# Patient Record
Sex: FEMALE | Race: WHITE | NOT HISPANIC OR LATINO | Employment: FULL TIME | URBAN - METROPOLITAN AREA
[De-identification: names, ages, dates, MRNs, and addresses within clinical notes are randomized per-mention and may not be internally consistent; named-entity substitution may affect disease eponyms.]

---

## 2017-07-06 ENCOUNTER — ALLSCRIPTS OFFICE VISIT (OUTPATIENT)
Dept: OTHER | Facility: OTHER | Age: 37
End: 2017-07-06

## 2017-07-06 DIAGNOSIS — Z12.31 ENCOUNTER FOR SCREENING MAMMOGRAM FOR MALIGNANT NEOPLASM OF BREAST: ICD-10-CM

## 2018-01-11 NOTE — MISCELLANEOUS
Message   Recorded as Task   Date: 12/12/2016 01:14 PM, Created By: Cali Sánchez   Task Name: Med Renewal Request   Assigned To: Ciera Del Rosario   Regarding Patient: Luh Mills TALI, Status: Active   Comment:    Susanna Palomo - 12 Dec 2016 1:14 PM     TASK CREATED  Caller: Self; Renew Medication; [phone] (Home)  Pt would like refill of BC sent to [facility]  Pt is @ [address]  - 12 Dec 2016 1:19 PM     TASK EDITED  sent to pharm,        Active Problems    1  Allergic rhinitis (477 9) (J30 9)   2  Encounter for gynecological examination without abnormal finding (V72 31) (Z01 419)   3  Fibroids (218 9) (D25 9)   4  History of basal cell carcinoma (V10 83) (Z85 828)   5  History of bilateral breast reduction surgery (V45 89) (Z90 13)   6  History of migraine (V12 49) (Z86 69)   7  Need for prophylactic vaccination with combined diphtheria-tetanus-pertussis (DTaP)   vaccine (V06 1) (Z23)   8  Occasional asthma with acute exacerbation (493 92) (J45 901)   9  Reactive airway disease (493 90) (J45 909)    Current Meds   1  Flintstones Plus Iron Oral Tablet Chewable; 2 tabs daily w/ food; Therapy: [id number] to Recorded   2  Lo Loestrin Fe 1 MG-10 MCG / 10 MCG Oral Tablet; TAKE 1 TABLET DAILY; Therapy: 43TSU4930 to (Prieto Yip)  Requested for: 93Cut7824; Last   Rx:[id number] Ordered    Allergies    1  Amoxicillin CAPS   2  Penicillins   3  Sulfa Drugs    4  Other   5   Seasonal    Plan  Encounter for gynecological examination without abnormal finding    · Lo Loestrin Fe 1 MG-10 MCG / 10 MCG Oral Tablet; TAKE 1 TABLET DAILY    Signatures   Electronically signed by : Tod Amaya, ; Dec 12 2016  1:19PM EST                       (Author)

## 2018-01-12 NOTE — CONSULTS
I had the pleasure of evaluating your patient, Evelin Dickey  My full evaluation follows:      History of Present Illness  Elba Elena and is a 41-year-old female who presents today for evaluation of basal cell carcinoma of the left forehead  She was referred to us by Dr Ramiro Pearson' office  This has been present for about a year and a half and is slowly grown in size  It itches on occasion but does not bleed or cause her any pain  Discussion/Summary    Please see HPI  We discussed this with frozen section and talked about options for reconstruction which include complex closure vs local flap rearrangement  We discussed the procedure, how it would be performed as well as the potential risks, benefits, and complications  All questions were answered and consent was obtained  Photographs were taken as well, we will have our surgery scheduler contact her in the near future  The patient was counseled regarding diagnostic results, prognosis  total time of encounter was 30 minutes and 25 minutes was spent counseling  Thank you very much for allowing me to participate in the care of this patient  If you have any questions, please do not hesitate to contact me  Signatures   Electronically signed by : Haley Sam, [facility];  Aug 11 2016  4:09PM EST                       (Author)    Electronically signed by : RADHA Bellamy ; Aug 22 2016 11:01AM EST

## 2018-01-13 VITALS
DIASTOLIC BLOOD PRESSURE: 82 MMHG | HEIGHT: 63 IN | BODY MASS INDEX: 35.44 KG/M2 | SYSTOLIC BLOOD PRESSURE: 120 MMHG | WEIGHT: 200 LBS

## 2018-01-15 NOTE — CONSULTS
Assessment    1  Encounter for gynecological examination without abnormal finding (V72 31) (Q54 510)    Discussion/Summary  Discussion Summary:   Please see HPI  We discussed this with frozen section and talked about options for reconstruction which include complex closure vs local flap rearrangement  We discussed the procedure, how it would be performed as well as the potential risks, benefits, and complications  All questions were answered and consent was obtained  Photographs were taken as well, we will have our surgery scheduler contact her in the near future  Counseling Documentation With Imm: The patient was counseled regarding diagnostic results, prognosis  total time of encounter was 30 minutes and 25 minutes was spent counseling  History of Present Illness  HPI: José Luis Velez and is a 25-year-old female who presents today for evaluation of basal cell carcinoma of the left forehead  She was referred to us by Dr Lyndsey Caldwell' office  This has been present for about a year and a half and is slowly grown in size  It itches on occasion but does not bleed or cause her any pain  Review of Systems  Complete-Female:   Constitutional: no fever and no chills  Eyes: no eyesight problems  ENT: no earache and no hearing loss  Cardiovascular: no chest pain  Respiratory: no shortness of breath  Gastrointestinal: no abdominal pain, no nausea and no diarrhea  Musculoskeletal: no joint swelling and no joint stiffness  Integumentary: skin lesion, but no rashes, no itching and no skin wound  Neurological: no headache and no numbness  Psychiatric: no anxiety and no depression  Hematologic/Lymphatic: no tendency for easy bleeding and no tendency for easy bruising  ROS Reviewed:   ROS reviewed  Active Problems    1  Allergic rhinitis (477 9) (J30 9)   2  Encounter for gynecological examination without abnormal finding (V72 31) (Z71 691)   3  Fibroids (218 9) (D25 9)   4   History of bilateral breast reduction surgery (V45 89) (Z98 89)   5  History of migraine (V12 49) (Z86 69)   6  Need for prophylactic vaccination with combined diphtheria-tetanus-pertussis (DTaP)   vaccine (V06 1) (Z23)   7  Occasional asthma with acute exacerbation (493 92) (J45 901)   8  Reactive airway disease (493 90) (J45 909)    Past Medical History    1  History of Abnormal glucose in pregnancy, antepartum (648 83) (O99 810)   2  History of Elderly primigravida, antepartum (659 53) (O09 519)   3  History of Encounter for postpartum care of lactating mother (V24 1) (Z39 1)   4  History of Exposure to cat feces (V87 39) (T75 89XA)   5  History of pregnancy (V13 29)   6  History of Obesity complicating pregnancy, childbirth, or puerperium, antepartum   (649 13,278 00) (O99 210,E66 9)  Active Problems And Past Medical History Reviewed: The active problems and past medical history were reviewed and updated today  Surgical History    1  History of Arthroscopy Knee   2  History of Breast Surgery Reduction Procedure  Surgical History Reviewed: The surgical history was reviewed and updated today  Family History  Father    1  Family history of myocardial infarction (V17 3) (Z82 49)   2  Family history of Hypertension  Sister    3  Family history of celiac disease (V18 59) (Z83 79)   4  Family history of idiopathic thrombocytopenic purpura (V18 3) (Z83 2)   5  Family history of Lupus   6  Family history of Lupus Vulgaris  Maternal Grandmother    7  Family history of lung cancer (V16 1) (Z80 1)  Paternal Grandmother    6  Family history of Breast cancer  Maternal Grandfather    9  Family history of renal failure (V18 69) (Z84 1)  Family History Reviewed: The family history was reviewed and updated today  Social History    · Always uses seat belt   · Exercise Habits   ·    · Never A Smoker  Social History Reviewed: The social history was reviewed and updated today  The social history was reviewed and is unchanged  Current Meds   1  Flintstones Plus Iron Oral Tablet Chewable; 2 tabs daily w/ food; Therapy: 26LRB7924 to Recorded   2  Lo Loestrin Fe 1 MG-10 MCG / 10 MCG Oral Tablet; TAKE 1 TABLET DAILY; Therapy: 48EEN4962 to (Evaluate:43Sgi4404); Last Rx:98Wej5574 Ordered  Medication List Reviewed: The medication list was reviewed and updated today  Allergies    1  Amoxicillin CAPS   2  Penicillins   3  Sulfa Drugs    4  Other   5  Seasonal    Physical Exam  General Complete Exam (Brief) [facility]:   Constitutional   General appearance: No acute distress, well appearing and well nourished  Eyes   Conjunctiva and lids: No swelling, erythema or discharge  Ears, Nose, Mouth, and Throat   External inspection of ears and nose: Normal     Pulmonary   Respiratory effort: No increased work of breathing or signs of respiratory distress  Cardiovascular   Auscultation of heart: Normal rate and rhythm, normal S1 and S2, without murmurs  Future Appointments    Date/Time Provider Specialty Site   07/06/2017 11:00 AM Geovany Moore, [facility]     Signatures   Electronically signed by : Donna Walker, [facility];  Aug 11 2016  4:09PM EST                       (Author)    Electronically signed by : RADHA Lovelace ; Aug 22 2016 11:01AM EST

## 2018-01-16 NOTE — MISCELLANEOUS
Message   Recorded as Task   Date: 08/29/2016 09:25 AM, Created By: Marylu Melchor   Task Name: Med Renewal Request   Assigned To: Ciera Del Rosario   Regarding Patient: Sierra CESAR, Status: Active   CommentHellen Lank - 29 Aug 2016 9:25 AM     TASK CREATED  Caller: Self; [phone] (Home); [phone] (Work)  pt wanted refills sent to mail order of bc pills        Active Problems    1  Allergic rhinitis (477 9) (J30 9)   2  Encounter for gynecological examination without abnormal finding (V72 31) (Z01 419)   3  Fibroids (218 9) (D25 9)   4  History of bilateral breast reduction surgery (V45 89) (Z98 89)   5  History of migraine (V12 49) (Z86 69)   6  Need for prophylactic vaccination with combined diphtheria-tetanus-pertussis (DTaP)   vaccine (V06 1) (Z23)   7  Occasional asthma with acute exacerbation (493 92) (J45 901)   8  Reactive airway disease (493 90) (J45 909)    Current Meds   1  Flintstones Plus Iron Oral Tablet Chewable; 2 tabs daily w/ food; Therapy: 99RHB8675 to Recorded   2  Lo Loestrin Fe 1 MG-10 MCG / 10 MCG Oral Tablet; TAKE 1 TABLET DAILY; Therapy: 31XCY1384 to (Evaluate:12Btx9194); Last Rx:[id number] Ordered    Allergies    1  Amoxicillin CAPS   2  Penicillins   3  Sulfa Drugs    4  Other   5   Seasonal    Plan  Encounter for gynecological examination without abnormal finding    · Lo Loestrin Fe 1 MG-10 MCG / 10 MCG Oral Tablet; TAKE 1 TABLET DAILY    Signatures   Electronically signed by : Troy Molina, ; Aug 29 2016  9:25AM EST                       (Author)

## 2018-01-16 NOTE — MISCELLANEOUS
Message   Recorded as Task   Date: 02/17/2016 11:57 AM, Created By: Suzie Horner   Task Name: Follow Up   Assigned To: Sweta Varela   Regarding Patient: Gildardo CESAR, Status: Active   Comment:    Suzie Horner - 17 Feb 2016 11:57 AM     TASK CREATED  [facility] viorelle  I will refill it for 3 mos and lm for pt to make apt for mariana   Suzie Horner - 17 Feb 2016 12:00 PM     TASK EDITED        Active Problems    1  Allergic rhinitis (477 9) (J30 9)   2  Encounter for postpartum care of lactating mother (V24 1) (Z39 1)   3  Fibroids (218 9) (D25 9)   4  History of bilateral breast reduction surgery (V45 89) (Z98 89)   5  History of migraine (V12 49) (Z86 69)   6  Need for prophylactic vaccination with combined diphtheria-tetanus-pertussis (DTaP)   vaccine (V06 1) (Z23)   7  Occasional asthma with acute exacerbation (493 92) (J45 901)   8  Reactive airway disease (493 90) (J45 909)    Current Meds   1  Flintstones Plus Iron Oral Tablet Chewable; 2 tabs daily w/ food; Therapy: 63CUK7209 to Recorded   2  Viorele 0 15-0 02/0 01 MG (21/5) Oral Tablet; Take 1 tablet daily as directed; Therapy: 27LFZ1503 to (Amanda Arita)  Requested for: 05HET2300; Last   Rx:22Azx6497 Ordered    Allergies    1  Amoxicillin CAPS   2  Penicillins   3  Sulfa Drugs    4  Other   5  Seasonal    Plan  PMH: Contraceptive use    · From  Viorele 0 15-0 02/0 01 MG (21/5) Oral Tablet Take 1 tablet daily as  directed To Viorele 0 15-0 02/0 01 MG (21/5) Oral Tablet TAKE 1 TABLET EVERY DAY    Signatures   Electronically signed by :  Ann Frias, ; Feb 17 2016 12:01PM EST                       (Author)

## 2018-02-28 ENCOUNTER — OFFICE VISIT (OUTPATIENT)
Dept: FAMILY MEDICINE CLINIC | Facility: CLINIC | Age: 38
End: 2018-02-28
Payer: COMMERCIAL

## 2018-02-28 VITALS
BODY MASS INDEX: 37.21 KG/M2 | DIASTOLIC BLOOD PRESSURE: 92 MMHG | WEIGHT: 202.2 LBS | SYSTOLIC BLOOD PRESSURE: 132 MMHG | HEART RATE: 76 BPM | HEIGHT: 62 IN | RESPIRATION RATE: 16 BRPM

## 2018-02-28 DIAGNOSIS — R20.0 NUMBNESS AND TINGLING IN BOTH HANDS: ICD-10-CM

## 2018-02-28 DIAGNOSIS — R53.82 CHRONIC FATIGUE: Primary | ICD-10-CM

## 2018-02-28 DIAGNOSIS — G89.29 CHRONIC BILATERAL LOW BACK PAIN WITHOUT SCIATICA: ICD-10-CM

## 2018-02-28 DIAGNOSIS — R20.2 NUMBNESS AND TINGLING IN BOTH HANDS: ICD-10-CM

## 2018-02-28 DIAGNOSIS — Z13.220 LIPID SCREENING: ICD-10-CM

## 2018-02-28 DIAGNOSIS — M54.50 CHRONIC BILATERAL LOW BACK PAIN WITHOUT SCIATICA: ICD-10-CM

## 2018-02-28 PROCEDURE — 99204 OFFICE O/P NEW MOD 45 MIN: CPT | Performed by: FAMILY MEDICINE

## 2018-02-28 RX ORDER — CYCLOBENZAPRINE HCL 10 MG
10 TABLET ORAL 3 TIMES DAILY PRN
Qty: 30 TABLET | Refills: 0 | Status: SHIPPED | OUTPATIENT
Start: 2018-02-28 | End: 2018-10-10 | Stop reason: ALTCHOICE

## 2018-02-28 NOTE — PROGRESS NOTES
Quinton Catie was seen today for fatigue  Diagnoses and all orders for this visit:    Chronic fatigue  -     CBC and differential  -     Comprehensive metabolic panel  -     TSH+Free T4  -     Sedimentation rate, automated  -     RF Screen w/ Reflex to Titer  -     CBC and differential  -     Vitamin D 25 hydroxy    Chronic bilateral low back pain without sciatica  -     Urinalysis with microscopic    Numbness and tingling in both hands  -     CBC and differential  -     EMG 2 Limb Upper Extremity    Lipid screening  -     Lipid Panel with Direct LDL reflex          Subjective:      Patient ID: Rodriguez Aguirre is a 42 y o  female  C/o bilateral hand numbness for the last 2 years  The same in intensity  Nothing makes it better or worse  No injury that she could recall  Moving makes it better  Fatigue   This is a chronic problem  The current episode started more than 1 year ago  The problem occurs intermittently  Associated symptoms include abdominal pain, arthralgias, fatigue and myalgias  Pertinent negatives include no anorexia, change in bowel habit, chest pain, chills, congestion, coughing, diaphoresis, fever, headaches, joint swelling, nausea, neck pain, numbness, rash, sore throat, swollen glands, urinary symptoms, vertigo, visual change, vomiting or weakness  Back Pain   This is a new problem  The current episode started 1 to 4 weeks ago  The problem occurs intermittently  The problem has been waxing and waning since onset  Associated symptoms include abdominal pain  Pertinent negatives include no chest pain, fever, headaches, numbness or weakness  Risk factors include history of cancer and obesity  She has tried NSAIDs, ice, heat, home exercises and analgesics for the symptoms  The treatment provided no relief         The following portions of the patient's history were reviewed and updated as appropriate: allergies, current medications, past family history, past medical history, past social history, past surgical history and problem list     Review of Systems   Constitutional: Positive for fatigue  Negative for chills, diaphoresis and fever  HENT: Negative for congestion and sore throat  Respiratory: Negative for cough  Cardiovascular: Negative for chest pain  Gastrointestinal: Positive for abdominal pain  Negative for anorexia, change in bowel habit, nausea and vomiting  Musculoskeletal: Positive for arthralgias, back pain and myalgias  Negative for joint swelling and neck pain  Skin: Negative for rash  Neurological: Negative for vertigo, weakness, numbness and headaches  Past Medical History:   Diagnosis Date    Known health problems: none     Pregnancy     resolved: 11/5/2015     Past Surgical History:   Procedure Laterality Date    ARTHROSCOPY KNEE      BREAST SURGERY      reduction procedure     Social History     Social History    Marital status: Single     Spouse name: N/A    Number of children: N/A    Years of education: N/A     Occupational History    Not on file       Social History Main Topics    Smoking status: Never Smoker    Smokeless tobacco: Never Used    Alcohol use No    Drug use: No    Sexual activity: Yes     Partners: Male     Birth control/ protection: Other     Other Topics Concern    Not on file     Social History Narrative    Always uses seat belt    Exercise habits      Allergies   Allergen Reactions    Amoxicillin Hives    Other      Annotation - [id number]: paprika  blackberries    Penicillins Hives    Sulfa Antibiotics Hives         Family History   Problem Relation Age of Onset    Heart attack Father     Hypertension Father     Celiac disease Sister     Other Sister      idopathic thrombocytopenic purpura    Lupus Sister     Lung cancer Maternal Grandmother     Other Maternal Grandfather      renal failure    Breast cancer Paternal Grandmother          Current Outpatient Prescriptions:     Norethin-Eth Estrad-Fe Biphas (LO LOESTRIN FE) 1 MG-10 MCG / 10 MCG TABS, Take 1 tablet by mouth daily, Disp: , Rfl:       Objective:      /92   Pulse 76   Resp 16   Ht 5' 2" (1 575 m)   Wt 91 7 kg (202 lb 3 2 oz)   BMI 36 98 kg/m²        Physical Exam   Constitutional: She is oriented to person, place, and time  Vital signs are normal  She appears well-developed and well-nourished  HENT:   Head: Normocephalic and atraumatic  Nose: Nose normal    Mouth/Throat: Oropharynx is clear and moist    Eyes: Pupils are equal, round, and reactive to light  Neck: Normal range of motion  Neck supple  No thyromegaly present  Cardiovascular: Normal rate and regular rhythm  No murmur heard  Pulmonary/Chest: Effort normal and breath sounds normal    Abdominal: Soft  Bowel sounds are normal    Musculoskeletal: Normal range of motion  She exhibits tenderness  She exhibits no edema or deformity  Paraspinal lower lumbar spine    Neurological: She is alert and oriented to person, place, and time  She has normal reflexes  Skin: Skin is warm  No rash noted  No erythema  Psychiatric: She has a normal mood and affect   Her behavior is normal

## 2018-02-28 NOTE — PATIENT INSTRUCTIONS

## 2018-03-19 LAB
ALBUMIN SERPL-MCNC: 4.1 G/DL (ref 3.6–5.1)
ALBUMIN/GLOB SERPL: 1.6 (CALC) (ref 1–2.5)
ALP SERPL-CCNC: 53 U/L (ref 33–115)
ALT SERPL-CCNC: 17 U/L (ref 6–29)
AST SERPL-CCNC: 14 U/L (ref 10–30)
BASOPHILS # BLD AUTO: 30 CELLS/UL (ref 0–200)
BASOPHILS NFR BLD AUTO: 0.4 %
BILIRUB SERPL-MCNC: 0.6 MG/DL (ref 0.2–1.2)
BUN SERPL-MCNC: 16 MG/DL (ref 7–25)
BUN/CREAT SERPL: NORMAL (CALC) (ref 6–22)
CALCIUM SERPL-MCNC: 9.1 MG/DL (ref 8.6–10.2)
CHLORIDE SERPL-SCNC: 107 MMOL/L (ref 98–110)
CHOLEST SERPL-MCNC: 204 MG/DL
CHOLEST/HDLC SERPL: 4.4 (CALC)
CO2 SERPL-SCNC: 24 MMOL/L (ref 20–31)
CREAT SERPL-MCNC: 0.79 MG/DL (ref 0.5–1.1)
EOSINOPHIL # BLD AUTO: 81 CELLS/UL (ref 15–500)
EOSINOPHIL NFR BLD AUTO: 1.1 %
ERYTHROCYTE [DISTWIDTH] IN BLOOD BY AUTOMATED COUNT: 12.8 % (ref 11–15)
ERYTHROCYTE [SEDIMENTATION RATE] IN BLOOD BY WESTERGREN METHOD: 2 MM/H
GLOBULIN SER CALC-MCNC: 2.6 G/DL (CALC) (ref 1.9–3.7)
GLUCOSE SERPL-MCNC: 84 MG/DL (ref 65–99)
HCT VFR BLD AUTO: 39.3 % (ref 35–45)
HDLC SERPL-MCNC: 46 MG/DL
HGB BLD-MCNC: 13.3 G/DL (ref 11.7–15.5)
LDLC SERPL CALC-MCNC: 132 MG/DL (CALC)
LYMPHOCYTES # BLD AUTO: 3152 CELLS/UL (ref 850–3900)
LYMPHOCYTES NFR BLD AUTO: 42.6 %
MCH RBC QN AUTO: 29.3 PG (ref 27–33)
MCHC RBC AUTO-ENTMCNC: 33.8 G/DL (ref 32–36)
MCV RBC AUTO: 86.6 FL (ref 80–100)
MONOCYTES # BLD AUTO: 570 CELLS/UL (ref 200–950)
MONOCYTES NFR BLD AUTO: 7.7 %
NEUTROPHILS # BLD AUTO: 3567 CELLS/UL (ref 1500–7800)
NEUTROPHILS NFR BLD AUTO: 48.2 %
NONHDLC SERPL-MCNC: 158 MG/DL (CALC)
PLATELET # BLD AUTO: 368 THOUSAND/UL (ref 140–400)
PMV BLD REES-ECKER: 9.7 FL (ref 7.5–12.5)
POTASSIUM SERPL-SCNC: 4.3 MMOL/L (ref 3.5–5.3)
PROT SERPL-MCNC: 6.7 G/DL (ref 6.1–8.1)
RBC # BLD AUTO: 4.54 MILLION/UL (ref 3.8–5.1)
RHEUMATOID FACT SERPL-ACNC: <14 IU/ML
SL AMB EGFR AFRICAN AMERICAN: 109 ML/MIN/1.73M2
SL AMB EGFR NON AFRICAN AMERICAN: 94 ML/MIN/1.73M2
SODIUM SERPL-SCNC: 140 MMOL/L (ref 135–146)
T4 SERPL-MCNC: 8.3 MCG/DL (ref 4.5–12)
TRIGL SERPL-MCNC: 150 MG/DL
TSH SERPL-ACNC: 1.24 MIU/L
WBC # BLD AUTO: 7.4 THOUSAND/UL (ref 3.8–10.8)

## 2018-04-02 ENCOUNTER — OFFICE VISIT (OUTPATIENT)
Dept: FAMILY MEDICINE CLINIC | Facility: CLINIC | Age: 38
End: 2018-04-02
Payer: COMMERCIAL

## 2018-04-02 VITALS
DIASTOLIC BLOOD PRESSURE: 84 MMHG | BODY MASS INDEX: 34.97 KG/M2 | HEART RATE: 72 BPM | HEIGHT: 64 IN | SYSTOLIC BLOOD PRESSURE: 122 MMHG | WEIGHT: 204.8 LBS | RESPIRATION RATE: 16 BRPM

## 2018-04-02 DIAGNOSIS — Z00.00 WELL ADULT EXAM: ICD-10-CM

## 2018-04-02 DIAGNOSIS — Z12.31 VISIT FOR SCREENING MAMMOGRAM: ICD-10-CM

## 2018-04-02 DIAGNOSIS — J45.21: Primary | ICD-10-CM

## 2018-04-02 PROCEDURE — 99395 PREV VISIT EST AGE 18-39: CPT | Performed by: FAMILY MEDICINE

## 2018-04-02 RX ORDER — FLUTICASONE PROPIONATE 110 UG/1
1 AEROSOL, METERED RESPIRATORY (INHALATION) 2 TIMES DAILY
Qty: 1 INHALER | Refills: 0 | Status: SHIPPED | OUTPATIENT
Start: 2018-04-02 | End: 2018-04-29 | Stop reason: SDUPTHER

## 2018-04-02 RX ORDER — ALBUTEROL SULFATE 90 UG/1
2 AEROSOL, METERED RESPIRATORY (INHALATION) EVERY 6 HOURS PRN
Qty: 1 INHALER | Refills: 0 | Status: SHIPPED | OUTPATIENT
Start: 2018-04-02 | End: 2018-10-10 | Stop reason: ALTCHOICE

## 2018-04-02 NOTE — PROGRESS NOTES
Selene Hitchcock was seen today for physical exam     Diagnoses and all orders for this visit:    Mild intermittent occasional asthma with acute exacerbation  -     albuterol (VENTOLIN HFA) 90 mcg/act inhaler; Inhale 2 puffs every 6 (six) hours as needed for wheezing  -     fluticasone (FLOVENT HFA) 110 MCG/ACT inhaler; Inhale 1 puff 2 (two) times a day    Well adult exam    Visit for screening mammogram  -     Mammo screening bilateral w cad; Future      Patient Counseling:  --Nutrition: Stressed importance of moderation in sodium/caffeine intake, saturated fat and cholesterol, caloric balance, sufficient intake of fresh fruits, vegetables, fiber, calcium, iron, and 1 mg of folate supplement per day   --Discussed  calcium supplement, and the daily use of baby aspirin  --Exercise: Stressed the importance of regular exercise  --Substance Abuse: Discussed cessation/primary prevention of tobacco, alcohol, or other drug use; driving or other dangerous activities under the influence; availability of treatment for abuse  --Sexuality: Discussed sexually transmitted diseases, partner selection, use of condoms, avoidance of unintended pregnancy  and contraceptive alternatives  --Injury prevention: Discussed safety belts, safety helmets, smoke detector, smoking near bedding or upholstery  --Dental health: Discussed importance of regular tooth brushing, flossing, and dental visits  --Immunizations reviewed  --Discussed benefits of screening colonoscopy    Chief Complaint   Patient presents with    Physical Exam     Pt here for Physical       HPI    Patient here for a comprehensive physical exam The patient reports no problems    Do you take any herbs or supplements that were not prescribed by a doctor? no   Are you taking calcium supplements? no   Are you taking aspirin daily? no  How often do you exercise? Cross fit everyday  Diet? High protein   More vegetables and fruits  Dental visit: last year       Vision test: wears glasses and contact lenses  Colonoscopy:  Never had one yet      History:  LMP: 2 weeks ago  Regular  Heavy at times   Last pap date: 2 years ago   Abnormal pap? no  : 1  Para: 1      History   Sexual Activity    Sexual activity: Yes    Partners: Male    Birth control/ protection: Other             Review of Systems   Constitutional: Negative  HENT: Negative  Eyes: Negative  Respiratory: Negative  Cardiovascular: Negative  Gastrointestinal: Negative  Endocrine: Negative  Genitourinary: Negative  Musculoskeletal: Negative  Skin: Negative  Allergic/Immunologic: Negative  Neurological: Negative  Hematological: Negative  Psychiatric/Behavioral: Negative  Family History   Problem Relation Age of Onset    Heart attack Father     Hypertension Father     Celiac disease Sister     Other Sister      idopathic thrombocytopenic purpura    Lupus Sister     Lung cancer Maternal Grandmother     Other Maternal Grandfather      renal failure    Breast cancer Paternal Grandmother        Past Medical History:   Diagnosis Date    Known health problems: none     Pregnancy     resolved: 2015         Social History     Social History    Marital status: Single     Spouse name: N/A    Number of children: N/A    Years of education: N/A     Occupational History    Not on file       Social History Main Topics    Smoking status: Never Smoker    Smokeless tobacco: Never Used    Alcohol use Yes      Comment: social    Drug use: No    Sexual activity: Yes     Partners: Male     Birth control/ protection: Other     Other Topics Concern    Not on file     Social History Narrative    Always uses seat belt    Exercise habits        Current Outpatient Prescriptions on File Prior to Visit   Medication Sig Dispense Refill    cyclobenzaprine (FLEXERIL) 10 mg tablet Take 1 tablet (10 mg total) by mouth 3 (three) times a day as needed for muscle spasms 30 tablet 0  [DISCONTINUED] Norethin-Eth Estrad-Fe Biphas (LO LOESTRIN FE) 1 MG-10 MCG / 10 MCG TABS Take 1 tablet by mouth daily       No current facility-administered medications on file prior to visit  Allergies   Allergen Reactions    Amoxicillin Hives    Other      Annotation - [id number]: paprika  blackberries    Penicillins Hives    Sulfa Antibiotics Hives         Vitals:    04/02/18 1555   BP: 122/84   Pulse: 72   Resp: 16   Weight: 92 9 kg (204 lb 12 8 oz)   Height: 5' 3 7" (1 618 m)         Physical Exam   Constitutional: She is oriented to person, place, and time  Vital signs are normal  She appears well-developed and well-nourished  HENT:   Head: Normocephalic and atraumatic  Nose: Nose normal    Mouth/Throat: Oropharynx is clear and moist    Eyes: Pupils are equal, round, and reactive to light  Neck: Normal range of motion  Neck supple  No thyromegaly present  Cardiovascular: Normal rate and regular rhythm  No murmur heard  Pulmonary/Chest: Effort normal and breath sounds normal    Abdominal: Soft  Bowel sounds are normal    Musculoskeletal: Normal range of motion  She exhibits no edema or deformity  Neurological: She is alert and oriented to person, place, and time  She has normal reflexes  Skin: Skin is warm  No rash noted  No erythema  Psychiatric: She has a normal mood and affect   Her behavior is normal

## 2018-04-29 DIAGNOSIS — J45.21: ICD-10-CM

## 2018-04-30 RX ORDER — DEXAMETHASONE 4 MG/1
1 TABLET ORAL 2 TIMES DAILY
Qty: 12 INHALER | Refills: 0 | Status: SHIPPED | OUTPATIENT
Start: 2018-04-30 | End: 2018-10-10 | Stop reason: ALTCHOICE

## 2018-10-10 ENCOUNTER — OFFICE VISIT (OUTPATIENT)
Dept: OBGYN CLINIC | Facility: CLINIC | Age: 38
End: 2018-10-10
Payer: COMMERCIAL

## 2018-10-10 VITALS — DIASTOLIC BLOOD PRESSURE: 82 MMHG | SYSTOLIC BLOOD PRESSURE: 118 MMHG | BODY MASS INDEX: 32.44 KG/M2 | WEIGHT: 187.2 LBS

## 2018-10-10 DIAGNOSIS — Z01.419 ENCOUNTER FOR GYNECOLOGICAL EXAMINATION WITHOUT ABNORMAL FINDING: Primary | ICD-10-CM

## 2018-10-10 PROCEDURE — S0612 ANNUAL GYNECOLOGICAL EXAMINA: HCPCS | Performed by: PHYSICIAN ASSISTANT

## 2018-10-10 NOTE — PROGRESS NOTES
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 [employer]  She notes a vulvar bump for the past few days that is not painful  Last Pap 3/16/15 neg/neg    No current outpatient prescriptions on file  Social History     Social History    Marital status: Single     Spouse name: N/A    Number of children: N/A    Years of education: N/A     Occupational History    Not on file  Social History Main Topics    Smoking status: Never Smoker    Smokeless tobacco: Never Used    Alcohol use Yes      Comment: social    Drug use: No    Sexual activity: Yes     Partners: Male     Birth control/ protection: Other     Other Topics Concern    Not on file     Social History Narrative    Always uses seat belt    Exercise habits      Family History   Problem Relation Age of Onset    Heart attack Father     Hypertension Father     Celiac disease Sister     Other Sister         idopathic thrombocytopenic purpura    Lupus Sister     Lung cancer Maternal Grandmother     Other Maternal Grandfather         renal failure    Breast cancer Paternal Grandmother       Past Medical History:   Diagnosis Date    Known health problems: none     Pregnancy     resolved: 11/5/2015        O:  Blood pressure 118/82, weight 84 9 kg (187 lb 3 2 oz), last menstrual period 09/19/2018  Patient appears well and is not in distress  Neck is supple without masses  Breasts are symmetrical without mass, tenderness, nipple discharge, skin changes or adenopathy  Abdomen is soft and nontender without masses  External genitals are normal without lesions or rashes  No abnormalities are seen or palpated  Vagina is normal without discharge or bleeding  Cervix is normal without discharge or lesion  Uterus is 12 week sized (known fibroids), mobile, nontender without palpable mass  Adnexa are normal, nontender, without palpable mass  A:  Yearly exam      P:   Pap due 2020   Lo Loestrin sent to mail order   RTO one year for yearly exam or sooner as needed

## 2018-11-12 ENCOUNTER — OFFICE VISIT (OUTPATIENT)
Dept: FAMILY MEDICINE CLINIC | Facility: CLINIC | Age: 38
End: 2018-11-12
Payer: COMMERCIAL

## 2018-11-12 VITALS
SYSTOLIC BLOOD PRESSURE: 106 MMHG | BODY MASS INDEX: 31.76 KG/M2 | RESPIRATION RATE: 16 BRPM | WEIGHT: 186 LBS | HEART RATE: 82 BPM | HEIGHT: 64 IN | DIASTOLIC BLOOD PRESSURE: 74 MMHG | TEMPERATURE: 99.9 F

## 2018-11-12 DIAGNOSIS — J02.9 ACUTE PHARYNGITIS, UNSPECIFIED ETIOLOGY: Primary | ICD-10-CM

## 2018-11-12 PROCEDURE — 1036F TOBACCO NON-USER: CPT | Performed by: FAMILY MEDICINE

## 2018-11-12 PROCEDURE — 3008F BODY MASS INDEX DOCD: CPT | Performed by: FAMILY MEDICINE

## 2018-11-12 PROCEDURE — 99213 OFFICE O/P EST LOW 20 MIN: CPT | Performed by: FAMILY MEDICINE

## 2018-11-12 RX ORDER — PREDNISONE 10 MG/1
20 TABLET ORAL DAILY
Qty: 10 TABLET | Refills: 0 | Status: SHIPPED | OUTPATIENT
Start: 2018-11-12 | End: 2018-11-17

## 2018-11-12 RX ORDER — PROMETHAZINE HYDROCHLORIDE AND CODEINE PHOSPHATE 6.25; 1 MG/5ML; MG/5ML
5 SYRUP ORAL EVERY 4 HOURS PRN
Qty: 120 ML | Refills: 0 | Status: SHIPPED | OUTPATIENT
Start: 2018-11-12 | End: 2019-07-12 | Stop reason: ALTCHOICE

## 2018-11-12 RX ORDER — AZITHROMYCIN 250 MG/1
TABLET, FILM COATED ORAL
COMMUNITY
Start: 2018-11-11 | End: 2019-07-12 | Stop reason: ALTCHOICE

## 2018-11-12 NOTE — PROGRESS NOTES
Assessment/Plan:    No problem-specific Assessment & Plan notes found for this encounter  Diagnoses and all orders for this visit:    Acute pharyngitis, unspecified etiology  -     promethazine-codeine (PHENERGAN WITH CODEINE) 6 25-10 mg/5 mL syrup; Take 5 mL by mouth every 4 (four) hours as needed for cough  -     predniSONE 10 mg tablet; Take 2 tablets (20 mg total) by mouth daily for 5 days    Other orders  -     azithromycin (ZITHROMAX) 250 mg tablet;       continue zpack     Subjective:      Patient ID: Catalina Weston is a 42 y o  female  Sore Throat    This is a new problem  The current episode started yesterday  The problem has been waxing and waning  The pain is worse on the left side  There has been no fever  The fever has been present for less than 1 day  The pain is at a severity of 1/10  The pain is mild  Associated symptoms include coughing  Pertinent negatives include no abdominal pain, congestion, diarrhea, drooling, ear discharge, ear pain, headaches, hoarse voice, plugged ear sensation, neck pain, shortness of breath, stridor, swollen glands, trouble swallowing or vomiting  She has had no exposure to strep or mono  She has tried acetaminophen for the symptoms  The treatment provided mild relief  The following portions of the patient's history were reviewed and updated as appropriate: allergies, current medications, past family history, past medical history, past social history, past surgical history and problem list     Review of Systems   HENT: Positive for sore throat  Negative for congestion, drooling, ear discharge, ear pain, hoarse voice and trouble swallowing  Respiratory: Positive for cough  Negative for shortness of breath and stridor  Gastrointestinal: Negative for abdominal pain, diarrhea and vomiting  Musculoskeletal: Negative for neck pain  Neurological: Negative for headaches           Objective:      /74 (BP Location: Left arm, Patient Position: Sitting, Cuff Size: Standard)   Pulse 82   Temp 99 9 °F (37 7 °C)   Resp 16   Ht 5' 3 7" (1 618 m)   Wt 84 4 kg (186 lb)   BMI 32 23 kg/m²          Physical Exam   Constitutional: She appears well-developed and well-nourished  HENT:   Head: Normocephalic and atraumatic  Mouth/Throat: Posterior oropharyngeal erythema present  No oropharyngeal exudate or tonsillar abscesses  Eyes: Pupils are equal, round, and reactive to light   EOM are normal

## 2019-02-11 ENCOUNTER — AMB VIDEO VISIT (OUTPATIENT)
Dept: URGENT CARE | Facility: MEDICAL CENTER | Age: 39
End: 2019-02-11

## 2019-02-11 DIAGNOSIS — J06.9 ACUTE URI: Primary | ICD-10-CM

## 2019-02-11 PROCEDURE — EVISIT: Performed by: FAMILY MEDICINE

## 2019-02-11 RX ORDER — BENZONATATE 100 MG/1
100 CAPSULE ORAL 3 TIMES DAILY PRN
Qty: 20 CAPSULE | Refills: 0 | Status: SHIPPED | OUTPATIENT
Start: 2019-02-11 | End: 2019-07-12 | Stop reason: ALTCHOICE

## 2019-02-11 RX ORDER — FLUTICASONE PROPIONATE 50 MCG
2 SPRAY, SUSPENSION (ML) NASAL DAILY
Qty: 16 G | Refills: 0 | Status: SHIPPED | OUTPATIENT
Start: 2019-02-11 | End: 2019-07-16 | Stop reason: ALTCHOICE

## 2019-02-11 NOTE — PATIENT INSTRUCTIONS
I prescribed Tessalon Perles 100 mg q 8 hours, fluticasone nasal spray for nasal congestion  She may continue with Mucinex multi symptom medication  However I preferred that she take Tylenol or ibuprofen for fever  If her symptoms worsen, she is to consult with her primary care provider

## 2019-02-11 NOTE — PROGRESS NOTES
[facility]        NAME: Minerva Fam is a 34 y o  female  : 1980    MRN: [id number]  DATE: 2019  TIME: 9:00 AM    Assessment and Plan   Acute URI [J06 9]  1  Acute URI  fluticasone (FLONASE) 50 mcg/act nasal spray    benzonatate (TESSALON PERLES) 100 mg capsule         Patient Instructions       Follow up with PCP in 3-5 days  Proceed to  ER if symptoms worsen  Chief Complaint   No chief complaint on file  History of Present Illness       E visit  Patient complaining of cold symptoms which began today  Symptoms consist of fever 100  She took some Mucinex multi symptom medication with minimal improvement  She is also complaining of nasal congestion, postnasal drip  Cough at times productive  Some shortness of breath but denies wheezing  Known history of asthma and used her Ventolin inhaler this morning  Review of Systems   Review of Systems   Constitutional: Positive for fever  HENT: Positive for congestion and postnasal drip  Respiratory: Positive for cough and shortness of breath  Neurological: Negative            Current Medications       Current Outpatient Medications:     azithromycin (ZITHROMAX) 250 mg tablet, , Disp: , Rfl:     benzonatate (TESSALON PERLES) 100 mg capsule, Take 1 capsule (100 mg total) by mouth 3 (three) times a day as needed for cough, Disp: 20 capsule, Rfl: 0    fluticasone (FLONASE) 50 mcg/act nasal spray, 2 sprays into each nostril daily, Disp: 16 g, Rfl: 0    Norethin-Eth Estrad-Fe Biphas 1 MG-10 MCG / 10 MCG TABS, Take 1 tablet by mouth daily, Disp: 90 tablet, Rfl: 3    promethazine-codeine (PHENERGAN WITH CODEINE) 6 25-10 mg/5 mL syrup, Take 5 mL by mouth every 4 (four) hours as needed for cough, Disp: 120 mL, Rfl: 0    Current Allergies     Allergies as of 2019 - Reviewed 2019   Allergen Reaction Noted    Amoxicillin Hives 2012    Other  2015    Penicillins Hives 2015    Sulfa antibiotics Hives 06/29/2012            The following portions of the patient's history were reviewed and updated as appropriate: allergies, current medications, past family history, past medical history, past social history, past surgical history and problem list      Past Medical History:   Diagnosis Date    Known health problems: none     Pregnancy     resolved: 11/5/2015       Past Surgical History:   Procedure Laterality Date    ARTHROSCOPY KNEE      BREAST SURGERY      reduction procedure       Family History   Problem Relation Age of Onset    Heart attack Father     Hypertension Father     Celiac disease Sister     Other Sister         idopathic thrombocytopenic purpura    Lupus Sister     Lung cancer Maternal Grandmother     Other Maternal Grandfather         renal failure    Breast cancer Paternal Grandmother          Medications have been verified  Objective   There were no vitals taken for this visit         Physical Exam     Physical Exam

## 2019-02-11 NOTE — CARE ANYWHERE EVISITS
Visit Summary for DAXGEORGIE BENJAMIN - Gender: Female - Date of Birth: [id number]  Date: [id number] - Duration: 4 minutes  Patient: Charleen BENJAMIN  Provider: Parrish Ledbetter    Patient Contact Information  Address  [address]; [address] [place] [address]   [phone]    Visit Topics  Fever [Added By: Self - 2019-02-11]  Cold [Added By: Self - 2019-02-11]    Conversation Transcripts     [Notification] You are connected with Family Tristin Physician  [Notification] DAXGEORGIE Rabago is located in [place]  [Notification] DAX Rabago has shared health history         Diagnosis    Procedures  Value: [id number] Code: CPT-4 UNLISTED E&M SERVICE    Medications Prescribed    No prescriptions ordered    Electronically signed by: Angel Luis Partida(NPI [id number])

## 2019-02-15 ENCOUNTER — TELEPHONE (OUTPATIENT)
Dept: FAMILY MEDICINE CLINIC | Facility: CLINIC | Age: 39
End: 2019-02-15

## 2019-02-15 NOTE — TELEPHONE ENCOUNTER
Patient called and stated she has been sick since Monday and she thinks it might have been the flu  and her daughter was just dx with the flu today and she wanted to know if it was to late for to get something for her self sent to the pharmacy   Please advise

## 2019-02-17 NOTE — TELEPHONE ENCOUNTER
I called her and left her voicemail stating if she is feeling better she doesn't need a flu medicine   As long as she didn't have high fever its pretty certain she didn't have flu

## 2019-06-17 ENCOUNTER — OFFICE VISIT (OUTPATIENT)
Dept: OBGYN CLINIC | Facility: CLINIC | Age: 39
End: 2019-06-17
Payer: COMMERCIAL

## 2019-06-17 VITALS — WEIGHT: 191.2 LBS | DIASTOLIC BLOOD PRESSURE: 88 MMHG | BODY MASS INDEX: 33.13 KG/M2 | SYSTOLIC BLOOD PRESSURE: 142 MMHG

## 2019-06-17 DIAGNOSIS — Z30.011 ORAL CONTRACEPTION INITIAL PRESCRIPTION: Primary | ICD-10-CM

## 2019-06-17 DIAGNOSIS — N90.7 LABIAL CYST: ICD-10-CM

## 2019-06-17 DIAGNOSIS — Z01.419 ENCOUNTER FOR GYNECOLOGICAL EXAMINATION WITHOUT ABNORMAL FINDING: ICD-10-CM

## 2019-06-17 PROCEDURE — 99214 OFFICE O/P EST MOD 30 MIN: CPT | Performed by: NURSE PRACTITIONER

## 2019-07-11 ENCOUNTER — AMB VIDEO VISIT (OUTPATIENT)
Dept: OTHER | Facility: HOSPITAL | Age: 39
End: 2019-07-11

## 2019-07-11 DIAGNOSIS — J06.9 ACUTE URI: Primary | ICD-10-CM

## 2019-07-11 PROCEDURE — EVISIT: Performed by: FAMILY MEDICINE

## 2019-07-11 NOTE — PROGRESS NOTES
[facility]        NAME: Venkata Mcgovern is a 34 y o  female  : 1980    MRN: [id number]  DATE: 2019  TIME: 4:49 PM    Assessment and Plan   Acute URI [J06 9]  1  Acute URI       Patient Instructions   1  Acute viral URI (common cold)  - rest and drink plenty of fluids  - take Tylenol or Motrin as needed   - run a humidifier at home  - try warm salt water gargles and throat lozenges as needed   - may take Mucinex as needed for cough   - if symptoms persist despite treatment or worsen, follow up w/ pcp for re-check       Video Visit - Venkata Mcgovern 34 y o  female MRN: [id number]    REQUIRED DOCUMENTATION:       There were no vitals filed for this visit  1  [facility] via [facility]  2  Provider located at [address] [facility]  [phone]   3  [facility] provider: Armaan Mejia MD   4  Identify all parties in room with patient during [facility] visit: patient only  5  After connecting through [employer], patient was identified by name and date of birth  Patient was then informed that this was a Telemedicine visit and that the exam was being conducted confidentially over secure lines  My office door was closed  Other methods to assure confidentiality were taken  No one else was in the room  and The following individuals were in the room with me and the patient informed   Patient acknowledged consent and understanding of privacy and security of the Telemedicine visit  I informed the patient that I have reviewed their record in Epic and presented the opportunity for them to ask any questions regarding the visit today  The patient agreed to participate  Patient Instructions     Patient Instructions   1   Acute viral URI (common cold)  - rest and drink plenty of fluids  - take Tylenol or Motrin as needed   - run a humidifier at home  - try warm salt water gargles and throat lozenges as needed   - may take Mucinex as needed for cough   - if symptoms persist despite treatment or worsen, follow up w/ pcp for re-check     Follow up with PCP in 5-7 days  Proceed to  ER if symptoms worsen  Chief Complaint   No chief complaint on file  History of Present Illness       35 yo female, presents via virtual visit c/o nasal congestion, PND, sore throat, and productive cough x 2 days  No fever/chills  No headache or body aches  No chest pain, SOB, or wheezing  Non-smoker  No GI sx  No skin rashes  She has been taking DayQuil as needed  She denies any known sick contacts  PMHx: none   Rx: OCPs  Allergies: PCN, Sulfa  Pregnancy: no   BF: no   Smoker: no       Review of Systems   Review of Systems   Constitutional: Negative  HENT:        As noted in HPI   Eyes: Negative  Respiratory:        As noted in HPI   Cardiovascular: Negative  Gastrointestinal: Negative  Musculoskeletal: Negative  Skin: Negative            Current Medications       Current Outpatient Medications:     azithromycin (ZITHROMAX) 250 mg tablet, , Disp: , Rfl:     benzonatate (TESSALON PERLES) 100 mg capsule, Take 1 capsule (100 mg total) by mouth 3 (three) times a day as needed for cough (Patient not taking: Reported on 6/17/2019), Disp: 20 capsule, Rfl: 0    fluticasone (FLONASE) 50 mcg/act nasal spray, 2 sprays into each nostril daily (Patient not taking: Reported on 6/17/2019), Disp: 16 g, Rfl: 0    Norethin-Eth Estrad-Fe Biphas 1 MG-10 MCG / 10 MCG TABS, Take 1 tablet by mouth daily, Disp: 90 tablet, Rfl: 0    promethazine-codeine (PHENERGAN WITH CODEINE) 6 25-10 mg/5 mL syrup, Take 5 mL by mouth every 4 (four) hours as needed for cough (Patient not taking: Reported on 6/17/2019), Disp: 120 mL, Rfl: 0    Current Allergies     Allergies as of 07/11/2019 - Reviewed 06/18/2019   Allergen Reaction Noted    Amoxicillin Hives 06/29/2012    Other  03/13/2015    Penicillins Hives 03/13/2015    Sulfa antibiotics Hives 06/29/2012 The following portions of the patient's history were reviewed and updated as appropriate: allergies, current medications, past family history, past medical history, past social history, past surgical history and problem list      Past Medical History:   Diagnosis Date    Known health problems: none     Pregnancy     resolved: 11/5/2015       Past Surgical History:   Procedure Laterality Date    ARTHROSCOPY KNEE      BREAST SURGERY      reduction procedure       Family History   Problem Relation Age of Onset    Heart attack Father     Hypertension Father     Celiac disease Sister     Other Sister         idopathic thrombocytopenic purpura    Lupus Sister     Lung cancer Maternal Grandmother     Other Maternal Grandfather         renal failure    Breast cancer Paternal Grandmother          Medications have been verified  Objective   There were no vitals taken for this visit  Physical Exam     Physical Exam   Constitutional: She is oriented to person, place, and time  She appears well-developed and well-nourished  She is active and cooperative  Non-toxic appearance  She does not have a sickly appearance  She does not appear ill  No distress  HENT:   Exam is limited due to virtual visit  Throat does not appear to be erythematous or swollen  No exudates  Airway is patent  Pulmonary/Chest: Effort normal  No respiratory distress  Neurological: She is alert and oriented to person, place, and time  Skin: She is not diaphoretic  Psychiatric: She has a normal mood and affect   Her behavior is normal  Judgment and thought content normal

## 2019-07-11 NOTE — PATIENT INSTRUCTIONS
1  Acute viral URI (common cold)  - rest and drink plenty of fluids  - take Tylenol or Motrin as needed   - run a humidifier at home  - try warm salt water gargles and throat lozenges as needed   - may take Mucinex as needed for cough   - if symptoms persist despite treatment or worsen, follow up w/ pcp for re-check

## 2019-07-11 NOTE — CARE ANYWHERE EVISITS
Visit Summary for DAXGEORGIE BENJAMIN - Gender: Female - Date of Birth: [id number]  Date: [id number] - Duration: 6 minutes  Patient: Brandon BENJAMIN  Provider: Boaz Messing    Patient Contact Information  Address  [address]; [address] [facility]   [phone]    Visit Topics  Cold [Added By: Self - 2019-07-11]    Conversation Transcripts  [0A][0A] [Notification] You are connected with Boaz Messing, Urgent Care Specialist [0A][Notification] DAX Mascorro is located in [address] [place] [address]  [0A][Notification] DAX Mascorro has shared health history  Oleksandr Kidd  [0A]    Diagnosis    Procedures  Value: [id number] Code: CPT-4 UNLISTED E&M SERVICE    Medications Prescribed    No prescriptions ordered    Electronically signed by: Emiliano Tate(NPI [id number])

## 2019-07-12 ENCOUNTER — OFFICE VISIT (OUTPATIENT)
Dept: FAMILY MEDICINE CLINIC | Facility: CLINIC | Age: 39
End: 2019-07-12
Payer: COMMERCIAL

## 2019-07-12 VITALS
WEIGHT: 199 LBS | BODY MASS INDEX: 33.97 KG/M2 | HEART RATE: 83 BPM | RESPIRATION RATE: 14 BRPM | SYSTOLIC BLOOD PRESSURE: 158 MMHG | TEMPERATURE: 99.2 F | DIASTOLIC BLOOD PRESSURE: 100 MMHG | OXYGEN SATURATION: 98 % | HEIGHT: 64 IN

## 2019-07-12 DIAGNOSIS — Z12.31 VISIT FOR SCREENING MAMMOGRAM: ICD-10-CM

## 2019-07-12 DIAGNOSIS — J03.90 ACUTE TONSILLITIS, UNSPECIFIED ETIOLOGY: Primary | ICD-10-CM

## 2019-07-12 PROCEDURE — 99213 OFFICE O/P EST LOW 20 MIN: CPT | Performed by: FAMILY MEDICINE

## 2019-07-12 PROCEDURE — 3008F BODY MASS INDEX DOCD: CPT | Performed by: FAMILY MEDICINE

## 2019-07-12 RX ORDER — AZITHROMYCIN 250 MG/1
TABLET, FILM COATED ORAL
Qty: 6 TABLET | Refills: 0 | Status: SHIPPED | OUTPATIENT
Start: 2019-07-12 | End: 2019-07-17

## 2019-07-12 NOTE — PROGRESS NOTES
Assessment/Plan:    No problem-specific Assessment & Plan notes found for this encounter  Diagnoses and all orders for this visit:    Acute tonsillitis, unspecified etiology  -     azithromycin (ZITHROMAX) 250 mg tablet; 2 tabs x 1 day, 1 tab x 4 days    Visit for screening mammogram  -     Mammo screening bilateral w cad; Future      BMI Counseling: Body mass index is 33 64 kg/m²  Discussed the patient's BMI with her  The BMI is above average  BMI counseling and education was provided to the patient  Nutrition recommendations include 3-5 servings of fruits/vegetables daily, consuming healthier snacks and moderation in carbohydrate intake  Exercise recommendations include moderate aerobic physical activity for 150 minutes/week  Subjective:      Patient ID: Tiffany Hanson is a 34 y o  female  Sore Throat    This is a new problem  The current episode started in the past 7 days  The problem has been waxing and waning  There has been no fever  The pain is at a severity of 1/10  The pain is mild  Associated symptoms include coughing  Pertinent negatives include no abdominal pain, congestion, diarrhea, drooling, ear discharge, ear pain, headaches, hoarse voice, plugged ear sensation, neck pain, shortness of breath, stridor, swollen glands, trouble swallowing or vomiting  She has had no exposure to strep or mono  The treatment provided mild relief  The following portions of the patient's history were reviewed and updated as appropriate: allergies, current medications, past family history, past medical history, past social history, past surgical history and problem list     Review of Systems   HENT: Positive for sore throat  Negative for congestion, drooling, ear discharge, ear pain, hoarse voice and trouble swallowing  Respiratory: Positive for cough  Negative for shortness of breath and stridor  Gastrointestinal: Negative for abdominal pain, diarrhea and vomiting     Musculoskeletal: Negative for neck pain  Neurological: Negative for headaches  Objective:      /100   Pulse 83   Temp 99 2 °F (37 3 °C)   Resp 14   Ht 5' 4 49" (1 638 m)   Wt 90 3 kg (199 lb)   SpO2 98%   BMI 33 64 kg/m²          Physical Exam   Constitutional: She appears well-developed and well-nourished  HENT:   Mouth/Throat: Posterior oropharyngeal erythema present  No oropharyngeal exudate  Eyes: Pupils are equal, round, and reactive to light  EOM are normal    Cardiovascular: Normal rate and regular rhythm  Exam reveals no friction rub  No murmur heard    Pulmonary/Chest: Effort normal and breath sounds normal

## 2019-07-15 ENCOUNTER — TELEPHONE (OUTPATIENT)
Dept: FAMILY MEDICINE CLINIC | Facility: CLINIC | Age: 39
End: 2019-07-15

## 2019-07-15 DIAGNOSIS — J45.21: Primary | ICD-10-CM

## 2019-07-15 RX ORDER — DEXTROMETHORPHAN HYDROBROMIDE AND PROMETHAZINE HYDROCHLORIDE 15; 6.25 MG/5ML; MG/5ML
5 SOLUTION ORAL 4 TIMES DAILY PRN
Qty: 180 ML | Refills: 0 | Status: SHIPPED | OUTPATIENT
Start: 2019-07-15 | End: 2019-09-03 | Stop reason: ALTCHOICE

## 2019-07-15 NOTE — TELEPHONE ENCOUNTER
Patient called and stated she was in on Friday and stated she didn't want anything for the cough but now she wants to know if you can send something to the pharmacy as the cough hasn't gotten any better        Pharmacy is [facility] on [facility]

## 2019-07-16 ENCOUNTER — ANNUAL EXAM (OUTPATIENT)
Dept: OBGYN CLINIC | Facility: CLINIC | Age: 39
End: 2019-07-16
Payer: COMMERCIAL

## 2019-07-16 VITALS
SYSTOLIC BLOOD PRESSURE: 140 MMHG | HEIGHT: 63 IN | DIASTOLIC BLOOD PRESSURE: 90 MMHG | BODY MASS INDEX: 34.73 KG/M2 | WEIGHT: 196 LBS

## 2019-07-16 DIAGNOSIS — Z12.31 ENCOUNTER FOR SCREENING MAMMOGRAM FOR MALIGNANT NEOPLASM OF BREAST: ICD-10-CM

## 2019-07-16 DIAGNOSIS — N92.0 MENORRHAGIA WITH REGULAR CYCLE: Primary | ICD-10-CM

## 2019-07-16 DIAGNOSIS — Z01.419 ENCNTR FOR GYN EXAM (GENERAL) (ROUTINE) W/O ABN FINDINGS: ICD-10-CM

## 2019-07-16 PROBLEM — Z30.011 ORAL CONTRACEPTION INITIAL PRESCRIPTION: Status: RESOLVED | Noted: 2019-06-17 | Resolved: 2019-07-16

## 2019-07-16 PROCEDURE — 99213 OFFICE O/P EST LOW 20 MIN: CPT | Performed by: PHYSICIAN ASSISTANT

## 2019-07-16 RX ORDER — TRANEXAMIC ACID 650 1/1
2 TABLET ORAL 3 TIMES DAILY
Qty: 30 TABLET | Refills: 3 | Status: SHIPPED | OUTPATIENT
Start: 2019-07-16 | End: 2019-10-30 | Stop reason: ALTCHOICE

## 2019-07-16 NOTE — PROGRESS NOTES
Cookie Spine  1980    S:  34 y o  female here for a problem visit  She reports that she stopped her birth control pills (Lo Loestrin) for about 4 months because she was having headaches  Since stopping her pills, her headaches have resolved, but her periods are again heavy and crampy - which is why she went on it in the first place  She says that now that she is back on Lo Loestrin, she is having headaches again  We also reviewed her elevated blood pressure which she thinks is from the cough medicine she is taking - although her BP was elevated a month ago at her last visit, as well  We discussed my preference to avoid estrogen due to her headaches and blood pressure, and she is in agreement  She says her periods are regular, last 7 days with 3-4 heavy days where she will saturate a regular pad in an hour  We discussed rechecking her TSH and CBC, as well as a pelvic ultrasound (she has a hx of fibroids)  We discussed a trial of Lysteda and she is interested in this  If this fails, then she will consider a Mirena  She says at this point they would prefer to not prevent pregnancy but she also is not very interested in pursuing pregnancy, either, due to her age       Past Medical History:   Diagnosis Date    Known health problems: none     Pregnancy     resolved: 11/5/2015     Family History   Problem Relation Age of Onset    Heart attack Father     Hypertension Father     Celiac disease Sister     Other Sister         idopathic thrombocytopenic purpura    Lupus Sister     Lung cancer Maternal Grandmother     Other Maternal Grandfather         renal failure    Breast cancer Paternal Grandmother      Social History     Socioeconomic History    Marital status: Single     Spouse name: None    Number of children: None    Years of education: None    Highest education level: None   Occupational History    None   Social Needs    Financial resource strain: None    Food insecurity:     Worry: None     Inability: None    Transportation needs:     Medical: None     Non-medical: None   Tobacco Use    Smoking status: Never Smoker    Smokeless tobacco: Never Used   Substance and Sexual Activity    Alcohol use: Yes     Comment: social    Drug use: No    Sexual activity: Yes     Partners: Male     Birth control/protection: OCP   Lifestyle    Physical activity:     Days per week: None     Minutes per session: None    Stress: None   Relationships    Social connections:     Talks on phone: None     Gets together: None     Attends Ho[employer] service: None     Active member of club or organization: None     Attends meetings of clubs or organizations: None     Relationship status: None    Intimate partner violence:     Fear of current or ex partner: None     Emotionally abused: None     Physically abused: None     Forced sexual activity: None   Other Topics Concern    None   Social History Narrative    Always uses seat belt    Exercise habits        Review of Systems   Respiratory: Negative  Cardiovascular: Negative  Gastrointestinal: Negative for constipation and diarrhea  Genitourinary: Negative for difficulty urinating, pelvic pain, vaginal bleeding, vaginal discharge, itching or odor  O:  /90 (BP Location: Right arm, Patient Position: Sitting, Cuff Size: Standard)   Ht 5' 2 99" (1 6 m)   Wt 88 9 kg (196 lb)   BMI 34 73 kg/m²   She appears well and is in no distress  Abdomen is soft and nontender  External genitals are normal without lesions or rashes  Vagina is normal, no discharge or bleeding noted  Cervix is normal, no lesions or discharge  Uterus is 12 week sized, nontender  Adnexa are nontender, no pelvic masses appreciated    A/P: Menorrhagia, regular cycle  Discontinue Lo Loestrin  Check TSH, CBC, pelvic US  Trial Eloisa  Call if she desires Mirena

## 2019-07-17 DIAGNOSIS — J45.21: Primary | ICD-10-CM

## 2019-07-17 RX ORDER — PROMETHAZINE HYDROCHLORIDE AND CODEINE PHOSPHATE 6.25; 1 MG/5ML; MG/5ML
5 SYRUP ORAL EVERY 4 HOURS PRN
Qty: 120 ML | Refills: 0 | Status: SHIPPED | OUTPATIENT
Start: 2019-07-17 | End: 2019-09-03 | Stop reason: ALTCHOICE

## 2019-07-17 NOTE — TELEPHONE ENCOUNTER
Patient called, she is still coughing and not getting better on the Promethazine-DM (PHENERGAN-DM) 6 25-15 mg/5 mL oral syrup     Take 5 mL by mouth 4 (four) times a day as needed for cough    She has not been able to sleep well since starting the medication  Can you prescribe another medication?

## 2019-07-18 ENCOUNTER — HOSPITAL ENCOUNTER (OUTPATIENT)
Dept: MAMMOGRAPHY | Facility: HOSPITAL | Age: 39
Discharge: HOME | End: 2019-07-18
Payer: COMMERCIAL

## 2019-07-18 VITALS — WEIGHT: 196 LBS | BODY MASS INDEX: 34.73 KG/M2 | HEIGHT: 63 IN

## 2019-07-18 DIAGNOSIS — Z12.31 VISIT FOR SCREENING MAMMOGRAM: ICD-10-CM

## 2019-07-18 PROCEDURE — 77067 SCR MAMMO BI INCL CAD: CPT

## 2019-08-15 ENCOUNTER — TELEPHONE (OUTPATIENT)
Dept: OBGYN CLINIC | Facility: CLINIC | Age: 39
End: 2019-08-15

## 2019-08-20 NOTE — TELEPHONE ENCOUNTER
Buy and bill Mirena labeled and placed in [facility] room  Pt can be scheduled for insertion during menses or after abstinence period

## 2019-08-28 NOTE — TELEPHONE ENCOUNTER
DAX CALLED AND HAS AN APPOINTMENT FOR 9/13/19 WITH Nikki Patches  SHE STATES SHE SHOULD HAVE HER MENSES THEN  TOLD HER TO TAKE IBUPROFEN, LIGHT SNACK, AND HYDRATE

## 2019-09-03 ENCOUNTER — OFFICE VISIT (OUTPATIENT)
Dept: OBGYN CLINIC | Facility: CLINIC | Age: 39
End: 2019-09-03
Payer: COMMERCIAL

## 2019-09-03 VITALS — DIASTOLIC BLOOD PRESSURE: 82 MMHG | WEIGHT: 195 LBS | SYSTOLIC BLOOD PRESSURE: 120 MMHG | BODY MASS INDEX: 34.54 KG/M2

## 2019-09-03 DIAGNOSIS — B37.3 YEAST VAGINITIS: Primary | ICD-10-CM

## 2019-09-03 PROCEDURE — 99213 OFFICE O/P EST LOW 20 MIN: CPT | Performed by: PHYSICIAN ASSISTANT

## 2019-09-03 RX ORDER — FLUCONAZOLE 150 MG/1
TABLET ORAL
Qty: 2 TABLET | Refills: 0 | Status: SHIPPED | OUTPATIENT
Start: 2019-09-03 | End: 2019-09-06

## 2019-09-03 NOTE — PROGRESS NOTES
Rekha Shaffer  1980    S:  34 y o  female here for a problem visit  Last week she used a new wet wipe after intercourse, then went to the gym, and the next day had significant vulvar itching and burning  She used Monistat-3 with the last dose last night and is somewhat better       Past Medical History:   Diagnosis Date    Known health problems: none     Pregnancy     resolved: 11/5/2015     Family History   Problem Relation Age of Onset    Heart attack Father     Hypertension Father     Pancreatic cancer Father 61    Celiac disease Sister     Other Sister         idopathic thrombocytopenic purpura    Lupus Sister     Lung cancer Maternal Grandmother     Other Maternal Grandfather         renal failure    Breast cancer Paternal Grandmother 54     Social History     Socioeconomic History    Marital status: /Civil Union     Spouse name: None    Number of children: None    Years of education: None    Highest education level: None   Occupational History    None   Social Needs    Financial resource strain: None    Food insecurity:     Worry: None     Inability: None    Transportation needs:     Medical: None     Non-medical: None   Tobacco Use    Smoking status: Never Smoker    Smokeless tobacco: Never Used   Substance and Sexual Activity    Alcohol use: Yes     Comment: social    Drug use: No    Sexual activity: Yes     Partners: Male     Birth control/protection: OCP   Lifestyle    Physical activity:     Days per week: None     Minutes per session: None    Stress: None   Relationships    Social connections:     Talks on phone: None     Gets together: None     Attends Yazidi service: None     Active member of club or organization: None     Attends meetings of clubs or organizations: None     Relationship status: None    Intimate partner violence:     Fear of current or ex partner: None     Emotionally abused: None     Physically abused: None     Forced sexual activity: None   Other Topics Concern    None   Social History Narrative    Always uses seat belt    Exercise habits        Review of Systems   Respiratory: Negative  Cardiovascular: Negative  Gastrointestinal: Negative for constipation and diarrhea  Genitourinary: Negative for difficulty urinating, pelvic pain, vaginal bleeding, vaginal discharge, itching or odor  O:  /82 (BP Location: Right arm, Patient Position: Sitting, Cuff Size: Standard)   Wt 88 5 kg (195 lb)   LMP 08/14/2019   BMI 34 54 kg/m²   She appears well and is in no distress  Abdomen is soft and nontender  External genitals are normal without lesions or rashes  Vagina shows scant white discharge  Cervix is normal, no lesions or discharge    Wet mount reveals few yeast, no clue cells, no trich, pH 3 5, neg whiff     A/P: Yeast vaginitis  Since she has completed a 3 day course of Monistat and seems to be improving, will wait 1-2 days and if not all better will then use Diflucan x 2 doses  Call in 2 weeks if not all better

## 2019-09-05 ENCOUNTER — TELEPHONE (OUTPATIENT)
Dept: OBGYN CLINIC | Facility: CLINIC | Age: 39
End: 2019-09-05

## 2019-09-05 DIAGNOSIS — N39.0 URINARY TRACT INFECTION WITHOUT HEMATURIA, SITE UNSPECIFIED: Primary | ICD-10-CM

## 2019-09-05 NOTE — TELEPHONE ENCOUNTER
Pt seen on 9/3 with rosalva for [place],  Pt now having strong urine smell & urinating a lot , pls call pt to advise, thanks

## 2019-09-06 ENCOUNTER — APPOINTMENT (OUTPATIENT)
Dept: LAB | Facility: CLINIC | Age: 39
End: 2019-09-06
Payer: COMMERCIAL

## 2019-09-06 DIAGNOSIS — N92.0 MENORRHAGIA WITH REGULAR CYCLE: ICD-10-CM

## 2019-09-06 LAB
BASOPHILS # BLD AUTO: 0.03 THOUSANDS/ΜL (ref 0–0.1)
BASOPHILS NFR BLD AUTO: 0 % (ref 0–1)
EOSINOPHIL # BLD AUTO: 0.07 THOUSAND/ΜL (ref 0–0.61)
EOSINOPHIL NFR BLD AUTO: 1 % (ref 0–6)
ERYTHROCYTE [DISTWIDTH] IN BLOOD BY AUTOMATED COUNT: 12.2 % (ref 11.6–15.1)
HCT VFR BLD AUTO: 41.3 % (ref 34.8–46.1)
HGB BLD-MCNC: 13.8 G/DL (ref 11.5–15.4)
IMM GRANULOCYTES # BLD AUTO: 0.02 THOUSAND/UL (ref 0–0.2)
IMM GRANULOCYTES NFR BLD AUTO: 0 % (ref 0–2)
LYMPHOCYTES # BLD AUTO: 3.74 THOUSANDS/ΜL (ref 0.6–4.47)
LYMPHOCYTES NFR BLD AUTO: 45 % (ref 14–44)
MCH RBC QN AUTO: 29.3 PG (ref 26.8–34.3)
MCHC RBC AUTO-ENTMCNC: 33.4 G/DL (ref 31.4–37.4)
MCV RBC AUTO: 88 FL (ref 82–98)
MONOCYTES # BLD AUTO: 0.6 THOUSAND/ΜL (ref 0.17–1.22)
MONOCYTES NFR BLD AUTO: 7 % (ref 4–12)
NEUTROPHILS # BLD AUTO: 3.9 THOUSANDS/ΜL (ref 1.85–7.62)
NEUTS SEG NFR BLD AUTO: 47 % (ref 43–75)
NRBC BLD AUTO-RTO: 0 /100 WBCS
PLATELET # BLD AUTO: 370 THOUSANDS/UL (ref 149–390)
PMV BLD AUTO: 9.2 FL (ref 8.9–12.7)
RBC # BLD AUTO: 4.71 MILLION/UL (ref 3.81–5.12)
TSH SERPL DL<=0.05 MIU/L-ACNC: 1.29 UIU/ML (ref 0.36–3.74)
WBC # BLD AUTO: 8.36 THOUSAND/UL (ref 4.31–10.16)

## 2019-09-06 PROCEDURE — 36415 COLL VENOUS BLD VENIPUNCTURE: CPT

## 2019-09-06 PROCEDURE — 85025 COMPLETE CBC W/AUTO DIFF WBC: CPT

## 2019-09-06 PROCEDURE — 84443 ASSAY THYROID STIM HORMONE: CPT

## 2019-09-06 RX ORDER — NITROFURANTOIN 25; 75 MG/1; MG/1
100 CAPSULE ORAL 2 TIMES DAILY
Qty: 10 CAPSULE | Refills: 0 | Status: SHIPPED | OUTPATIENT
Start: 2019-09-06 | End: 2019-10-30 | Stop reason: ALTCHOICE

## 2019-09-06 NOTE — TELEPHONE ENCOUNTER
Pt c/o discomfort  At end of urine stream with foul odor to urine   she has been  Forcing fluids   Will go for u/a with c&s today  req rx  Allergy to pen and sulfa    Order placed in epic for macrobid

## 2019-09-13 ENCOUNTER — PROCEDURE VISIT (OUTPATIENT)
Dept: OBGYN CLINIC | Facility: CLINIC | Age: 39
End: 2019-09-13
Payer: COMMERCIAL

## 2019-09-13 VITALS — BODY MASS INDEX: 34.54 KG/M2 | WEIGHT: 195 LBS | SYSTOLIC BLOOD PRESSURE: 108 MMHG | DIASTOLIC BLOOD PRESSURE: 72 MMHG

## 2019-09-13 DIAGNOSIS — Z30.430 ENCOUNTER FOR INSERTION OF MIRENA IUD: Primary | ICD-10-CM

## 2019-09-13 PROCEDURE — 58300 INSERT INTRAUTERINE DEVICE: CPT | Performed by: PHYSICIAN ASSISTANT

## 2019-09-13 NOTE — PROGRESS NOTES
Marge Mendoza Elham  1980      CC:  IUD insertion    S:  34 y o  female here for IUD insertion  She requests Mirena IUD for heavy bleeding  We reviewed the risks of IUD insertion including pain, perforation, migration, irregular bleeding, failure, and infection  She is aware that with Mirena she can expect up to 6 months of irregular bleeding and at that point her periods should become lighter, shorter, and less crampy, and that many women stop having periods with their Mirena  She did premedicate with ibuprofen and a snack prior to insertion  Package insert consent form was signed by the patient and myself prior to the procedure       Past Medical History:   Diagnosis Date    Known health problems: none     Pregnancy     resolved: 11/5/2015     Family History   Problem Relation Age of Onset    Heart attack Father     Hypertension Father     Pancreatic cancer Father 61    Celiac disease Sister     Other Sister         idopathic thrombocytopenic purpura    Lupus Sister     Lung cancer Maternal Grandmother     Other Maternal Grandfather         renal failure    Breast cancer Paternal Grandmother 54     Social History     Socioeconomic History    Marital status: /Civil Union     Spouse name: None    Number of children: None    Years of education: None    Highest education level: None   Occupational History    None   Social Needs    Financial resource strain: None    Food insecurity:     Worry: None     Inability: None    Transportation needs:     Medical: None     Non-medical: None   Tobacco Use    Smoking status: Never Smoker    Smokeless tobacco: Never Used   Substance and Sexual Activity    Alcohol use: Yes     Comment: social    Drug use: No    Sexual activity: Yes     Partners: Male     Birth control/protection: OCP   Lifestyle    Physical activity:     Days per week: None     Minutes per session: None    Stress: None   Relationships    Social connections: Talks on phone: None     Gets together: None     Attends Druze service: None     Active member of club or organization: None     Attends meetings of clubs or organizations: None     Relationship status: None    Intimate partner violence:     Fear of current or ex partner: None     Emotionally abused: None     Physically abused: None     Forced sexual activity: None   Other Topics Concern    None   Social History Narrative    Always uses seat belt    Exercise habits        O:   Blood pressure 108/72, weight 88 5 kg (195 lb), last menstrual period 09/12/2019  Patient appears well and is not in distress  Abdomen is soft and nontender  External genitals are normal without lesion or rash  Vagina is normal    Cervix is normal without lesion  PROCEDURE:   The cervix was cleansed with Betadine  The uterus was sounded to 9 cm  IUD was inserted to the fundus without dilation and with tenaculum  Strings were trimmed to 3cm    The patient tolerated the procedure well without complication  A:  IUD insertion  P: The patient will return in one month for IUD check but knows to call sooner should she have problems prior to that visit

## 2019-10-23 ENCOUNTER — TELEPHONE (OUTPATIENT)
Dept: OTHER | Facility: OTHER | Age: 39
End: 2019-10-23

## 2019-10-24 NOTE — TELEPHONE ENCOUNTER
Patient called to cancel appointment for tomorrow  She is aware she needs to call when the office is open to reschedule

## 2019-10-29 NOTE — PROGRESS NOTES
Alem Howe Elham  1980      CC: IUD check    S: 34 y o  female here for IUD check  She is status post placement of a Mirena IUD on 9/13/19  She has had mild irregular bleeding since placement but nothing bothersome to her  She has had no pain or other side effects  No LMP recorded (lmp unknown)  Patient has had an implant      Current Outpatient Medications:     levonorgestrel (MIRENA) 20 MCG/24HR IUD, 1 each by Intrauterine route once, Disp: , Rfl:   Social History     Socioeconomic History    Marital status: /Civil Union     Spouse name: Not on file    Number of children: Not on file    Years of education: Not on file    Highest education level: Not on file   Occupational History    Not on file   Social Needs    Financial resource strain: Not on file    Food insecurity:     Worry: Not on file     Inability: Not on file    Transportation needs:     Medical: Not on file     Non-medical: Not on file   Tobacco Use    Smoking status: Never Smoker    Smokeless tobacco: Never Used   Substance and Sexual Activity    Alcohol use: Yes     Comment: social    Drug use: No    Sexual activity: Yes     Partners: Male     Birth control/protection: OCP   Lifestyle    Physical activity:     Days per week: Not on file     Minutes per session: Not on file    Stress: Not on file   Relationships    Social connections:     Talks on phone: Not on file     Gets together: Not on file     Attends Episcopalian service: Not on file     Active member of club or organization: Not on file     Attends meetings of clubs or organizations: Not on file     Relationship status: Not on file    Intimate partner violence:     Fear of current or ex partner: Not on file     Emotionally abused: Not on file     Physically abused: Not on file     Forced sexual activity: Not on file   Other Topics Concern    Not on file   Social History Narrative    Always uses seat belt    Exercise habits      Family History   Problem Relation Age of Onset    Heart attack Father     Hypertension Father     Pancreatic cancer Father 61    Celiac disease Sister     Other Sister         idopathic thrombocytopenic purpura    Lupus Sister     Lung cancer Maternal Grandmother     Other Maternal Grandfather         renal failure    Breast cancer Paternal Grandmother 54     Past Medical History:   Diagnosis Date    Known health problems: none     Pregnancy     resolved: 11/5/2015       Review of Systems   Respiratory: Negative  Cardiovascular: Negative  Gastrointestinal: Negative for constipation and diarrhea  Genitourinary: Negative for difficulty urinating, pelvic pain, vaginal bleeding, vaginal discharge, itching or odor  O:  Blood pressure 140/82, weight 90 3 kg (199 lb)  Abdomen is soft and nontender  External genitals are normal without rashes or lesions  Vagina is normal without discharge or bleeding  Cervix is normal without discharge or lesion  IUD strings are normal      A:  IUD in place    P:  Patient was reassured that irregular bleeding is common for the first six months of IUD use and that this should slowly resolve over time  She will call with any persistently abnormal bleeding or other problems  She will return for her yearly exam as scheduled

## 2019-10-30 ENCOUNTER — OFFICE VISIT (OUTPATIENT)
Dept: OBGYN CLINIC | Facility: CLINIC | Age: 39
End: 2019-10-30
Payer: COMMERCIAL

## 2019-10-30 VITALS — DIASTOLIC BLOOD PRESSURE: 82 MMHG | SYSTOLIC BLOOD PRESSURE: 140 MMHG | BODY MASS INDEX: 35.25 KG/M2 | WEIGHT: 199 LBS

## 2019-10-30 DIAGNOSIS — N92.6 IRREGULAR BLEEDING: Primary | ICD-10-CM

## 2019-10-30 PROCEDURE — 99213 OFFICE O/P EST LOW 20 MIN: CPT | Performed by: PHYSICIAN ASSISTANT

## 2019-11-20 ENCOUNTER — AMB VIDEO VISIT (OUTPATIENT)
Dept: URGENT CARE | Facility: CLINIC | Age: 39
End: 2019-11-20

## 2019-11-20 DIAGNOSIS — J06.9 UPPER RESPIRATORY INFECTION, VIRAL: ICD-10-CM

## 2019-11-20 DIAGNOSIS — J45.41 MODERATE PERSISTENT ASTHMA WITH ACUTE EXACERBATION: Primary | ICD-10-CM

## 2019-11-20 PROCEDURE — EVISIT: Performed by: PHYSICIAN ASSISTANT

## 2019-11-20 RX ORDER — PREDNISONE 50 MG/1
50 TABLET ORAL DAILY
Qty: 5 TABLET | Refills: 0 | Status: SHIPPED | OUTPATIENT
Start: 2019-11-20 | End: 2019-11-25

## 2019-11-20 NOTE — CARE ANYWHERE EVISITS
Visit Summary for DAX BENJAMIN - Gender: Female - Date of Birth: [id number]  Date: [id number] - Duration: 4 minutes  Patient: Cathy BENJAMIN  Provider: Omar Lawson PA-C    Patient Contact Information  Address  [address] [facility]; [address] [place] [address]   [phone]    Visit Topics  Cold [Added By: Self - 2019-11-20]    Conversation Transcripts  [0A][0A] [Notification] You are connected with Omar Lawson PA-C, Urgent Care Specialist [0A][Notification] DAX Cristina is located in [place]  [0A][Notification] DAX Cristina has shared health history  Dayashivani Chelita  [0A]    Diagnosis    Procedures  Value: [id number] Code: CPT-4 UNLISTED E&M SERVICE    Medications Prescribed    No prescriptions ordered    Electronically signed by: Zita Trejo(NPI [id number])

## 2019-11-20 NOTE — PROGRESS NOTES
Assessment/Plan   Diagnoses and all orders for this visit:    Moderate persistent asthma with acute exacerbation  -     predniSONE 50 mg tablet; Take 1 tablet (50 mg total) by mouth daily for 5 days    Upper respiratory infection, viral     Prescription sent to the pharmacy for prednisone-use as directed  Recommend using your previously prescribed inhaler as needed for shortness of breath, wheezing or chest tightness  Saline nasal spray several times daily, Flonase a m , cool mist humidifier, Tylenol/ibuprofen as needed for fever or pain  Recommend taking a decongestant such as pseudoephedrine or phenylephrine  Subjective   Patient ID: Crow Aleman is a 34 y o  female  There were no vitals filed for this visit  HPI  The patient is a 78-year-old female who presents via video visit with the complaints of cold symptoms for approximately 3 days  She does have a history of asthma and uses an inhaler as needed  She states that her asthma is generally well controlled  Over the past few days she has developed nasal and sinus congestion  Negative facial pain or pressure  Negative headache  Negative jaw or dental pain  Negative ear pain, drainage or hearing changes  Positive sore throat with postnasal drip  Nonproductive cough with occasional shortness of breath, wheezing and chest tightness  She has not used her inhaler for her symptoms  Negative fever or shaking chills  Negative myalgias  Review of Systems   Constitutional: Positive for fatigue  Negative for activity change, chills and fever  HENT: Positive for congestion, postnasal drip, rhinorrhea, sinus pressure and sore throat  Negative for ear discharge, ear pain, facial swelling, mouth sores, sinus pain, sneezing and trouble swallowing  Eyes: Negative for pain, discharge, redness and itching  Respiratory: Positive for cough, chest tightness, shortness of breath and wheezing  Negative for apnea and stridor      Cardiovascular: Negative for chest pain  Gastrointestinal: Negative for abdominal distention, abdominal pain, diarrhea, nausea and vomiting  Genitourinary: Negative for difficulty urinating  Musculoskeletal: Negative for arthralgias and myalgias  Skin: Negative for pallor and rash  Allergic/Immunologic: Negative  Neurological: Negative for dizziness, light-headedness and headaches  Hematological: Negative  Psychiatric/Behavioral: Negative  All other systems reviewed and are negative  Objective   Physical Exam  The patient is ill appearing and in no acute distress  Voice is hoarse with nasal congestion  The patient denies facial pain with palpation  No audible wheezing

## 2019-11-20 NOTE — PATIENT INSTRUCTIONS
Prescription sent to the pharmacy for prednisone-use as directed  Recommend using your previously prescribed inhaler as needed for shortness of breath, wheezing or chest tightness  Saline nasal spray several times daily, Flonase a m , cool mist humidifier, Tylenol/ibuprofen as needed for fever or pain  Recommend taking a decongestant such as pseudoephedrine or phenylephrine

## 2019-11-20 NOTE — LETTER
November 20, 2019     Patient: Hilary Roberto   YOB: 1980   Date of Visit: 11/20/2019       To Whom it May Concern:    Robert Rocha is under my professional care  She was seen in my office on 11/20/2019  She may return to work 11/21/2019  If you have any questions or concerns, please don't hesitate to call           Sincerely,          Satnam Guevara PA-C        CC: No Recipients

## 2019-11-25 ENCOUNTER — OFFICE VISIT (OUTPATIENT)
Dept: FAMILY MEDICINE CLINIC | Facility: CLINIC | Age: 39
End: 2019-11-25
Payer: COMMERCIAL

## 2019-11-25 VITALS
HEART RATE: 83 BPM | WEIGHT: 205 LBS | BODY MASS INDEX: 37.73 KG/M2 | OXYGEN SATURATION: 98 % | HEIGHT: 62 IN | SYSTOLIC BLOOD PRESSURE: 132 MMHG | RESPIRATION RATE: 18 BRPM | DIASTOLIC BLOOD PRESSURE: 80 MMHG | TEMPERATURE: 98.6 F

## 2019-11-25 DIAGNOSIS — J20.9 ACUTE BRONCHITIS, UNSPECIFIED ORGANISM: Primary | ICD-10-CM

## 2019-11-25 PROCEDURE — 99213 OFFICE O/P EST LOW 20 MIN: CPT | Performed by: FAMILY MEDICINE

## 2019-11-25 PROCEDURE — 1036F TOBACCO NON-USER: CPT | Performed by: FAMILY MEDICINE

## 2019-11-25 RX ORDER — AZITHROMYCIN 250 MG/1
TABLET, FILM COATED ORAL
Qty: 6 TABLET | Refills: 0 | Status: SHIPPED | OUTPATIENT
Start: 2019-11-25 | End: 2019-11-30

## 2019-11-25 RX ORDER — FLUTICASONE FUROATE AND VILANTEROL 100; 25 UG/1; UG/1
1 POWDER RESPIRATORY (INHALATION) DAILY
Qty: 60 EACH | Refills: 0 | Status: SHIPPED | OUTPATIENT
Start: 2019-11-25 | End: 2020-04-03 | Stop reason: ALTCHOICE

## 2019-11-25 NOTE — PROGRESS NOTES
Assessment/Plan:    No problem-specific Assessment & Plan notes found for this encounter  Diagnoses and all orders for this visit:    Acute bronchitis, unspecified organism  -     fluticasone-vilanterol (BREO ELLIPTA) 100-25 mcg/inh inhaler; Inhale 1 puff daily Rinse mouth after use  -     azithromycin (ZITHROMAX) 250 mg tablet; 2 tabs x 1 day, 1 tab x 4 days      humidifier and saline spray     Subjective:      Patient ID: Peace Vail is a 34 y o  female  Has been coughing for 1 week   Took steroids 5 days       Cough   This is a new problem  The current episode started 1 to 4 weeks ago  The problem has been waxing and waning  The problem occurs constantly  The cough is non-productive  Associated symptoms include nasal congestion, postnasal drip, rhinorrhea, shortness of breath and wheezing  Pertinent negatives include no chest pain, chills, ear congestion, ear pain, fever, headaches, heartburn, hemoptysis, myalgias, rash, sore throat, sweats or weight loss  The treatment provided mild relief  Her past medical history is significant for asthma  There is no history of bronchiectasis, bronchitis, COPD, emphysema, environmental allergies or pneumonia  The following portions of the patient's history were reviewed and updated as appropriate: allergies, current medications, past family history, past medical history, past social history, past surgical history and problem list     Review of Systems   Constitutional: Negative for chills, fever and weight loss  HENT: Positive for postnasal drip and rhinorrhea  Negative for ear pain and sore throat  Respiratory: Positive for cough, shortness of breath and wheezing  Negative for hemoptysis  Cardiovascular: Negative for chest pain  Gastrointestinal: Negative for heartburn  Musculoskeletal: Negative for myalgias  Skin: Negative for rash  Allergic/Immunologic: Negative for environmental allergies  Neurological: Negative for headaches  Objective:      /80 (BP Location: Left arm, Patient Position: Sitting, Cuff Size: Standard)   Pulse 83   Temp 98 6 °F (37 °C) (Tympanic)   Resp 18   Ht 5' 2" (1 575 m)   Wt 93 kg (205 lb)   LMP  (LMP Unknown)   SpO2 98%   BMI 37 49 kg/m²          Physical Exam   Constitutional: She is oriented to person, place, and time  She appears well-developed and well-nourished  HENT:   Head: Normocephalic  Mouth/Throat: No oropharyngeal exudate  Eyes: Pupils are equal, round, and reactive to light  EOM are normal    Cardiovascular: Normal rate and regular rhythm  Exam reveals no friction rub  No murmur heard  Pulmonary/Chest: Effort normal and breath sounds normal    Neurological: She is alert and oriented to person, place, and time

## 2019-12-13 ENCOUNTER — APPOINTMENT (EMERGENCY)
Dept: RADIOLOGY | Facility: HOSPITAL | Age: 39
End: 2019-12-13
Payer: COMMERCIAL

## 2019-12-13 ENCOUNTER — HOSPITAL ENCOUNTER (EMERGENCY)
Facility: HOSPITAL | Age: 39
Discharge: HOME | End: 2019-12-13
Attending: EMERGENCY MEDICINE | Admitting: EMERGENCY MEDICINE
Payer: COMMERCIAL

## 2019-12-13 VITALS
TEMPERATURE: 98 F | HEART RATE: 89 BPM | OXYGEN SATURATION: 99 % | BODY MASS INDEX: 37.1 KG/M2 | SYSTOLIC BLOOD PRESSURE: 172 MMHG | RESPIRATION RATE: 18 BRPM | WEIGHT: 202.82 LBS | DIASTOLIC BLOOD PRESSURE: 94 MMHG

## 2019-12-13 DIAGNOSIS — R07.9 CHEST PAIN: Primary | ICD-10-CM

## 2019-12-13 LAB
ALBUMIN SERPL BCP-MCNC: 3.9 G/DL (ref 3.5–5)
ALP SERPL-CCNC: 70 U/L (ref 46–116)
ALT SERPL W P-5'-P-CCNC: 35 U/L (ref 12–78)
ANION GAP SERPL CALCULATED.3IONS-SCNC: 11 MMOL/L (ref 4–13)
AST SERPL W P-5'-P-CCNC: 14 U/L (ref 5–45)
BASOPHILS # BLD AUTO: 0.03 THOUSANDS/ΜL (ref 0–0.1)
BASOPHILS NFR BLD AUTO: 0 % (ref 0–1)
BILIRUB SERPL-MCNC: 0.31 MG/DL (ref 0.2–1)
BUN SERPL-MCNC: 13 MG/DL (ref 5–25)
CALCIUM SERPL-MCNC: 9.1 MG/DL (ref 8.3–10.1)
CHLORIDE SERPL-SCNC: 103 MMOL/L (ref 100–108)
CO2 SERPL-SCNC: 26 MMOL/L (ref 21–32)
CREAT SERPL-MCNC: 0.88 MG/DL (ref 0.6–1.3)
EOSINOPHIL # BLD AUTO: 0.05 THOUSAND/ΜL (ref 0–0.61)
EOSINOPHIL NFR BLD AUTO: 1 % (ref 0–6)
ERYTHROCYTE [DISTWIDTH] IN BLOOD BY AUTOMATED COUNT: 12.5 % (ref 11.6–15.1)
GFR SERPL CREATININE-BSD FRML MDRD: 82 ML/MIN/1.73SQ M
GLUCOSE SERPL-MCNC: 179 MG/DL (ref 65–140)
HCT VFR BLD AUTO: 39.8 % (ref 34.8–46.1)
HGB BLD-MCNC: 13.8 G/DL (ref 11.5–15.4)
IMM GRANULOCYTES # BLD AUTO: 0.01 THOUSAND/UL (ref 0–0.2)
IMM GRANULOCYTES NFR BLD AUTO: 0 % (ref 0–2)
LYMPHOCYTES # BLD AUTO: 3.31 THOUSANDS/ΜL (ref 0.6–4.47)
LYMPHOCYTES NFR BLD AUTO: 45 % (ref 14–44)
MCH RBC QN AUTO: 29.9 PG (ref 26.8–34.3)
MCHC RBC AUTO-ENTMCNC: 34.7 G/DL (ref 31.4–37.4)
MCV RBC AUTO: 86 FL (ref 82–98)
MONOCYTES # BLD AUTO: 0.47 THOUSAND/ΜL (ref 0.17–1.22)
MONOCYTES NFR BLD AUTO: 6 % (ref 4–12)
NEUTROPHILS # BLD AUTO: 3.43 THOUSANDS/ΜL (ref 1.85–7.62)
NEUTS SEG NFR BLD AUTO: 48 % (ref 43–75)
NRBC BLD AUTO-RTO: 0 /100 WBCS
PLATELET # BLD AUTO: 406 THOUSANDS/UL (ref 149–390)
PMV BLD AUTO: 8.7 FL (ref 8.9–12.7)
POTASSIUM SERPL-SCNC: 3.7 MMOL/L (ref 3.5–5.3)
PROT SERPL-MCNC: 7.3 G/DL (ref 6.4–8.2)
RBC # BLD AUTO: 4.62 MILLION/UL (ref 3.81–5.12)
SODIUM SERPL-SCNC: 140 MMOL/L (ref 136–145)
TROPONIN I SERPL-MCNC: <0.02 NG/ML
WBC # BLD AUTO: 7.3 THOUSAND/UL (ref 4.31–10.16)

## 2019-12-13 PROCEDURE — 80053 COMPREHEN METABOLIC PANEL: CPT | Performed by: EMERGENCY MEDICINE

## 2019-12-13 PROCEDURE — 36415 COLL VENOUS BLD VENIPUNCTURE: CPT | Performed by: EMERGENCY MEDICINE

## 2019-12-13 PROCEDURE — 84484 ASSAY OF TROPONIN QUANT: CPT | Performed by: EMERGENCY MEDICINE

## 2019-12-13 PROCEDURE — 93005 ELECTROCARDIOGRAM TRACING: CPT

## 2019-12-13 PROCEDURE — 71046 X-RAY EXAM CHEST 2 VIEWS: CPT

## 2019-12-13 PROCEDURE — 99285 EMERGENCY DEPT VISIT HI MDM: CPT | Performed by: EMERGENCY MEDICINE

## 2019-12-13 PROCEDURE — 99285 EMERGENCY DEPT VISIT HI MDM: CPT

## 2019-12-13 PROCEDURE — 85025 COMPLETE CBC W/AUTO DIFF WBC: CPT | Performed by: EMERGENCY MEDICINE

## 2019-12-13 NOTE — ED PROVIDER NOTES
History  Chief Complaint   Patient presents with    Chest Pain     Pt  reports left sided chest pain that is ongoing x 4 days, pt  mild dizziness     49-year-old female presents with chief complaint of chest pain which she has had for the past 4 days  Pain is left-sided  Patient reports occasional lightheadedness  No shortness of breath, nausea, vomiting or diaphoresis  Patient has no known medical history and takes no medications daily  Patient does not smoke and has no family history of cardiac disease  History provided by:  Patient   used: No    Chest Pain   Pain location:  L chest  Pain quality: aching and sharp    Pain radiates to the back: no    Pain severity:  Mild  Onset quality:  Gradual  Duration:  4 days  Timing:  Intermittent  Progression:  Waxing and waning  Chronicity:  New  Relieved by:  Nothing  Worsened by:  Nothing tried  Ineffective treatments:  None tried  Associated symptoms: dizziness    Associated symptoms: no anxiety, no diaphoresis, no fever, no nausea, no palpitations, no shortness of breath, not vomiting and no weakness    Risk factors: hypertension    Risk factors: no coronary artery disease, no diabetes mellitus, no immobilization, not male, not obese, no smoking and no surgery        Prior to Admission Medications   Prescriptions Last Dose Informant Patient Reported? Taking?   fluticasone-vilanterol (BREO ELLIPTA) 100-25 mcg/inh inhaler 2019 at Unknown time  No Yes   Sig: Inhale 1 puff daily Rinse mouth after use     levonorgestrel (MIRENA) 20 MCG/24HR IUD   Yes No   Si each by Intrauterine route once      Facility-Administered Medications: None       Past Medical History:   Diagnosis Date    Known health problems: none     Pregnancy     resolved: 2015       Past Surgical History:   Procedure Laterality Date    ARTHROSCOPY KNEE      BREAST SURGERY      reduction procedure    REDUCTION MAMMAPLASTY Bilateral        Family History Problem Relation Age of Onset    Heart attack Father     Hypertension Father     Pancreatic cancer Father 61    Celiac disease Sister     Other Sister         idopathic thrombocytopenic purpura    Lupus Sister     Lung cancer Maternal Grandmother     Other Maternal Grandfather         renal failure    Breast cancer Paternal Grandmother 54     I have reviewed and agree with the history as documented  Social History     Tobacco Use    Smoking status: Never Smoker    Smokeless tobacco: Never Used   Substance Use Topics    Alcohol use: Yes     Comment: social    Drug use: No        Review of Systems   Constitutional: Negative for chills, diaphoresis and fever  Respiratory: Negative for shortness of breath  Cardiovascular: Positive for chest pain  Negative for palpitations  Gastrointestinal: Negative for diarrhea, nausea and vomiting  Genitourinary: Negative for dysuria and frequency  Skin: Negative for rash  Neurological: Positive for dizziness and light-headedness  Negative for weakness  All other systems reviewed and are negative  Physical Exam  Physical Exam   Constitutional: She is oriented to person, place, and time  She appears well-developed and well-nourished  No distress  HENT:   Head: Normocephalic and atraumatic  Eyes: Pupils are equal, round, and reactive to light  EOM are normal    Neck: Normal range of motion  No JVD present  Cardiovascular: Normal rate, regular rhythm, normal heart sounds and intact distal pulses  Exam reveals no gallop and no friction rub  No murmur heard  Pulmonary/Chest: Effort normal and breath sounds normal  No respiratory distress  She has no wheezes  She has no rales  She exhibits no tenderness  Musculoskeletal: Normal range of motion  She exhibits no tenderness  Neurological: She is alert and oriented to person, place, and time  Skin: Skin is warm and dry  Psychiatric: She has a normal mood and affect   Her behavior is normal  Judgment and thought content normal    Nursing note and vitals reviewed        Vital Signs  ED Triage Vitals [12/13/19 1447]   Temperature Pulse Respirations Blood Pressure SpO2   98 °F (36 7 °C) 89 18 (!) 172/94 99 %      Temp Source Heart Rate Source Patient Position - Orthostatic VS BP Location FiO2 (%)   Oral Monitor Lying Right arm --      Pain Score       4           Vitals:    12/13/19 1447   BP: (!) 172/94   Pulse: 89   Patient Position - Orthostatic VS: Lying         Visual Acuity      ED Medications  Medications - No data to display    Diagnostic Studies  Results Reviewed     Procedure Component Value Units Date/Time    Comprehensive metabolic panel [[id number]]  (Abnormal) Collected:  12/13/19 1459    Lab Status:  Final result Specimen:  Blood from Arm, Right Updated:  12/13/19 1532     Sodium 140 mmol/L      Potassium 3 7 mmol/L      Chloride 103 mmol/L      CO2 26 mmol/L      ANION GAP 11 mmol/L      BUN 13 mg/dL      Creatinine 0 88 mg/dL      Glucose 179 mg/dL      Calcium 9 1 mg/dL      AST 14 U/L      ALT 35 U/L      Alkaline Phosphatase 70 U/L      Total Protein 7 3 g/dL      Albumin 3 9 g/dL      Total Bilirubin 0 31 mg/dL      eGFR 82 ml/min/1 73sq m     Narrative:       [place] guidelines for Chronic Kidney Disease (CKD):     Stage 1 with normal or high GFR (GFR > 90 mL/min/1 73 square meters)    Stage 2 Mild CKD (GFR = 60-89 mL/min/1 73 square meters)    Stage 3A Moderate CKD (GFR = 45-59 mL/min/1 73 square meters)    Stage 3B Moderate CKD (GFR = 30-44 mL/min/1 73 square meters)    Stage 4 Severe CKD (GFR = 15-29 mL/min/1 73 square meters)    Stage 5 End Stage CKD (GFR <15 mL/min/1 73 square meters)  Note: GFR calculation is accurate only with a steady state creatinine    Troponin I [[id number]]  (Normal) Collected:  12/13/19 1459    Lab Status:  Final result Specimen:  Blood from Arm, Right Updated:  12/13/19 1524     Troponin I <0 02 ng/mL     CBC and differential [[id number]]  (Abnormal) Collected:  12/13/19 1459    Lab Status:  Final result Specimen:  Blood from Arm, Right Updated:  12/13/19 1505     WBC 7 30 Thousand/uL      RBC 4 62 Million/uL      Hemoglobin 13 8 g/dL      Hematocrit 39 8 %      MCV 86 fL      MCH 29 9 pg      MCHC 34 7 g/dL      RDW 12 5 %      MPV 8 7 fL      Platelets 758 Thousands/uL      nRBC 0 /100 WBCs      Neutrophils Relative 48 %      Immat GRANS % 0 %      Lymphocytes Relative 45 %      Monocytes Relative 6 %      Eosinophils Relative 1 %      Basophils Relative 0 %      Neutrophils Absolute 3 43 Thousands/µL      Immature Grans Absolute 0 01 Thousand/uL      Lymphocytes Absolute 3 31 Thousands/µL      Monocytes Absolute 0 47 Thousand/µL      Eosinophils Absolute 0 05 Thousand/µL      Basophils Absolute 0 03 Thousands/µL                  XR chest 2 views   ED Interpretation by Tyron Oglesby MD (12/13 1532)   This film was interpreted independently by me  No infiltrate, cardiac silhouette normal, no pleural effusions or pulmonary edema                    Procedures  ECG 12 Lead Documentation Only  Date/Time: 12/13/2019 3:07 PM  Performed by: Tyron Oglesby MD  Authorized by: Tyron Oglesby MD     Indications / Diagnosis:  Chest pain  Patient location:  ED  Previous ECG:     Previous ECG:  Compared to current    Comparison ECG info:  82    Similarity:  No change  Interpretation:     Interpretation: abnormal    Rate:     ECG rate:  82    ECG rate assessment: normal    Ectopy:     Ectopy: none    QRS:     QRS axis:  Normal  Conduction:     Conduction: abnormal      Abnormal conduction: incomplete RBBB    ST segments:     ST segments:  Normal  T waves:     T waves: normal               ED Course         HEART Risk Score      Most Recent Value   History  0 Filed at: 12/13/2019 1532   ECG  0 Filed at: 12/13/2019 1532   Age  0 Filed at: 12/13/2019 1532   Risk Factors  0 Filed at: 12/13/2019 1532   Troponin  0 Filed at: 12/13/2019 1532 Heart Score Risk Calculator   History  0 Filed at: 12/13/2019 1532   ECG  0 Filed at: 12/13/2019 1532   Age  0 Filed at: 12/13/2019 1532   Risk Factors  0 Filed at: 12/13/2019 1532   Troponin  0 Filed at: 12/13/2019 1532   HEART Score  0 Filed at: 12/13/2019 1532   HEART Score  0 Filed at: 12/13/2019 1532            PERC Rule for PE      Most Recent Value   PERC Rule for PE   Age >=50  0 Filed at: 12/13/2019 1532   HR >=100  0 Filed at: 12/13/2019 1532   O2 Sat on room air < 95%  0 Filed at: 12/13/2019 1532   History of PE or DVT  0 Filed at: 12/13/2019 1532   Recent trauma or surgery  0 Filed at: 12/13/2019 1532   Hemoptysis  0 Filed at: 12/13/2019 1532   Exogenous estrogen  0 Filed at: 12/13/2019 1532   Unilateral leg swelling  0 Filed at: 12/13/2019 1532   PERC Rule for PE Results  0 Filed at: 12/13/2019 1532                Wells' Criteria for PE      Most Recent Value   Wells' Criteria for PE   Clinical signs and symptoms of DVT  0 Filed at: 12/13/2019 1532   PE is primary diagnosis or equally likely  0 Filed at: 12/13/2019 1532   HR >100  0 Filed at: 12/13/2019 1532   Immobilization at least 3 days or Surgery in the previous 4 weeks  0 Filed at: 12/13/2019 1532   Previous, objectively diagnosed PE or DVT  0 Filed at: 12/13/2019 1532   Hemoptysis  0 Filed at: 12/13/2019 1532   Malignancy with treatment within 6 months or palliative  0 Filed at: 12/13/2019 1532   Wells' Criteria Total  0 Filed at: 12/13/2019 1532            MDM  Number of Diagnoses or Management Options  Chest pain: new and requires workup  Diagnosis management comments: Background: 37 y o  female with chest pain    Differential DX includes but is not limited to: acs/mi, pe, pleurisy, dissection, pneumonia, musculoskeletal chest pain    Plan: cardiac workup         Amount and/or Complexity of Data Reviewed  Clinical lab tests: ordered and reviewed  Tests in the radiology section of CPT®: ordered and reviewed    Risk of Complications, Morbidity, and/or Mortality  Presenting problems: high  Diagnostic procedures: high  Management options: high    Patient Progress  Patient progress: stable        Disposition  Final diagnoses:   Chest pain     Time reflects when diagnosis was documented in both MDM as applicable and the Disposition within this note     Time User Action Codes Description Comment    12/13/2019  3:33 PM eCcilia Ha Add [R07 9] Chest pain       ED Disposition     ED Disposition Condition Date/Time Comment    Discharge Stable Fri Dec 13, 2019  3:18 PM [address] discharge to home/self care  Follow-up Information     Follow up With Specialties Details Why Contact Info    Mesha Byrd MD Family Medicine Schedule an appointment as soon as possible for a visit in 1 week  [address]  [address]  [phone]            Patient's Medications   Discharge Prescriptions    No medications on file     No discharge procedures on file      ED Provider  Electronically Signed by           Amber Avila MD  12/13/19 Marga Cloud MD  12/13/19 1231

## 2019-12-15 LAB
ATRIAL RATE: 82 BPM
P AXIS: 47 DEGREES
PR INTERVAL: 146 MS
QRS AXIS: 53 DEGREES
QRSD INTERVAL: 96 MS
QT INTERVAL: 364 MS
QTC INTERVAL: 425 MS
T WAVE AXIS: 10 DEGREES
VENTRICULAR RATE: 82 BPM

## 2019-12-15 PROCEDURE — 93010 ELECTROCARDIOGRAM REPORT: CPT | Performed by: INTERNAL MEDICINE

## 2019-12-16 ENCOUNTER — TELEPHONE (OUTPATIENT)
Dept: FAMILY MEDICINE CLINIC | Facility: CLINIC | Age: 39
End: 2019-12-16

## 2019-12-16 NOTE — TELEPHONE ENCOUNTER
Patient called to schedule ED Follow Up  Because your schedule is so booked, I put her in for 1/30 for 15 minutes and used a same day slot  She is aware that it may need to be changed if you're not okay with this      Please advise

## 2019-12-16 NOTE — TELEPHONE ENCOUNTER
PT SENT A REQUEST THROUGH [facility] FOR AN APPT FOR CHEST PAIN AND BLOOD PRESSURE   I TALKED TO SUMAYA AND SAID TO SEND HER TO ER AND I CALLED PT AND LEFT HER A MESSAGE TO GO TO ER

## 2019-12-18 ENCOUNTER — OFFICE VISIT (OUTPATIENT)
Dept: FAMILY MEDICINE CLINIC | Facility: CLINIC | Age: 39
End: 2019-12-18
Payer: COMMERCIAL

## 2019-12-18 VITALS
BODY MASS INDEX: 37.25 KG/M2 | TEMPERATURE: 99.2 F | DIASTOLIC BLOOD PRESSURE: 90 MMHG | HEART RATE: 78 BPM | SYSTOLIC BLOOD PRESSURE: 120 MMHG | RESPIRATION RATE: 18 BRPM | HEIGHT: 62 IN | WEIGHT: 202.4 LBS | OXYGEN SATURATION: 97 %

## 2019-12-18 DIAGNOSIS — R07.89 MID STERNAL CHEST PAIN: Primary | ICD-10-CM

## 2019-12-18 DIAGNOSIS — R73.03 PRE-DIABETES: ICD-10-CM

## 2019-12-18 DIAGNOSIS — K21.9 GASTROESOPHAGEAL REFLUX DISEASE WITHOUT ESOPHAGITIS: ICD-10-CM

## 2019-12-18 PROCEDURE — 3008F BODY MASS INDEX DOCD: CPT | Performed by: FAMILY MEDICINE

## 2019-12-18 PROCEDURE — 1036F TOBACCO NON-USER: CPT | Performed by: FAMILY MEDICINE

## 2019-12-18 PROCEDURE — 99213 OFFICE O/P EST LOW 20 MIN: CPT | Performed by: FAMILY MEDICINE

## 2019-12-18 RX ORDER — PANTOPRAZOLE SODIUM 40 MG/1
40 TABLET, DELAYED RELEASE ORAL DAILY
Qty: 90 TABLET | Refills: 0 | Status: SHIPPED | OUTPATIENT
Start: 2019-12-18 | End: 2020-04-03 | Stop reason: ALTCHOICE

## 2019-12-18 NOTE — PROGRESS NOTES
Assessment/Plan:    No problem-specific Assessment & Plan notes found for this encounter  Diagnoses and all orders for this visit:    Mid sternal chest pain  -     H  pylori antigen, stool; Future    Gastroesophageal reflux disease without esophagitis  -     Comprehensive metabolic panel  -     Celiac Disease Antibody Profile  -     pantoprazole (PROTONIX) 40 mg tablet; Take 1 tablet (40 mg total) by mouth daily    Pre-diabetes  -     Hemoglobin A1C      to avoid triggers    Subjective:      Patient ID: Chelsi Stapleton is a 37 y o  female  HPI  Chest pain on and off for the last few days   Went to ED 12/13/19 for this   EKG was normal , Troponin was negative  + burning sensation in her chest   + family h/o celiac disease   No nausea or vomiting     The following portions of the patient's history were reviewed and updated as appropriate: allergies, current medications, past family history, past medical history, past social history, past surgical history and problem list     Review of Systems   Constitutional: Negative  HENT: Negative  Respiratory: Negative  Cardiovascular: Negative  Gastrointestinal: Positive for abdominal pain  Endocrine: Negative for cold intolerance and heat intolerance  Genitourinary: Negative for difficulty urinating and dyspareunia  Musculoskeletal: Negative for arthralgias and back pain  Allergic/Immunologic: Negative for environmental allergies and food allergies  Neurological: Negative for dizziness and facial asymmetry  Hematological: Negative for adenopathy  Does not bruise/bleed easily  Psychiatric/Behavioral: Negative for agitation and behavioral problems           Objective:      /90 (BP Location: Right arm, Patient Position: Sitting, Cuff Size: Large)   Pulse 78   Temp 99 2 °F (37 3 °C) (Tympanic)   Resp 18   Ht 5' 2" (1 575 m)   Wt 91 8 kg (202 lb 6 4 oz)   LMP 12/13/2019   SpO2 97%   BMI 37 02 kg/m²          Physical Exam Constitutional: She appears well-developed and well-nourished  Eyes: Pupils are equal, round, and reactive to light  EOM are normal    Cardiovascular: Normal rate and regular rhythm  Pulmonary/Chest: Effort normal and breath sounds normal    Abdominal: There is tenderness  epigastric   Neurological: No cranial nerve deficit  Coordination normal    Skin: No erythema  No pallor

## 2019-12-19 ENCOUNTER — TELEPHONE (OUTPATIENT)
Dept: OBGYN CLINIC | Facility: CLINIC | Age: 39
End: 2019-12-19

## 2019-12-19 NOTE — TELEPHONE ENCOUNTER
Pt thinks she has a yeast infection, her only symptom is "pressure, it just feels different " No burning, discharge, odor

## 2019-12-19 NOTE — TELEPHONE ENCOUNTER
pt was offered an appt for 12/20 she couldn't make that, I advised she goes to urgent care to have her sx assessed, she couldn't give me any real sx other then feeling "off", pt agreed to go

## 2019-12-21 ENCOUNTER — APPOINTMENT (OUTPATIENT)
Dept: LAB | Facility: CLINIC | Age: 39
End: 2019-12-21
Payer: COMMERCIAL

## 2019-12-21 LAB
ALBUMIN SERPL BCP-MCNC: 3.7 G/DL (ref 3.5–5)
ALP SERPL-CCNC: 55 U/L (ref 46–116)
ALT SERPL W P-5'-P-CCNC: 53 U/L (ref 12–78)
ANION GAP SERPL CALCULATED.3IONS-SCNC: 9 MMOL/L (ref 4–13)
AST SERPL W P-5'-P-CCNC: 23 U/L (ref 5–45)
BILIRUB SERPL-MCNC: 0.52 MG/DL (ref 0.2–1)
BUN SERPL-MCNC: 13 MG/DL (ref 5–25)
CALCIUM SERPL-MCNC: 8.7 MG/DL (ref 8.3–10.1)
CHLORIDE SERPL-SCNC: 106 MMOL/L (ref 100–108)
CO2 SERPL-SCNC: 25 MMOL/L (ref 21–32)
CREAT SERPL-MCNC: 0.71 MG/DL (ref 0.6–1.3)
EST. AVERAGE GLUCOSE BLD GHB EST-MCNC: 108 MG/DL
GFR SERPL CREATININE-BSD FRML MDRD: 106 ML/MIN/1.73SQ M
GLUCOSE P FAST SERPL-MCNC: 94 MG/DL (ref 65–99)
HBA1C MFR BLD: 5.4 % (ref 4.2–6.3)
POTASSIUM SERPL-SCNC: 4.1 MMOL/L (ref 3.5–5.3)
PROT SERPL-MCNC: 6.9 G/DL (ref 6.4–8.2)
SODIUM SERPL-SCNC: 140 MMOL/L (ref 136–145)

## 2019-12-21 PROCEDURE — 83516 IMMUNOASSAY NONANTIBODY: CPT | Performed by: FAMILY MEDICINE

## 2019-12-21 PROCEDURE — 3044F HG A1C LEVEL LT 7.0%: CPT | Performed by: FAMILY MEDICINE

## 2019-12-21 PROCEDURE — 80053 COMPREHEN METABOLIC PANEL: CPT | Performed by: FAMILY MEDICINE

## 2019-12-21 PROCEDURE — 82784 ASSAY IGA/IGD/IGG/IGM EACH: CPT | Performed by: FAMILY MEDICINE

## 2019-12-21 PROCEDURE — 86255 FLUORESCENT ANTIBODY SCREEN: CPT | Performed by: FAMILY MEDICINE

## 2019-12-21 PROCEDURE — 36415 COLL VENOUS BLD VENIPUNCTURE: CPT | Performed by: FAMILY MEDICINE

## 2019-12-21 PROCEDURE — 83036 HEMOGLOBIN GLYCOSYLATED A1C: CPT | Performed by: FAMILY MEDICINE

## 2019-12-23 LAB
ENDOMYSIUM IGA SER QL: NEGATIVE
GLIADIN PEPTIDE IGA SER-ACNC: 9 UNITS (ref 0–19)
GLIADIN PEPTIDE IGG SER-ACNC: 2 UNITS (ref 0–19)
IGA SERPL-MCNC: 142 MG/DL (ref 87–352)
TTG IGA SER-ACNC: <2 U/ML (ref 0–3)
TTG IGG SER-ACNC: <2 U/ML (ref 0–5)

## 2019-12-27 ENCOUNTER — TRANSCRIBE ORDERS (OUTPATIENT)
Dept: LAB | Facility: CLINIC | Age: 39
End: 2019-12-27

## 2019-12-27 ENCOUNTER — APPOINTMENT (OUTPATIENT)
Dept: LAB | Facility: CLINIC | Age: 39
End: 2019-12-27
Payer: COMMERCIAL

## 2019-12-27 DIAGNOSIS — R07.89 MID STERNAL CHEST PAIN: ICD-10-CM

## 2019-12-27 PROCEDURE — 87338 HPYLORI STOOL AG IA: CPT

## 2019-12-28 LAB — H PYLORI AG STL QL IA: NEGATIVE

## 2019-12-31 DIAGNOSIS — R10.84 GENERALIZED ABDOMINAL PAIN: Primary | ICD-10-CM

## 2020-01-04 ENCOUNTER — HOSPITAL ENCOUNTER (OUTPATIENT)
Dept: ULTRASOUND IMAGING | Facility: HOSPITAL | Age: 40
Discharge: HOME | End: 2020-01-04
Payer: COMMERCIAL

## 2020-01-04 DIAGNOSIS — R10.84 GENERALIZED ABDOMINAL PAIN: ICD-10-CM

## 2020-01-04 PROCEDURE — 76700 US EXAM ABDOM COMPLETE: CPT

## 2020-03-12 ENCOUNTER — TELEPHONE (OUTPATIENT)
Dept: FAMILY MEDICINE CLINIC | Facility: CLINIC | Age: 40
End: 2020-03-12

## 2020-03-12 DIAGNOSIS — J34.89 INFECTED LESION IN NOSE: Primary | ICD-10-CM

## 2020-03-12 RX ORDER — CLINDAMYCIN PHOSPHATE 10 MG/G
GEL TOPICAL 2 TIMES DAILY
Qty: 30 G | Refills: 0 | Status: SHIPPED | OUTPATIENT
Start: 2020-03-12 | End: 2020-04-03 | Stop reason: ALTCHOICE

## 2020-03-12 NOTE — TELEPHONE ENCOUNTER
Patient called and wanted to know if Dr Marques can squeeze her in today or tomorrow, symptoms feels like a zit in nose and now it hurts and feels swollen   Please advise

## 2020-03-20 ENCOUNTER — TELEPHONE (OUTPATIENT)
Dept: OBGYN CLINIC | Facility: CLINIC | Age: 40
End: 2020-03-20

## 2020-03-20 DIAGNOSIS — R30.0 DYSURIA: Primary | ICD-10-CM

## 2020-03-20 RX ORDER — NITROFURANTOIN 25; 75 MG/1; MG/1
100 CAPSULE ORAL 2 TIMES DAILY
Qty: 14 CAPSULE | Refills: 0 | Status: SHIPPED | OUTPATIENT
Start: 2020-03-20 | End: 2020-03-27

## 2020-03-20 NOTE — TELEPHONE ENCOUNTER
----- [facility] sent at 3/20/2020  8:30 AM EDT -----  Regarding: Non-Urgent Medical Question  Contact: [phone]  I  have a mild UTI  Should I make an appointment or is there something I can do from home?

## 2020-03-20 NOTE — TELEPHONE ENCOUNTER
sheila garcia  [birth year]  [phone]    Pt seen for yrly 10/2019 by rosalva  C/o u/a burning and frequency  req rx  Allergy to sulfa  Order placed in epic for macrobid  Pharm, [facility]  Needs to be signed or changed if not agreeable  Thanks  tt to am

## 2020-04-02 ENCOUNTER — TELEPHONE (OUTPATIENT)
Dept: OBGYN CLINIC | Facility: CLINIC | Age: 40
End: 2020-04-02

## 2020-04-03 ENCOUNTER — TELEPHONE (OUTPATIENT)
Dept: OBGYN CLINIC | Facility: CLINIC | Age: 40
End: 2020-04-03

## 2020-04-03 ENCOUNTER — TELEMEDICINE (OUTPATIENT)
Dept: OBGYN CLINIC | Facility: CLINIC | Age: 40
End: 2020-04-03
Payer: COMMERCIAL

## 2020-04-03 VITALS — HEART RATE: 98 BPM

## 2020-04-03 DIAGNOSIS — N92.6 IRREGULAR BLEEDING: Primary | ICD-10-CM

## 2020-04-03 PROCEDURE — 99214 OFFICE O/P EST MOD 30 MIN: CPT | Performed by: PHYSICIAN ASSISTANT

## 2020-04-23 ENCOUNTER — TELEPHONE (OUTPATIENT)
Dept: GASTROENTEROLOGY | Facility: AMBULARY SURGERY CENTER | Age: 40
End: 2020-04-23

## 2020-04-27 ENCOUNTER — TELEMEDICINE (OUTPATIENT)
Dept: GASTROENTEROLOGY | Facility: AMBULARY SURGERY CENTER | Age: 40
End: 2020-04-27
Payer: COMMERCIAL

## 2020-04-27 DIAGNOSIS — K21.9 GASTROESOPHAGEAL REFLUX DISEASE, ESOPHAGITIS PRESENCE NOT SPECIFIED: Primary | ICD-10-CM

## 2020-04-27 DIAGNOSIS — K76.0 HEPATIC STEATOSIS: ICD-10-CM

## 2020-04-27 DIAGNOSIS — Z80.0 FAMILY HISTORY OF COLON CANCER: ICD-10-CM

## 2020-04-27 PROCEDURE — 99243 OFF/OP CNSLTJ NEW/EST LOW 30: CPT | Performed by: INTERNAL MEDICINE

## 2020-04-28 ENCOUNTER — TELEPHONE (OUTPATIENT)
Dept: GASTROENTEROLOGY | Facility: AMBULARY SURGERY CENTER | Age: 40
End: 2020-04-28

## 2020-07-01 ENCOUNTER — OFFICE VISIT (OUTPATIENT)
Dept: FAMILY MEDICINE CLINIC | Facility: CLINIC | Age: 40
End: 2020-07-01
Payer: COMMERCIAL

## 2020-07-01 VITALS
RESPIRATION RATE: 18 BRPM | WEIGHT: 188.2 LBS | DIASTOLIC BLOOD PRESSURE: 88 MMHG | HEART RATE: 98 BPM | SYSTOLIC BLOOD PRESSURE: 138 MMHG | TEMPERATURE: 97.6 F | HEIGHT: 62 IN | OXYGEN SATURATION: 98 % | BODY MASS INDEX: 34.63 KG/M2

## 2020-07-01 DIAGNOSIS — H81.11 BENIGN PAROXYSMAL POSITIONAL VERTIGO OF RIGHT EAR: Primary | ICD-10-CM

## 2020-07-01 DIAGNOSIS — Z12.31 VISIT FOR SCREENING MAMMOGRAM: ICD-10-CM

## 2020-07-01 PROCEDURE — 1036F TOBACCO NON-USER: CPT | Performed by: FAMILY MEDICINE

## 2020-07-01 PROCEDURE — 99213 OFFICE O/P EST LOW 20 MIN: CPT | Performed by: FAMILY MEDICINE

## 2020-07-01 PROCEDURE — 3008F BODY MASS INDEX DOCD: CPT | Performed by: FAMILY MEDICINE

## 2020-07-01 RX ORDER — MECLIZINE HYDROCHLORIDE 25 MG/1
25 TABLET ORAL EVERY 8 HOURS SCHEDULED
Qty: 30 TABLET | Refills: 0 | Status: SHIPPED | OUTPATIENT
Start: 2020-07-01 | End: 2020-08-18 | Stop reason: ALTCHOICE

## 2020-07-01 NOTE — PROGRESS NOTES
Assessment/Plan:    No problem-specific Assessment & Plan notes found for this encounter  Diagnoses and all orders for this visit:    Benign paroxysmal positional vertigo of right ear  -     meclizine (ANTIVERT) 25 mg tablet; Take 1 tablet (25 mg total) by mouth every 8 (eight) hours    Visit for screening mammogram  -     Mammo screening bilateral w cad; Future          Subjective:      Patient ID: Radha Galan is a 37 y o  female  Started trying coffee with green tea powder for 1 week and started to feel dizzy on and off 5 days Into the regimen   Total of 3 episodes lasting 1 hour each for the last 5 days   Last episode was today and lasted 20 minutes   Feel off balance at times  Some buzzing in her right ear on and off for 1 month     Dizziness   This is a new problem  The current episode started in the past 7 days  The problem has been waxing and waning  Associated symptoms include nausea and vertigo  Pertinent negatives include no abdominal pain, anorexia, arthralgias, change in bowel habit, chest pain, chills, congestion, coughing, diaphoresis, fatigue, fever, headaches, joint swelling, myalgias, neck pain, numbness, rash, sore throat, swollen glands, urinary symptoms, visual change, vomiting or weakness  Exacerbated by: laying down, getting up  She has tried rest for the symptoms  The treatment provided no relief  The following portions of the patient's history were reviewed and updated as appropriate: allergies, current medications, past family history, past medical history, past social history, past surgical history and problem list     Review of Systems   Constitutional: Negative for chills, diaphoresis, fatigue and fever  HENT: Negative for congestion and sore throat  Respiratory: Negative for cough  Cardiovascular: Negative for chest pain  Gastrointestinal: Positive for nausea  Negative for abdominal pain, anorexia, change in bowel habit and vomiting     Musculoskeletal: Negative for arthralgias, joint swelling, myalgias and neck pain  Skin: Negative for rash  Neurological: Positive for dizziness and vertigo  Negative for weakness, numbness and headaches  Objective:      /88 (BP Location: Right arm, Patient Position: Sitting, Cuff Size: Large)   Pulse 98   Temp 97 6 °F (36 4 °C) (Temporal)   Resp 18   Ht 5' 2" (1 575 m)   Wt 85 4 kg (188 lb 3 2 oz)   SpO2 98%   BMI 34 42 kg/m²          Physical Exam   Constitutional: She is oriented to person, place, and time  She appears well-developed and well-nourished  HENT:   Head: Normocephalic and atraumatic  Neck: No tracheal deviation present  No thyromegaly present  Cardiovascular: Normal rate and regular rhythm  No murmur heard  Pulmonary/Chest: Effort normal and breath sounds normal    Musculoskeletal: She exhibits no edema or deformity  Neurological: She is alert and oriented to person, place, and time  No cranial nerve deficit  Coordination normal    Skin: No erythema  No pallor  Psychiatric: She has a normal mood and affect   Her behavior is normal

## 2020-07-10 ENCOUNTER — ANNUAL EXAM (OUTPATIENT)
Dept: OBGYN CLINIC | Facility: CLINIC | Age: 40
End: 2020-07-10
Payer: COMMERCIAL

## 2020-07-10 VITALS
WEIGHT: 188 LBS | BODY MASS INDEX: 33.31 KG/M2 | SYSTOLIC BLOOD PRESSURE: 122 MMHG | HEIGHT: 63 IN | DIASTOLIC BLOOD PRESSURE: 78 MMHG

## 2020-07-10 DIAGNOSIS — Z01.419 ENCNTR FOR GYN EXAM (GENERAL) (ROUTINE) W/O ABN FINDINGS: Primary | ICD-10-CM

## 2020-07-10 DIAGNOSIS — Z12.31 ENCOUNTER FOR SCREENING MAMMOGRAM FOR MALIGNANT NEOPLASM OF BREAST: ICD-10-CM

## 2020-07-10 PROCEDURE — 87624 HPV HI-RISK TYP POOLED RSLT: CPT | Performed by: PHYSICIAN ASSISTANT

## 2020-07-10 PROCEDURE — S0612 ANNUAL GYNECOLOGICAL EXAMINA: HCPCS | Performed by: PHYSICIAN ASSISTANT

## 2020-07-10 PROCEDURE — G0145 SCR C/V CYTO,THINLAYER,RESCR: HCPCS | Performed by: PHYSICIAN ASSISTANT

## 2020-07-10 PROCEDURE — 3008F BODY MASS INDEX DOCD: CPT | Performed by: PHYSICIAN ASSISTANT

## 2020-07-10 PROCEDURE — 58301 REMOVE INTRAUTERINE DEVICE: CPT | Performed by: PHYSICIAN ASSISTANT

## 2020-07-10 NOTE — PROGRESS NOTES
Carmen Roque [facility]  1980      CC:  IUD removal    S:  37 y o  female here for IUD removal   The reason for her IUD removal is irregular bleeding  We reviewed the risks of IUD removal including pain and irregular bleeding  O:   Blood pressure 122/78, height 5' 2 99" (1 6 m), weight 85 3 kg (188 lb), last menstrual period 06/19/2020  Patient appears well and is in no distress  Abdomen is soft and nontender  External genitals are normal without rash or lesion  Vagina is normal without discharge  Cervix is normal without discharge or lesion  IUD strings are normal      PROCEDURE:   IUD strings were grasped with a ring forceps and the IUD was removed by gently pulling on the strings  The IUD was removed in its entirety and was discarded  There were no complications and the patient tolerated the procedure well  A:   IUD removal      P:  Return for yearly exam or sooner as needed

## 2020-07-10 NOTE — PROGRESS NOTES
Mahnaz English [place]  1980      CC:  Yearly exam    S:  37 y o  female here for yearly exam She has a Mirena since 9/2019  She has two week long bleeds of spotting every month  She gets a migraine on day 1 of her menses  She feels these are due to her Mirena  However, in the past, she says she has had two week long bleeds on any birth control she has been on in the past    She has had headaches on Lo Loestrin in the past  She also had elevated blood pressures on Lo Loestrin  She tried Lysteda in the past which did help  She will return to using this PRN  Off of birth control, she has very heavy periods with a 7 day bleed where she has 3-4 days where she will saturate a regular pad in an hour  She has not gone for her ultrasound that I ordered back in April to follow up on her fibroids  Sexual activity: She is sexually active without pain, bleeding or dryness  Contraception: She uses Mirena for contraception  Her  is scheduled for a vasectomy  She is aware to use condoms until he is cleared by urology  Last Pap 3/16/15 neg/neg  Last Mammo 7/19/19 neg    We reviewed ASCCP guidelines for Pap testing today       Family hx of breast cancer: PGM  Family hx of ovarian cancer: no  Family hx of colon cancer: no    Current Outpatient Medications:     levonorgestrel (MIRENA) 20 MCG/24HR IUD, 1 each by Intrauterine route once, Disp: , Rfl:     meclizine (ANTIVERT) 25 mg tablet, Take 1 tablet (25 mg total) by mouth every 8 (eight) hours, Disp: 30 tablet, Rfl: 0  Social History     Socioeconomic History    Marital status: /Civil Union     Spouse name: Not on file    Number of children: Not on file    Years of education: Not on file    Highest education level: Not on file   Occupational History    Not on file   Social Needs    Financial resource strain: Not on file    Food insecurity:     Worry: Not on file     Inability: Not on file    Transportation needs:     Medical: Not on file Non-medical: Not on file   Tobacco Use    Smoking status: Never Smoker    Smokeless tobacco: Never Used   Substance and Sexual Activity    Alcohol use: Yes     Frequency: Never     Drinks per session: 1 or 2     Binge frequency: Never     Comment: social    Drug use: No    Sexual activity: Yes     Partners: Male     Birth control/protection: OCP   Lifestyle    Physical activity:     Days per week: Not on file     Minutes per session: Not on file    Stress: Not on file   Relationships    Social connections:     Talks on phone: Not on file     Gets together: Not on file     Attends Gnosticist service: Not on file     Active member of club or organization: Not on file     Attends meetings of clubs or organizations: Not on file     Relationship status: Not on file    Intimate partner violence:     Fear of current or ex partner: Not on file     Emotionally abused: Not on file     Physically abused: Not on file     Forced sexual activity: Not on file   Other Topics Concern    Not on file   Social History Narrative    Always uses seat belt    Exercise habits      Family History   Problem Relation Age of Onset    Heart attack Father     Hypertension Father     Pancreatic cancer Father 61    Celiac disease Sister     Other Sister         idopathic thrombocytopenic purpura    Lupus Sister     Lung cancer Maternal Grandmother     Other Maternal Grandfather         renal failure    Breast cancer Paternal Grandmother 54      Past Medical History:   Diagnosis Date    Known health problems: none     Pregnancy     resolved: 11/5/2015        Review of Systems   Respiratory: Negative  Cardiovascular: Negative  Gastrointestinal: Negative for constipation and diarrhea  Genitourinary: Negative for difficulty urinating, pelvic pain, vaginal bleeding, vaginal discharge, itching or odor      O:  Blood pressure 122/78, height 5' 2 99" (1 6 m), weight 85 3 kg (188 lb), last menstrual period 06/19/2020  Patient appears well and is not in distress  Neck is supple without masses  Breasts are symmetrical without mass, tenderness, nipple discharge, skin changes or adenopathy  Abdomen is soft and nontender without masses  External genitals are normal without lesions or rashes  Urethral meatus and urethra are normal  Bladder is normal to palpation  Vagina is normal without discharge or bleeding  Cervix is normal without discharge or lesion  Uterus is normal, mobile, nontender without palpable mass  Adnexa are normal, nontender, without palpable mass  A:   Yearly exam     Irregular menses   Fibroids    P:   Pap and HPV today     She has a mammo slip from her PCP   Check pelvic US   Lysteda PRN for heavy bleeding, call if ineffective    RTO one year for yearly exam or sooner as needed

## 2020-07-13 ENCOUNTER — TELEPHONE (OUTPATIENT)
Dept: OBGYN CLINIC | Facility: CLINIC | Age: 40
End: 2020-07-13

## 2020-07-13 NOTE — TELEPHONE ENCOUNTER
Pt had IUD removed 07/10/2020 by provider [facility] She has a Mirena since 9/2019  She has two week long bleeds of spotting every month  She gets a migraine on day 1 of her menses  She feels these are due to her Mirena  However, in the past, she says she has had two week long bleeds on any birth control she has been on in the past     She has had headaches on Lo Loestrin in the past  She also had elevated blood pressures on Lo Loestrin       She tried Lysteda in the past which did help  She will return to using this PRN     Off of birth control, she has very heavy periods with a 7 day bleed where she has 3-4 days where she will saturate a regular pad in an hour       She has not gone for her ultrasound that I ordered back in April to follow up on her fibroids  I returned pt call she stated IUD removed 07/10/2020 saturating a regular tampon every two hours with quarter sized clots  Pt asked if she has been taking lysteda PRN as advised last visit  Pt stated she has not but will start taking PRN  I advised pt on ultrasound ordered back in April  Pt stated she was going to call today and schedule due to she has to schedule a mammogram as well  I expressed importance of having it done as it will give a better insight as to what is going on with her  Pt agreed to plan of action and to call bk should she have any other questions or concerns  I advised pt I will forward information to the provider as an FYI update and if she has any other recommendations we will give her a call bk when provider returns  Pt agreed

## 2020-07-13 NOTE — TELEPHONE ENCOUNTER
----- Message from [facility] sent at 7/13/2020 10:29 AM EDT -----  Regarding: Visit Follow-Up Question  Contact: [phone]  Marj Han,    Is it normal bleed after you get the iud removed?      Socorro Ortega

## 2020-07-15 LAB
HPV HR 12 DNA CVX QL NAA+PROBE: NEGATIVE
HPV16 DNA CVX QL NAA+PROBE: NEGATIVE
HPV18 DNA CVX QL NAA+PROBE: NEGATIVE

## 2020-07-17 LAB
LAB AP GYN PRIMARY INTERPRETATION: NORMAL
Lab: NORMAL

## 2020-07-21 ENCOUNTER — HOSPITAL ENCOUNTER (OUTPATIENT)
Dept: ULTRASOUND IMAGING | Facility: HOSPITAL | Age: 40
Discharge: HOME | End: 2020-07-21
Payer: COMMERCIAL

## 2020-07-21 DIAGNOSIS — N92.0 MENORRHAGIA WITH REGULAR CYCLE: ICD-10-CM

## 2020-07-21 PROCEDURE — 76856 US EXAM PELVIC COMPLETE: CPT

## 2020-07-21 PROCEDURE — 76830 TRANSVAGINAL US NON-OB: CPT

## 2020-07-23 ENCOUNTER — TELEPHONE (OUTPATIENT)
Dept: OBGYN CLINIC | Facility: CLINIC | Age: 40
End: 2020-07-23

## 2020-07-23 NOTE — TELEPHONE ENCOUNTER
----- Message from [facility]  [place] sent at 7/22/2020  6:00 PM EDT -----  Regarding: Non-Urgent Medical Question  Contact: [phone]  I all of a sudden have a white spot on my nipple which looks like a bleb? Is this something I should be concerned about? I am not sure if it is because of the hormone shift or not  It does not hurt at all it's just there

## 2020-08-18 ENCOUNTER — OFFICE VISIT (OUTPATIENT)
Dept: FAMILY MEDICINE CLINIC | Facility: CLINIC | Age: 40
End: 2020-08-18
Payer: COMMERCIAL

## 2020-08-18 VITALS
DIASTOLIC BLOOD PRESSURE: 80 MMHG | SYSTOLIC BLOOD PRESSURE: 122 MMHG | HEIGHT: 64 IN | RESPIRATION RATE: 16 BRPM | WEIGHT: 187.4 LBS | HEART RATE: 76 BPM | OXYGEN SATURATION: 98 % | TEMPERATURE: 97.7 F | BODY MASS INDEX: 31.99 KG/M2

## 2020-08-18 DIAGNOSIS — Z00.00 ANNUAL PHYSICAL EXAM: Primary | ICD-10-CM

## 2020-08-18 PROCEDURE — 99396 PREV VISIT EST AGE 40-64: CPT | Performed by: FAMILY MEDICINE

## 2020-08-18 PROCEDURE — 90715 TDAP VACCINE 7 YRS/> IM: CPT | Performed by: FAMILY MEDICINE

## 2020-08-18 PROCEDURE — 3008F BODY MASS INDEX DOCD: CPT | Performed by: FAMILY MEDICINE

## 2020-08-18 PROCEDURE — 3725F SCREEN DEPRESSION PERFORMED: CPT | Performed by: FAMILY MEDICINE

## 2020-08-18 PROCEDURE — 90471 IMMUNIZATION ADMIN: CPT | Performed by: FAMILY MEDICINE

## 2020-08-18 NOTE — PATIENT INSTRUCTIONS

## 2020-08-18 NOTE — PROGRESS NOTES
[address] [facility]    NAME: Kathia Etienne  AGE: 37 y o  SEX: female  : 1980     DATE: 2020     Assessment and Plan:     Problem List Items Addressed This Visit     None      Visit Diagnoses     Annual physical exam    -  Primary          Immunizations and preventive care screenings were discussed with patient today  Appropriate education was printed on patient's after visit summary  Counseling:  Alcohol/drug use: discussed moderation in alcohol intake, the recommendations for healthy alcohol use, and avoidance of illicit drug use  Dental Health: discussed importance of regular tooth brushing, flossing, and dental visits  Injury prevention: discussed safety/seat belts, safety helmets, smoke detectors, carbon dioxide detectors, and smoking near bedding or upholstery  Sexual health: discussed sexually transmitted diseases, partner selection, use of condoms, avoidance of unintended pregnancy, and contraceptive alternatives  · Exercise: the importance of regular exercise/physical activity was discussed  Recommend exercise 3-5 times per week for at least 30 minutes  BMI Counseling: Body mass index is 32 47 kg/m²  The BMI is above normal  Nutrition recommendations include decreasing portion sizes and encouraging healthy choices of fruits and vegetables  Exercise recommendations include moderate physical activity 150 minutes/week  No pharmacotherapy was ordered  No follow-ups on file  Chief Complaint:     Chief Complaint   Patient presents with    Annual Exam      History of Present Illness:     Adult Annual Physical   Patient here for a comprehensive physical exam  The patient reports no problems  Diet and Physical Activity  · Diet/Nutrition: well balanced diet and consuming 3-5 servings of fruits/vegetables daily  · Exercise: walking        Depression Screening  PHQ-9 Depression Screening    PHQ-9: Frequency of the following problems over the past two weeks:       Little interest or pleasure in doing things:  0 - not at all  Feeling down, depressed, or hopeless:  0 - not at all  PHQ-2 Score:  0       General Health  · Sleep: sleeps well and gets 7-8 hours of sleep on average  · Hearing: normal - bilateral   · Vision: most recent eye exam >1 year ago and wears contacts  · Dental: regular dental visits and brushes teeth twice daily  /GYN Health  · Patient is: premenopausal  · Last menstrual period: august 10th , 2020   · Contraceptive method: none      Review of Systems:     Review of Systems   Constitutional: Negative  HENT: Negative  Eyes: Negative  Respiratory: Negative  Cardiovascular: Negative  Gastrointestinal: Negative  Endocrine: Negative  Genitourinary: Negative  Musculoskeletal: Negative  Skin: Negative  Allergic/Immunologic: Negative  Neurological: Negative  Hematological: Negative  Psychiatric/Behavioral: Negative         Past Medical History:     Past Medical History:   Diagnosis Date    Known health problems: none     Pregnancy     resolved: 11/5/2015      Past Surgical History:     Past Surgical History:   Procedure Laterality Date    ARTHROSCOPY KNEE      BREAST SURGERY      reduction procedure    REDUCTION MAMMAPLASTY Bilateral 1999      Social History:        Social History     Socioeconomic History    Marital status: /Civil Union     Spouse name: Not on file    Number of children: Not on file    Years of education: Not on file    Highest education level: Not on file   Occupational History    Not on file   Social Needs    Financial resource strain: Not on file    Food insecurity     Worry: Not on file     Inability: Not on file   [employer] needs     Medical: Not on file     Non-medical: Not on file   Tobacco Use    Smoking status: Never Smoker    Smokeless tobacco: Never Used   Substance and Sexual Activity    Alcohol use: Yes     Frequency: Never     Drinks per session: 1 or 2     Binge frequency: Never     Comment: social    Drug use: No    Sexual activity: Yes     Partners: Male     Birth control/protection: OCP   Lifestyle    Physical activity     Days per week: Not on file     Minutes per session: Not on file    Stress: Not on file   Relationships    Social connections     Talks on phone: Not on file     Gets together: Not on file     Attends [employer] service: Not on file     Active member of club or organization: Not on file     Attends meetings of clubs or organizations: Not on file     Relationship status: Not on file    Intimate partner violence     Fear of current or ex partner: Not on file     Emotionally abused: Not on file     Physically abused: Not on file     Forced sexual activity: Not on file   Other Topics Concern    Not on file   Social History Narrative    Always uses seat belt    Exercise habits       Family History:     Family History   Problem Relation Age of Onset    Heart attack Father     Hypertension Father     Pancreatic cancer Father 61    Celiac disease Sister     Other Sister         idopathic thrombocytopenic purpura    Lupus Sister     Lung cancer Maternal Grandmother     Other Maternal Grandfather         renal failure    Breast cancer Paternal Grandmother 54      Current Medications:     No current outpatient medications on file  No current facility-administered medications for this visit  Allergies: Allergies   Allergen Reactions    Amoxicillin Hives    Other      Annotation - [id number]: paprika  blackberries    Penicillins Hives    Sulfa Antibiotics Hives      Physical Exam:     /80   Pulse 76   Temp 97 7 °F (36 5 °C)   Resp 16   Ht 5' 3 7" (1 618 m)   Wt 85 kg (187 lb 6 4 oz)   SpO2 98%   BMI 32 47 kg/m²     Physical Exam  Constitutional:       Appearance: She is well-developed  HENT:      Head: Normocephalic and atraumatic     Eyes: Pupils: Pupils are equal, round, and reactive to light  Neck:      Musculoskeletal: Normal range of motion and neck supple  Cardiovascular:      Rate and Rhythm: Normal rate and regular rhythm  Pulmonary:      Effort: Pulmonary effort is normal  No respiratory distress  Breath sounds: Normal breath sounds  No wheezing  Abdominal:      General: Bowel sounds are normal       Palpations: Abdomen is soft  Musculoskeletal: Normal range of motion  General: No swelling or deformity  Skin:     General: Skin is warm  Capillary Refill: Capillary refill takes less than 2 seconds  Neurological:      General: No focal deficit present  Mental Status: She is alert and oriented to person, place, and time     Psychiatric:         Behavior: Behavior normal           Rosalio Mahajan MD  [address]

## 2020-09-24 ENCOUNTER — TELEPHONE (OUTPATIENT)
Dept: OBGYN CLINIC | Facility: CLINIC | Age: 40
End: 2020-09-24

## 2020-09-24 ENCOUNTER — APPOINTMENT (OUTPATIENT)
Dept: LAB | Facility: CLINIC | Age: 40
End: 2020-09-24
Payer: COMMERCIAL

## 2020-09-24 DIAGNOSIS — O20.9 VAGINAL BLEEDING IN PREGNANCY, FIRST TRIMESTER: ICD-10-CM

## 2020-09-24 DIAGNOSIS — O20.9 VAGINAL BLEEDING IN PREGNANCY, FIRST TRIMESTER: Primary | ICD-10-CM

## 2020-09-24 LAB
ABO GROUP BLD: NORMAL
B-HCG SERPL-ACNC: 332 MIU/ML
BLD GP AB SCN SERPL QL: NEGATIVE
RH BLD: POSITIVE
SPECIMEN EXPIRATION DATE: NORMAL

## 2020-09-24 PROCEDURE — 86901 BLOOD TYPING SEROLOGIC RH(D): CPT

## 2020-09-24 PROCEDURE — 86850 RBC ANTIBODY SCREEN: CPT

## 2020-09-24 PROCEDURE — 36415 COLL VENOUS BLD VENIPUNCTURE: CPT

## 2020-09-24 PROCEDURE — 86900 BLOOD TYPING SEROLOGIC ABO: CPT

## 2020-09-24 PROCEDURE — 84702 CHORIONIC GONADOTROPIN TEST: CPT

## 2020-09-24 NOTE — TELEPHONE ENCOUNTER
Spoke with patient  Now passing clots  Still denies pain  Spoke with Dr Veronica Francisco  Advised bhcg today and repeat in 48 hrs  appt tomorrow for US  Advised patient if soaking a pad q 2 hrs, feeling dizzy or sob to go to ER  Patient verbalizes understanding  Order placed for bhcg and T&S

## 2020-09-24 NOTE — TELEPHONE ENCOUNTER
Can try and move US up - may not see much at this gestational age, but if opening sooner okay to move

## 2020-09-24 NOTE — TELEPHONE ENCOUNTER
Patient called back reporting that bleeding increased when wiping, now red  Denies pain  Bleeding does not require her to use a pad  T&S on file on 8/15 B positive  Patient is tearful  Dating US scheduled for 10/6  Routing to on call provider to advise

## 2020-09-24 NOTE — TELEPHONE ENCOUNTER
Pt is bleeding lightly, only when wiping, she said this happened at her last pregnancy when she was about 6 weeks, had intercourse 2 days ago, her next apt is in 2 weeks  Per tt on call dr SPAULDING, monitor for now if bleeding gets heavier or pain call back

## 2020-09-24 NOTE — TELEPHONE ENCOUNTER
Pt is bleeding lightly, only when wiping, she said this happened at her last pregnancy when she was about 6 weeks, had intercourse 2 days ago, her next apt is in 2 weeks

## 2020-09-24 NOTE — ADDENDUM NOTE
Addended by: Ashtyn Knott on: 9/24/2020 02:26 PM     Modules accepted: Orders Bedside and Verbal shift change report given to Mary Diaz RN (oncoming nurse) by Prabhakar Tucker RN (offgoing nurse). Report included the following information SBAR, Kardex, ED Summary, Intake/Output, MAR and Recent Results.

## 2020-09-25 ENCOUNTER — OFFICE VISIT (OUTPATIENT)
Dept: OBGYN CLINIC | Facility: CLINIC | Age: 40
End: 2020-09-25
Payer: COMMERCIAL

## 2020-09-25 VITALS
DIASTOLIC BLOOD PRESSURE: 94 MMHG | BODY MASS INDEX: 32.61 KG/M2 | SYSTOLIC BLOOD PRESSURE: 144 MMHG | TEMPERATURE: 98.5 F | WEIGHT: 188.2 LBS

## 2020-09-25 DIAGNOSIS — O36.80X0 PREGNANCY, LOCATION UNKNOWN: Primary | ICD-10-CM

## 2020-09-25 PROCEDURE — 99214 OFFICE O/P EST MOD 30 MIN: CPT | Performed by: STUDENT IN AN ORGANIZED HEALTH CARE EDUCATION/TRAINING PROGRAM

## 2020-09-25 PROCEDURE — 1036F TOBACCO NON-USER: CPT | Performed by: STUDENT IN AN ORGANIZED HEALTH CARE EDUCATION/TRAINING PROGRAM

## 2020-09-25 NOTE — PROGRESS NOTES
Assessment/Plan:     36 yo  with likely SAB  No pregnancy noted in the uterus on US  hcg 323  Discussed that her story is very consistent with miscarriage, however, cannot rule out ectopic without a second hcg  She will complete this tomorrow  We will f/u by phone on Monday but aware she can call triage over the weekend with any concerns  Subjective:     Patient ID: Piter Goldberg is a 37 y o  female  36 yo  at 6 w 4 d by LMP presents due to concern for SAB  She started bleeding yesterday and has passed several clots  She continues bleeding today  She was sent for hcg yesterday which was 323 and she is Rh positive  She is not having pain  She is tearful  Reports she and her partner tried several years to get pregnant and then had given up so this was a surprise but very welcome pregnancy  Review of Systems   All other systems reviewed and are negative  Objective:     Physical Exam  Exam conducted with a chaperone present  Constitutional:       Appearance: Normal appearance  Pulmonary:      Effort: Pulmonary effort is normal    Genitourinary:     Labia:         Right: No rash  Left: No rash  Neurological:      Mental Status: She is oriented to person, place, and time  Psychiatric:      Comments: tearful         Transvaginal US shows thickened endometrium of 1 06 cm  No gestational sac visualized

## 2020-09-26 ENCOUNTER — APPOINTMENT (OUTPATIENT)
Dept: LAB | Facility: CLINIC | Age: 40
End: 2020-09-26
Payer: COMMERCIAL

## 2020-09-26 DIAGNOSIS — O36.80X0 PREGNANCY, LOCATION UNKNOWN: ICD-10-CM

## 2020-09-26 LAB — B-HCG SERPL-ACNC: 53 MIU/ML

## 2020-09-26 PROCEDURE — 36415 COLL VENOUS BLD VENIPUNCTURE: CPT

## 2020-09-26 PROCEDURE — 84702 CHORIONIC GONADOTROPIN TEST: CPT

## 2020-09-28 ENCOUNTER — TELEPHONE (OUTPATIENT)
Dept: GASTROENTEROLOGY | Facility: AMBULARY SURGERY CENTER | Age: 40
End: 2020-09-28

## 2020-09-28 ENCOUNTER — TELEPHONE (OUTPATIENT)
Dept: OBGYN CLINIC | Facility: CLINIC | Age: 40
End: 2020-09-28

## 2020-09-28 NOTE — TELEPHONE ENCOUNTER
----- Message from Katlin Cohn MD sent at 9/28/2020  9:05 AM EDT -----  Please let Susy Hurley know that her hcg trended down as we would expect with a miscarriage  She should take a pregnancy test at home in a week and we would expect it to be negative  If positive, please have her call us  Please offer her a f/u appt if she wants to discuss conceiving in the future  Thanks!

## 2020-09-28 NOTE — TELEPHONE ENCOUNTER
Pt contacted and advised as directed  Pt agreed to plan of action but declined appt at this point in time due to wanted to see only CS but no appts available in [facility] [place] and [place] office is too far  Pt declined another provider and stated she will discussion with partner and call back if decided she would still like to schedule a discussion for the future

## 2020-11-06 ENCOUNTER — IMMUNIZATIONS (OUTPATIENT)
Dept: FAMILY MEDICINE CLINIC | Facility: CLINIC | Age: 40
End: 2020-11-06
Payer: COMMERCIAL

## 2020-11-06 DIAGNOSIS — Z23 ENCOUNTER FOR IMMUNIZATION: ICD-10-CM

## 2020-11-06 PROCEDURE — 90686 IIV4 VACC NO PRSV 0.5 ML IM: CPT

## 2020-11-06 PROCEDURE — 90471 IMMUNIZATION ADMIN: CPT

## 2020-12-04 DIAGNOSIS — F41.9 ANXIETY: Primary | ICD-10-CM

## 2020-12-04 RX ORDER — ALPRAZOLAM 0.25 MG/1
0.25 TABLET ORAL
Qty: 10 TABLET | Refills: 0 | Status: SHIPPED | OUTPATIENT
Start: 2020-12-04 | End: 2021-02-11 | Stop reason: SDUPTHER

## 2020-12-31 ENCOUNTER — HOSPITAL ENCOUNTER (OUTPATIENT)
Dept: MAMMOGRAPHY | Facility: HOSPITAL | Age: 40
Discharge: HOME | End: 2020-12-31
Payer: COMMERCIAL

## 2020-12-31 VITALS — WEIGHT: 188 LBS | BODY MASS INDEX: 32.1 KG/M2 | HEIGHT: 64 IN

## 2020-12-31 DIAGNOSIS — Z12.31 VISIT FOR SCREENING MAMMOGRAM: ICD-10-CM

## 2020-12-31 PROCEDURE — 77063 BREAST TOMOSYNTHESIS BI: CPT

## 2020-12-31 PROCEDURE — 77067 SCR MAMMO BI INCL CAD: CPT

## 2021-01-14 ENCOUNTER — OFFICE VISIT (OUTPATIENT)
Dept: FAMILY MEDICINE CLINIC | Facility: CLINIC | Age: 41
End: 2021-01-14
Payer: COMMERCIAL

## 2021-01-14 VITALS
TEMPERATURE: 98.8 F | DIASTOLIC BLOOD PRESSURE: 84 MMHG | BODY MASS INDEX: 35.12 KG/M2 | SYSTOLIC BLOOD PRESSURE: 138 MMHG | OXYGEN SATURATION: 98 % | HEART RATE: 82 BPM | HEIGHT: 63 IN | WEIGHT: 198.2 LBS

## 2021-01-14 DIAGNOSIS — K21.9 GASTROESOPHAGEAL REFLUX DISEASE, UNSPECIFIED WHETHER ESOPHAGITIS PRESENT: ICD-10-CM

## 2021-01-14 DIAGNOSIS — R42 DIZZINESS: Primary | ICD-10-CM

## 2021-01-14 DIAGNOSIS — F41.9 ANXIETY: ICD-10-CM

## 2021-01-14 DIAGNOSIS — J45.21: ICD-10-CM

## 2021-01-14 PROCEDURE — 93000 ELECTROCARDIOGRAM COMPLETE: CPT | Performed by: FAMILY MEDICINE

## 2021-01-14 PROCEDURE — 99214 OFFICE O/P EST MOD 30 MIN: CPT | Performed by: FAMILY MEDICINE

## 2021-01-14 PROCEDURE — 1036F TOBACCO NON-USER: CPT | Performed by: FAMILY MEDICINE

## 2021-01-14 PROCEDURE — 3008F BODY MASS INDEX DOCD: CPT | Performed by: FAMILY MEDICINE

## 2021-01-14 RX ORDER — MECLIZINE HYDROCHLORIDE 25 MG/1
25 TABLET ORAL EVERY 8 HOURS PRN
Qty: 30 TABLET | Refills: 0 | Status: SHIPPED | OUTPATIENT
Start: 2021-01-14 | End: 2021-02-11 | Stop reason: ALTCHOICE

## 2021-01-14 NOTE — PROGRESS NOTES
Assessment/Plan:    No problem-specific Assessment & Plan notes found for this encounter  Diagnoses and all orders for this visit:    Dizziness  Comments:  normal EKG done today in the office   BPPV vs  anxiety related  trial of meclizine   Orders:  -     POCT ECG  -     Comprehensive metabolic panel  -     CBC and differential  -     TSH, 3rd generation with Free T4 reflex; Future  -     Iron; Future  -     meclizine (ANTIVERT) 25 mg tablet; Take 1 tablet (25 mg total) by mouth every 8 (eight) hours as needed for dizziness    Anxiety  Comments:  counseling done today   trial of zoloft   to recheck in 6 weeks   Orders:  -     sertraline (ZOLOFT) 50 mg tablet; Take 1 tablet (50 mg total) by mouth daily    Mild intermittent occasional asthma with acute exacerbation  Comments:  no recent issues   continue to monitor     Gastroesophageal reflux disease, unspecified whether esophagitis present  Comments:  stable  avoid triggers          Subjective:      Patient ID: Griselda Park is a 41 y o  female  Feels dizzy almost everyday for the last 6 months   Feels like room is spinning , nausea with chest feels heavy with it  Overwhelmed , taking on so much with her house, 11years old daughter, she is in charge at her work  + heavy periods     Dizziness  This is a new problem  The current episode started more than 1 year ago  The problem occurs intermittently  The problem has been waxing and waning  Associated symptoms include a change in bowel habit, nausea, numbness, vertigo, a visual change and weakness  Pertinent negatives include no abdominal pain, anorexia, arthralgias, chest pain, chills, congestion, coughing, diaphoresis, fatigue, fever, headaches, joint swelling, myalgias, neck pain, rash, sore throat, swollen glands, urinary symptoms or vomiting  Exacerbated by: stress  She has tried rest for the symptoms       The following portions of the patient's history were reviewed and updated as appropriate: allergies, current medications, past family history, past medical history, past social history, past surgical history and problem list     Review of Systems   Constitutional: Negative for chills, diaphoresis, fatigue and fever  HENT: Negative for congestion and sore throat  Respiratory: Negative for cough  Cardiovascular: Negative for chest pain  Gastrointestinal: Positive for change in bowel habit and nausea  Negative for abdominal pain, anorexia and vomiting  Musculoskeletal: Negative for arthralgias, joint swelling, myalgias and neck pain  Skin: Negative for rash  Neurological: Positive for dizziness, vertigo, weakness and numbness  Negative for headaches  Objective:      /84 (BP Location: Left arm, Patient Position: Sitting, Cuff Size: Adult)   Pulse 82   Temp 98 8 °F (37 1 °C) (Tympanic)   Ht 5' 3 47" (1 612 m)   Wt 89 9 kg (198 lb 3 2 oz)   LMP 12/20/2020 (Approximate)   SpO2 98%   BMI 34 60 kg/m²          Physical Exam  Constitutional:       Appearance: Normal appearance  Eyes:      Extraocular Movements: Extraocular movements intact  Pupils: Pupils are equal, round, and reactive to light  Cardiovascular:      Pulses: Normal pulses  Heart sounds: Normal heart sounds  Pulmonary:      Effort: Pulmonary effort is normal       Breath sounds: Normal breath sounds  Neurological:      General: No focal deficit present  Mental Status: She is alert and oriented to person, place, and time  Psychiatric:         Mood and Affect: Mood is anxious and depressed  Affect is tearful

## 2021-01-19 DIAGNOSIS — F41.9 ANXIETY: Primary | ICD-10-CM

## 2021-01-19 RX ORDER — FLUOXETINE 10 MG/1
10 CAPSULE ORAL DAILY
Qty: 30 CAPSULE | Refills: 0 | Status: SHIPPED | OUTPATIENT
Start: 2021-01-19 | End: 2021-02-08

## 2021-01-22 ENCOUNTER — APPOINTMENT (OUTPATIENT)
Dept: LAB | Facility: CLINIC | Age: 41
End: 2021-01-22
Payer: COMMERCIAL

## 2021-01-22 DIAGNOSIS — R42 DIZZINESS: ICD-10-CM

## 2021-01-22 LAB
ALBUMIN SERPL BCP-MCNC: 3.9 G/DL (ref 3.5–5)
ALP SERPL-CCNC: 65 U/L (ref 46–116)
ALT SERPL W P-5'-P-CCNC: 33 U/L (ref 12–78)
ANION GAP SERPL CALCULATED.3IONS-SCNC: 8 MMOL/L (ref 4–13)
AST SERPL W P-5'-P-CCNC: 17 U/L (ref 5–45)
BASOPHILS # BLD AUTO: 0.02 THOUSANDS/ΜL (ref 0–0.1)
BASOPHILS NFR BLD AUTO: 0 % (ref 0–1)
BILIRUB SERPL-MCNC: 0.23 MG/DL (ref 0.2–1)
BUN SERPL-MCNC: 18 MG/DL (ref 5–25)
CALCIUM SERPL-MCNC: 8.5 MG/DL (ref 8.3–10.1)
CHLORIDE SERPL-SCNC: 104 MMOL/L (ref 100–108)
CO2 SERPL-SCNC: 26 MMOL/L (ref 21–32)
CREAT SERPL-MCNC: 0.84 MG/DL (ref 0.6–1.3)
EOSINOPHIL # BLD AUTO: 0.07 THOUSAND/ΜL (ref 0–0.61)
EOSINOPHIL NFR BLD AUTO: 1 % (ref 0–6)
ERYTHROCYTE [DISTWIDTH] IN BLOOD BY AUTOMATED COUNT: 12.4 % (ref 11.6–15.1)
GFR SERPL CREATININE-BSD FRML MDRD: 86 ML/MIN/1.73SQ M
GLUCOSE SERPL-MCNC: 101 MG/DL (ref 65–140)
HCT VFR BLD AUTO: 39.8 % (ref 34.8–46.1)
HGB BLD-MCNC: 13.1 G/DL (ref 11.5–15.4)
IMM GRANULOCYTES # BLD AUTO: 0.01 THOUSAND/UL (ref 0–0.2)
IMM GRANULOCYTES NFR BLD AUTO: 0 % (ref 0–2)
IRON SERPL-MCNC: 56 UG/DL (ref 50–170)
LYMPHOCYTES # BLD AUTO: 3.57 THOUSANDS/ΜL (ref 0.6–4.47)
LYMPHOCYTES NFR BLD AUTO: 50 % (ref 14–44)
MCH RBC QN AUTO: 29.1 PG (ref 26.8–34.3)
MCHC RBC AUTO-ENTMCNC: 32.9 G/DL (ref 31.4–37.4)
MCV RBC AUTO: 88 FL (ref 82–98)
MONOCYTES # BLD AUTO: 0.44 THOUSAND/ΜL (ref 0.17–1.22)
MONOCYTES NFR BLD AUTO: 6 % (ref 4–12)
NEUTROPHILS # BLD AUTO: 3.12 THOUSANDS/ΜL (ref 1.85–7.62)
NEUTS SEG NFR BLD AUTO: 43 % (ref 43–75)
NRBC BLD AUTO-RTO: 0 /100 WBCS
PLATELET # BLD AUTO: 386 THOUSANDS/UL (ref 149–390)
PMV BLD AUTO: 9.2 FL (ref 8.9–12.7)
POTASSIUM SERPL-SCNC: 3.6 MMOL/L (ref 3.5–5.3)
PROT SERPL-MCNC: 7.2 G/DL (ref 6.4–8.2)
RBC # BLD AUTO: 4.5 MILLION/UL (ref 3.81–5.12)
SODIUM SERPL-SCNC: 138 MMOL/L (ref 136–145)
TSH SERPL DL<=0.05 MIU/L-ACNC: 0.71 UIU/ML (ref 0.36–3.74)
WBC # BLD AUTO: 7.23 THOUSAND/UL (ref 4.31–10.16)

## 2021-01-22 PROCEDURE — 80053 COMPREHEN METABOLIC PANEL: CPT | Performed by: FAMILY MEDICINE

## 2021-01-22 PROCEDURE — 85025 COMPLETE CBC W/AUTO DIFF WBC: CPT | Performed by: FAMILY MEDICINE

## 2021-01-22 PROCEDURE — 83540 ASSAY OF IRON: CPT

## 2021-01-22 PROCEDURE — 84443 ASSAY THYROID STIM HORMONE: CPT

## 2021-01-22 PROCEDURE — 36415 COLL VENOUS BLD VENIPUNCTURE: CPT | Performed by: FAMILY MEDICINE

## 2021-02-05 DIAGNOSIS — F41.9 ANXIETY: ICD-10-CM

## 2021-02-08 RX ORDER — FLUOXETINE 10 MG/1
CAPSULE ORAL
Qty: 90 CAPSULE | Refills: 1 | Status: SHIPPED | OUTPATIENT
Start: 2021-02-08 | End: 2021-08-05

## 2021-02-11 ENCOUNTER — TELEPHONE (OUTPATIENT)
Dept: OBGYN CLINIC | Facility: CLINIC | Age: 41
End: 2021-02-11

## 2021-02-11 ENCOUNTER — OFFICE VISIT (OUTPATIENT)
Dept: FAMILY MEDICINE CLINIC | Facility: CLINIC | Age: 41
End: 2021-02-11
Payer: COMMERCIAL

## 2021-02-11 ENCOUNTER — TELEPHONE (OUTPATIENT)
Dept: FAMILY MEDICINE CLINIC | Facility: CLINIC | Age: 41
End: 2021-02-11

## 2021-02-11 VITALS
HEART RATE: 85 BPM | TEMPERATURE: 99 F | BODY MASS INDEX: 33.95 KG/M2 | SYSTOLIC BLOOD PRESSURE: 138 MMHG | DIASTOLIC BLOOD PRESSURE: 78 MMHG | WEIGHT: 191.6 LBS | OXYGEN SATURATION: 98 % | HEIGHT: 63 IN

## 2021-02-11 DIAGNOSIS — F41.9 ANXIETY: ICD-10-CM

## 2021-02-11 DIAGNOSIS — R10.9 LEFT SIDED ABDOMINAL PAIN: Primary | ICD-10-CM

## 2021-02-11 LAB
SL AMB  POCT GLUCOSE, UA: ABNORMAL
SL AMB LEUKOCYTE ESTERASE,UA: ABNORMAL
SL AMB POCT BILIRUBIN,UA: ABNORMAL
SL AMB POCT BLOOD,UA: ABNORMAL
SL AMB POCT CLARITY,UA: CLEAR
SL AMB POCT COLOR,UA: YELLOW
SL AMB POCT KETONES,UA: ABNORMAL
SL AMB POCT NITRITE,UA: ABNORMAL
SL AMB POCT PH,UA: 6
SL AMB POCT SPECIFIC GRAVITY,UA: 1.02
SL AMB POCT URINE PROTEIN: ABNORMAL
SL AMB POCT UROBILINOGEN: ABNORMAL

## 2021-02-11 PROCEDURE — 81002 URINALYSIS NONAUTO W/O SCOPE: CPT | Performed by: FAMILY MEDICINE

## 2021-02-11 PROCEDURE — 99214 OFFICE O/P EST MOD 30 MIN: CPT | Performed by: FAMILY MEDICINE

## 2021-02-11 RX ORDER — OMEPRAZOLE 20 MG/1
20 CAPSULE, DELAYED RELEASE ORAL DAILY
Qty: 30 CAPSULE | Refills: 0 | Status: SHIPPED | OUTPATIENT
Start: 2021-02-11 | End: 2021-02-26 | Stop reason: ALTCHOICE

## 2021-02-11 RX ORDER — ALPRAZOLAM 0.25 MG/1
0.25 TABLET ORAL
Qty: 10 TABLET | Refills: 0 | Status: SHIPPED | OUTPATIENT
Start: 2021-02-11 | End: 2021-07-15 | Stop reason: ALTCHOICE

## 2021-02-11 RX ORDER — SUCRALFATE ORAL 1 G/10ML
1 SUSPENSION ORAL
Qty: 420 ML | Refills: 0 | Status: SHIPPED | OUTPATIENT
Start: 2021-02-11 | End: 2021-02-26 | Stop reason: ALTCHOICE

## 2021-02-11 NOTE — TELEPHONE ENCOUNTER
Pt has a dull pain in the upper left quadrant of the abdomen that started on 2/9/2021  It initially was sharp pain but has gotten dull over time  Patient asking how to treat  Pt scheduled for an appt on 2/26  Advised pt to go to ER if pain continues or worsens

## 2021-02-11 NOTE — TELEPHONE ENCOUNTER
Pt advised to go to ER and have the pain checked out ASAP, as per Dr Sheeba Lennon  Pt confirmed and will be going today

## 2021-02-11 NOTE — TELEPHONE ENCOUNTER
----- Message from Cris Hemphill sent at 2/10/2021  8:45 PM EST -----  Regarding: Non-Urgent Medical Question  Contact: [phone]  Recently I have noticed an increase in anxiety around my period  It has gotten worse each month  Is there something I can do to alleviate this

## 2021-02-19 DIAGNOSIS — Z23 ENCOUNTER FOR IMMUNIZATION: ICD-10-CM

## 2021-02-26 ENCOUNTER — OFFICE VISIT (OUTPATIENT)
Dept: FAMILY MEDICINE CLINIC | Facility: CLINIC | Age: 41
End: 2021-02-26
Payer: COMMERCIAL

## 2021-02-26 VITALS
WEIGHT: 193.2 LBS | DIASTOLIC BLOOD PRESSURE: 74 MMHG | HEART RATE: 92 BPM | OXYGEN SATURATION: 97 % | SYSTOLIC BLOOD PRESSURE: 130 MMHG | HEIGHT: 64 IN | BODY MASS INDEX: 32.98 KG/M2 | TEMPERATURE: 98.6 F

## 2021-02-26 DIAGNOSIS — F41.9 ANXIETY: Primary | ICD-10-CM

## 2021-02-26 DIAGNOSIS — K21.9 GASTROESOPHAGEAL REFLUX DISEASE, UNSPECIFIED WHETHER ESOPHAGITIS PRESENT: ICD-10-CM

## 2021-02-26 DIAGNOSIS — K76.0 HEPATIC STEATOSIS: ICD-10-CM

## 2021-02-26 PROCEDURE — 3725F SCREEN DEPRESSION PERFORMED: CPT | Performed by: FAMILY MEDICINE

## 2021-02-26 PROCEDURE — 1036F TOBACCO NON-USER: CPT | Performed by: FAMILY MEDICINE

## 2021-02-26 PROCEDURE — 99214 OFFICE O/P EST MOD 30 MIN: CPT | Performed by: FAMILY MEDICINE

## 2021-02-26 PROCEDURE — 3008F BODY MASS INDEX DOCD: CPT | Performed by: FAMILY MEDICINE

## 2021-02-26 NOTE — PROGRESS NOTES
Assessment/Plan:    No problem-specific Assessment & Plan notes found for this encounter  Diagnoses and all orders for this visit:    Anxiety  Comments:  she doesnt want meds for now   she will think about prozac     Gastroesophageal reflux disease, unspecified whether esophagitis present  Comments:  stable   continue to monitor  avoid triggers    Hepatic steatosis  Comments:  weight loss discussed today   to send me a food diary to go over      BMI Counseling: Body mass index is 33 56 kg/m²  The BMI is above normal  Nutrition recommendations include decreasing portion sizes and encouraging healthy choices of fruits and vegetables  Exercise recommendations include moderate physical activity 150 minutes/week  No pharmacotherapy was ordered  Subjective:      Patient ID: Carlitos Mercer is a 41 y o  female  Here for follow up  + c/o dry skin for the last few months   Didn't do her H pylori infection panel   Having hard time losing weight despite of she is eating healthy  Stomach pain has been better but still having bloating   Didn't try her prozac yet since she is afraid of side effects  Still feeling anxious at times but not as bad    Anxiety  Presents for follow-up visit  Symptoms include excessive worry  Patient reports no chest pain, compulsions, confusion, decreased concentration, depressed mood, dizziness, dry mouth, feeling of choking, hyperventilation, impotence, insomnia, irritability, malaise, muscle tension, nausea, nervous/anxious behavior, obsessions, palpitations, panic, restlessness, shortness of breath or suicidal ideas  Symptoms occur occasionally  The quality of sleep is good  Compliance with medications is %         The following portions of the patient's history were reviewed and updated as appropriate: allergies, current medications, past family history, past medical history, past social history, past surgical history and problem list     Review of Systems Constitutional: Negative for irritability  Respiratory: Negative for shortness of breath  Cardiovascular: Negative for chest pain and palpitations  Gastrointestinal: Negative for nausea  Genitourinary: Negative for impotence  Neurological: Negative for dizziness  Psychiatric/Behavioral: Negative for confusion, decreased concentration and suicidal ideas  The patient is not nervous/anxious and does not have insomnia  Objective:      /74 (BP Location: Left arm, Patient Position: Sitting, Cuff Size: Adult)   Pulse 92   Temp 98 6 °F (37 °C) (Tympanic)   Ht 5' 3 62" (1 616 m)   Wt 87 6 kg (193 lb 3 2 oz)   SpO2 97%   BMI 33 56 kg/m²          Physical Exam  Constitutional:       Appearance: Normal appearance  HENT:      Mouth/Throat:      Mouth: Mucous membranes are moist    Cardiovascular:      Pulses: Normal pulses  Heart sounds: Normal heart sounds  Pulmonary:      Effort: Pulmonary effort is normal       Breath sounds: Normal breath sounds  Abdominal:      General: Abdomen is flat  Neurological:      General: No focal deficit present  Mental Status: She is alert and oriented to person, place, and time     Psychiatric:         Mood and Affect: Mood normal          Behavior: Behavior normal

## 2021-04-01 PROCEDURE — 88305 TISSUE EXAM BY PATHOLOGIST: CPT | Performed by: STUDENT IN AN ORGANIZED HEALTH CARE EDUCATION/TRAINING PROGRAM

## 2021-04-02 ENCOUNTER — LAB REQUISITION (OUTPATIENT)
Dept: LAB | Facility: HOSPITAL | Age: 41
End: 2021-04-02
Payer: COMMERCIAL

## 2021-04-02 DIAGNOSIS — C44.41 BASAL CELL CARCINOMA OF SKIN OF SCALP AND NECK: ICD-10-CM

## 2021-04-30 ENCOUNTER — HOSPITAL ENCOUNTER (EMERGENCY)
Facility: HOSPITAL | Age: 41
Discharge: HOME | End: 2021-04-30
Attending: EMERGENCY MEDICINE
Payer: COMMERCIAL

## 2021-04-30 VITALS
DIASTOLIC BLOOD PRESSURE: 93 MMHG | WEIGHT: 190.04 LBS | HEART RATE: 82 BPM | SYSTOLIC BLOOD PRESSURE: 163 MMHG | OXYGEN SATURATION: 98 % | TEMPERATURE: 98.3 F | RESPIRATION RATE: 18 BRPM | BODY MASS INDEX: 33.01 KG/M2

## 2021-04-30 DIAGNOSIS — K21.9 GERD (GASTROESOPHAGEAL REFLUX DISEASE): ICD-10-CM

## 2021-04-30 DIAGNOSIS — R10.13 EPIGASTRIC ABDOMINAL PAIN: Primary | ICD-10-CM

## 2021-04-30 LAB
ALBUMIN SERPL BCP-MCNC: 4 G/DL (ref 3.5–5)
ALP SERPL-CCNC: 62 U/L (ref 46–116)
ALT SERPL W P-5'-P-CCNC: 37 U/L (ref 12–78)
ANION GAP SERPL CALCULATED.3IONS-SCNC: 11 MMOL/L (ref 4–13)
AST SERPL W P-5'-P-CCNC: 19 U/L (ref 5–45)
BACTERIA UR QL AUTO: NORMAL /HPF
BASOPHILS # BLD AUTO: 0.03 THOUSANDS/ΜL (ref 0–0.1)
BASOPHILS NFR BLD AUTO: 0 % (ref 0–1)
BILIRUB SERPL-MCNC: 0.31 MG/DL (ref 0.2–1)
BILIRUB UR QL STRIP: NEGATIVE
BUN SERPL-MCNC: 16 MG/DL (ref 5–25)
CALCIUM SERPL-MCNC: 9.2 MG/DL (ref 8.3–10.1)
CHLORIDE SERPL-SCNC: 103 MMOL/L (ref 100–108)
CLARITY UR: CLEAR
CO2 SERPL-SCNC: 27 MMOL/L (ref 21–32)
COLOR UR: YELLOW
CREAT SERPL-MCNC: 0.92 MG/DL (ref 0.6–1.3)
EOSINOPHIL # BLD AUTO: 0.05 THOUSAND/ΜL (ref 0–0.61)
EOSINOPHIL NFR BLD AUTO: 1 % (ref 0–6)
ERYTHROCYTE [DISTWIDTH] IN BLOOD BY AUTOMATED COUNT: 12.2 % (ref 11.6–15.1)
EXT PREG TEST URINE: NEGATIVE
EXT. CONTROL ED NAV: NORMAL
GFR SERPL CREATININE-BSD FRML MDRD: 77 ML/MIN/1.73SQ M
GLUCOSE SERPL-MCNC: 100 MG/DL (ref 65–140)
GLUCOSE UR STRIP-MCNC: NEGATIVE MG/DL
HCT VFR BLD AUTO: 41.7 % (ref 34.8–46.1)
HGB BLD-MCNC: 14 G/DL (ref 11.5–15.4)
HGB UR QL STRIP.AUTO: ABNORMAL
IMM GRANULOCYTES # BLD AUTO: 0.02 THOUSAND/UL (ref 0–0.2)
IMM GRANULOCYTES NFR BLD AUTO: 0 % (ref 0–2)
KETONES UR STRIP-MCNC: NEGATIVE MG/DL
LEUKOCYTE ESTERASE UR QL STRIP: NEGATIVE
LIPASE SERPL-CCNC: 73 U/L (ref 73–393)
LYMPHOCYTES # BLD AUTO: 3.28 THOUSANDS/ΜL (ref 0.6–4.47)
LYMPHOCYTES NFR BLD AUTO: 37 % (ref 14–44)
MCH RBC QN AUTO: 29.4 PG (ref 26.8–34.3)
MCHC RBC AUTO-ENTMCNC: 33.6 G/DL (ref 31.4–37.4)
MCV RBC AUTO: 87 FL (ref 82–98)
MONOCYTES # BLD AUTO: 0.64 THOUSAND/ΜL (ref 0.17–1.22)
MONOCYTES NFR BLD AUTO: 7 % (ref 4–12)
NEUTROPHILS # BLD AUTO: 4.83 THOUSANDS/ΜL (ref 1.85–7.62)
NEUTS SEG NFR BLD AUTO: 55 % (ref 43–75)
NITRITE UR QL STRIP: NEGATIVE
NON-SQ EPI CELLS URNS QL MICRO: NORMAL /HPF
NRBC BLD AUTO-RTO: 0 /100 WBCS
PH UR STRIP.AUTO: 7 [PH] (ref 4.5–8)
PLATELET # BLD AUTO: 347 THOUSANDS/UL (ref 149–390)
PMV BLD AUTO: 8.8 FL (ref 8.9–12.7)
POTASSIUM SERPL-SCNC: 3.7 MMOL/L (ref 3.5–5.3)
PROT SERPL-MCNC: 7.6 G/DL (ref 6.4–8.2)
PROT UR STRIP-MCNC: NEGATIVE MG/DL
RBC # BLD AUTO: 4.77 MILLION/UL (ref 3.81–5.12)
RBC #/AREA URNS AUTO: NORMAL /HPF
SODIUM SERPL-SCNC: 141 MMOL/L (ref 136–145)
SP GR UR STRIP.AUTO: >=1.03 (ref 1–1.03)
TROPONIN I SERPL-MCNC: <0.02 NG/ML
UROBILINOGEN UR QL STRIP.AUTO: 0.2 E.U./DL
WBC # BLD AUTO: 8.85 THOUSAND/UL (ref 4.31–10.16)
WBC #/AREA URNS AUTO: NORMAL /HPF

## 2021-04-30 PROCEDURE — 85025 COMPLETE CBC W/AUTO DIFF WBC: CPT | Performed by: EMERGENCY MEDICINE

## 2021-04-30 PROCEDURE — 99284 EMERGENCY DEPT VISIT MOD MDM: CPT

## 2021-04-30 PROCEDURE — 36415 COLL VENOUS BLD VENIPUNCTURE: CPT

## 2021-04-30 PROCEDURE — 81001 URINALYSIS AUTO W/SCOPE: CPT

## 2021-04-30 PROCEDURE — 99284 EMERGENCY DEPT VISIT MOD MDM: CPT | Performed by: PHYSICIAN ASSISTANT

## 2021-04-30 PROCEDURE — 81025 URINE PREGNANCY TEST: CPT | Performed by: PHYSICIAN ASSISTANT

## 2021-04-30 PROCEDURE — 93005 ELECTROCARDIOGRAM TRACING: CPT

## 2021-04-30 PROCEDURE — 83690 ASSAY OF LIPASE: CPT | Performed by: EMERGENCY MEDICINE

## 2021-04-30 PROCEDURE — 80053 COMPREHEN METABOLIC PANEL: CPT | Performed by: EMERGENCY MEDICINE

## 2021-04-30 PROCEDURE — 84484 ASSAY OF TROPONIN QUANT: CPT | Performed by: PHYSICIAN ASSISTANT

## 2021-04-30 RX ORDER — MAGNESIUM HYDROXIDE/ALUMINUM HYDROXICE/SIMETHICONE 120; 1200; 1200 MG/30ML; MG/30ML; MG/30ML
30 SUSPENSION ORAL ONCE
Status: COMPLETED | OUTPATIENT
Start: 2021-04-30 | End: 2021-04-30

## 2021-04-30 RX ORDER — LIDOCAINE HYDROCHLORIDE 20 MG/ML
15 SOLUTION OROPHARYNGEAL ONCE
Status: COMPLETED | OUTPATIENT
Start: 2021-04-30 | End: 2021-04-30

## 2021-04-30 RX ORDER — PANTOPRAZOLE SODIUM 40 MG/1
40 TABLET, DELAYED RELEASE ORAL ONCE
Status: COMPLETED | OUTPATIENT
Start: 2021-04-30 | End: 2021-04-30

## 2021-04-30 RX ORDER — OMEPRAZOLE 20 MG/1
20 CAPSULE, DELAYED RELEASE ORAL 2 TIMES DAILY
Qty: 28 CAPSULE | Refills: 0 | Status: SHIPPED | OUTPATIENT
Start: 2021-04-30 | End: 2021-05-06 | Stop reason: SDUPTHER

## 2021-04-30 RX ADMIN — LIDOCAINE HYDROCHLORIDE 15 ML: 20 SOLUTION ORAL; TOPICAL at 13:48

## 2021-04-30 RX ADMIN — ALUMINA, MAGNESIA, AND SIMETHICONE ORAL SUSPENSION REGULAR STRENGTH 30 ML: 1200; 1200; 120 SUSPENSION ORAL at 13:48

## 2021-04-30 RX ADMIN — PANTOPRAZOLE SODIUM 40 MG: 40 TABLET, DELAYED RELEASE ORAL at 13:48

## 2021-04-30 NOTE — ED PROVIDER NOTES
History  Chief Complaint   Patient presents with    Abdominal Pain     central upper abdominal pain starting last night after eating     Patient presents emergency department for epigastric abdominal pain since last night  Patient states she has a little bit nauseous last night but she has been able to eat and drink normally today and that did not change the belly pain  No cough congestion or fevers  Patient states she had couple episodes of loose stool several days ago but since Mom with has not had any diarrhea  When she does not feel constipated  No history of diabetes or hypertension  Does have remote history of hypercholesterol will but hold off on testing done in several years  Patient states that she was to have an endoscopy and colonoscopy and then COVID happened and so she did not have the testing done  She states she has had ongoing GI issues and is actually scheduled to see GI next week  However this pain starting last night and was really bothering her today and she states she had a hard time sleeping last night so she came in for evaluation  Prior to Admission Medications   Prescriptions Last Dose Informant Patient Reported? Taking?    ALPRAZolam (XANAX) 0 25 mg tablet   No No   Sig: Take 1 tablet (0 25 mg total) by mouth daily at bedtime as needed for anxiety   Patient not taking: Reported on 3/29/2021   Cetirizine HCl (ZYRTEC ALLERGY PO)   Yes No   Sig: Take by mouth   FLUoxetine (PROzac) 10 mg capsule   No No   Sig: TAKE 1 CAPSULE BY MOUTH EVERY DAY   diazepam (VALIUM) 2 mg tablet   No No   Sig: Take 1 tablet (2 mg total) by mouth every 6 (six) hours as needed (vertigo)      Facility-Administered Medications: None       Past Medical History:   Diagnosis Date    Asthma     Known health problems: none     Pregnancy     resolved: 11/5/2015       Past Surgical History:   Procedure Laterality Date    ARTHROSCOPY KNEE      BREAST SURGERY      reduction procedure    REDUCTION MAMMAPLASTY Bilateral 1999       Family History   Problem Relation Age of Onset    Heart attack Father     Hypertension Father     Pancreatic cancer Father 61    Celiac disease Sister     Other Sister         idopathic thrombocytopenic purpura    Lupus Sister     Lung cancer Maternal Grandmother     Other Maternal Grandfather         renal failure    Breast cancer Paternal Grandmother 54    No Known Problems Daughter     Breast cancer Paternal Aunt 46     I have reviewed and agree with the history as documented  E-Cigarette/Vaping    E-Cigarette Use Never User      E-Cigarette/Vaping Substances    Nicotine No     THC No     CBD No     Flavoring No     Other No     Unknown No      Social History     Tobacco Use    Smoking status: Never Smoker    Smokeless tobacco: Never Used   Substance Use Topics    Alcohol use: Not Currently     Frequency: Never     Drinks per session: 1 or 2     Binge frequency: Never     Comment: social    Drug use: No     Comment: medical marijuana       Review of Systems   Respiratory: Negative  Cardiovascular: Negative  Gastrointestinal: Positive for abdominal pain  Negative for constipation and vomiting  Genitourinary: Negative  Neurological: Negative  All other systems reviewed and are negative  Physical Exam  Physical Exam  Vitals signs and nursing note reviewed  Constitutional:       Appearance: She is well-developed  HENT:      Head: Normocephalic and atraumatic  Right Ear: Tympanic membrane and external ear normal       Left Ear: Tympanic membrane and external ear normal    Eyes:      Conjunctiva/sclera: Conjunctivae normal    Neck:      Musculoskeletal: Neck supple  Cardiovascular:      Rate and Rhythm: Normal rate and regular rhythm  Heart sounds: Normal heart sounds  Pulmonary:      Effort: Pulmonary effort is normal       Breath sounds: Normal breath sounds     Abdominal:      General: Bowel sounds are normal  Palpations: Abdomen is soft  Tenderness: There is generalized abdominal tenderness and tenderness in the left upper quadrant  There is no right CVA tenderness, left CVA tenderness, guarding or rebound  Negative signs include Vazquez's sign  Comments: Right upper quadrant and right lower quadrant is completely nontender  Abdomen is soft and there is no rebound or guarding  Musculoskeletal: Normal range of motion  Lymphadenopathy:      Cervical: No cervical adenopathy  Skin:     General: Skin is warm  Findings: No rash  Neurological:      Mental Status: She is alert     Psychiatric:         Behavior: Behavior normal          Vital Signs  ED Triage Vitals   Temperature Pulse Respirations Blood Pressure SpO2   04/30/21 1200 04/30/21 1201 04/30/21 1200 04/30/21 1201 04/30/21 1200   98 3 °F (36 8 °C) 82 18 163/93 98 %      Temp Source Heart Rate Source Patient Position - Orthostatic VS BP Location FiO2 (%)   04/30/21 1200 04/30/21 1200 -- -- --   Oral Monitor         Pain Score       --                  Vitals:    04/30/21 1201   BP: 163/93   Pulse: 82         Visual Acuity      ED Medications  Medications   Lidocaine Viscous HCl (XYLOCAINE) 2 % mucosal solution 15 mL (15 mL Swish & Swallow Given 4/30/21 1348)   aluminum-magnesium hydroxide-simethicone (MYLANTA) oral suspension 30 mL (30 mL Oral Given 4/30/21 1348)   pantoprazole (PROTONIX) EC tablet 40 mg (40 mg Oral Given 4/30/21 1348)       Diagnostic Studies  Results Reviewed     Procedure Component Value Units Date/Time    Urine Microscopic [[id number]]  (Normal) Collected: 04/30/21 1353    Lab Status: Final result Specimen: Urine, Clean Catch Updated: 04/30/21 1422     RBC, UA 0-1 /hpf      WBC, UA 0-1 /hpf      Epithelial Cells Occasional /hpf      Bacteria, UA Occasional /hpf     Troponin I [[id number]]  (Normal) Collected: 04/30/21 1346    Lab Status: Final result Specimen: Blood from Arm, Left Updated: 04/30/21 1406     Troponin I <0 02 ng/mL POCT pregnancy, urine [[id number]]  (Normal) Resulted: 04/30/21 1359    Lab Status: Final result Updated: 04/30/21 1359     EXT PREG TEST UR (Ref: Negative) negative     Control valid    Urine Macroscopic, POC [[id number]]  (Abnormal) Collected: 04/30/21 1353    Lab Status: Final result Specimen: Urine Updated: 04/30/21 1355     Color, UA Yellow     Clarity, UA Clear     pH, UA 7 0     Leukocytes, UA Negative     Nitrite, UA Negative     Protein, UA Negative mg/dl      Glucose, UA Negative mg/dl      Ketones, UA Negative mg/dl      Urobilinogen, UA 0 2 E U /dl      Bilirubin, UA Negative     Blood, UA Trace     Specific Gravity, UA >=1 030    Narrative:      CL[facility] RESULT    Comprehensive metabolic panel [[id number]] Collected: 04/30/21 1200    Lab Status: Final result Specimen: Blood from Arm, Right Updated: 04/30/21 1230     Sodium 141 mmol/L      Potassium 3 7 mmol/L      Chloride 103 mmol/L      CO2 27 mmol/L      ANION GAP 11 mmol/L      BUN 16 mg/dL      Creatinine 0 92 mg/dL      Glucose 100 mg/dL      Calcium 9 2 mg/dL      AST 19 U/L      ALT 37 U/L      Alkaline Phosphatase 62 U/L      Total Protein 7 6 g/dL      Albumin 4 0 g/dL      Total Bilirubin 0 31 mg/dL      eGFR 77 ml/min/1 73sq m     Narrative:      [place] guidelines for Chronic Kidney Disease (CKD):     Stage 1 with normal or high GFR (GFR > 90 mL/min/1 73 square meters)    Stage 2 Mild CKD (GFR = 60-89 mL/min/1 73 square meters)    Stage 3A Moderate CKD (GFR = 45-59 mL/min/1 73 square meters)    Stage 3B Moderate CKD (GFR = 30-44 mL/min/1 73 square meters)    Stage 4 Severe CKD (GFR = 15-29 mL/min/1 73 square meters)    Stage 5 End Stage CKD (GFR <15 mL/min/1 73 square meters)  Note: GFR calculation is accurate only with a steady state creatinine    Lipase [[id number]]  (Normal) Collected: 04/30/21 1200    Lab Status: Final result Specimen: Blood from Arm, Right Updated: 04/30/21 1230     Lipase 73 u/L CBC and differential [[id number]]  (Abnormal) Collected: 04/30/21 1200    Lab Status: Final result Specimen: Blood from Arm, Right Updated: 04/30/21 1211     WBC 8 85 Thousand/uL      RBC 4 77 Million/uL      Hemoglobin 14 0 g/dL      Hematocrit 41 7 %      MCV 87 fL      MCH 29 4 pg      MCHC 33 6 g/dL      RDW 12 2 %      MPV 8 8 fL      Platelets 617 Thousands/uL      nRBC 0 /100 WBCs      Neutrophils Relative 55 %      Immat GRANS % 0 %      Lymphocytes Relative 37 %      Monocytes Relative 7 %      Eosinophils Relative 1 %      Basophils Relative 0 %      Neutrophils Absolute 4 83 Thousands/µL      Immature Grans Absolute 0 02 Thousand/uL      Lymphocytes Absolute 3 28 Thousands/µL      Monocytes Absolute 0 64 Thousand/µL      Eosinophils Absolute 0 05 Thousand/µL      Basophils Absolute 0 03 Thousands/µL                  No orders to display              Procedures  ECG 12 Lead Documentation Only    Date/Time: 4/30/2021 2:24 PM  Performed by: Shiv Reynoso PA-C  Authorized by: Shiv Reynoso PA-C     Patient location:  ED  Previous ECG:     Previous ECG:  Compared to current    Comparison ECG info:  Dec 2019    Similarity:  No change  Rate:     ECG rate:  84    ECG rate assessment: normal    Rhythm:     Rhythm: sinus rhythm    Ectopy:     Ectopy: none    QRS:     QRS axis:  Normal  Conduction:     Conduction: normal    ST segments:     ST segments:  Normal  T waves:     T waves: normal               ED Course             HEART Risk Score      Most Recent Value   Heart Score Risk Calculator   History  0 Filed at: 04/30/2021 1526   ECG  0 Filed at: 04/30/2021 1526   Age  0 Filed at: 04/30/2021 1526   Risk Factors  1 Filed at: 04/30/2021 1526   Troponin  0 Filed at: 04/30/2021 1526   HEART Score  1 Filed at: 04/30/2021 1526                                    MDM  Number of Diagnoses or Management Options  Epigastric abdominal pain: new and requires workup  GERD (gastroesophageal reflux disease): new and requires workup  Diagnosis management comments: Patient does not have a no right upper quadrant or right lower quadrant abdominal pain patient's home is epigastric and left upper abdominal pain  No change of pain with eating  Labs do not suggest cholecystitis  Patient agrees with not getting an ultrasound or CT scan and will follow-up with her GI doctor next visit  Discussed importance of close follow-up and reasons to return to the emergency department  Will treat this as GERD/peptic ulcer disease discussed that she will need to have an endoscopy done  She understands the need to follow-up and reasons to return  Amount and/or Complexity of Data Reviewed  Clinical lab tests: reviewed  Obtain history from someone other than the patient: yes  Review and summarize past medical records: yes    Patient Progress  Patient progress: improved      Disposition  Final diagnoses:   Epigastric abdominal pain   GERD (gastroesophageal reflux disease)     Time reflects when diagnosis was documented in both MDM as applicable and the Disposition within this note     Time User Action Codes Description Comment    4/30/2021  3:06 PM Mayra Dai [R10 13] Epigastric abdominal pain     4/30/2021  3:06 PM Mayra Dai [K21 9] GERD (gastroesophageal reflux disease)       ED Disposition     ED Disposition Condition Date/Time Comment    Discharge Stable Fri Apr 30, 2021  3:06 PM [id number] discharge to home/self care              Follow-up Information     Follow up With Specialties Details Why Contact Info Chema Douglas MD Family Medicine   [address]  [address] [facility] [id number]       [facility]   [address] [address] [phone] [facility] [place], [place] [address] [place], [place], [address]          Discharge Medication List as of 4/30/2021  3:08 PM      START taking these medications    Details   omeprazole (PriLOSEC) 20 mg delayed release capsule Take 1 capsule (20 mg total) by mouth 2 (two) times a day for 14 days, Starting Fri 4/30/2021, Until Fri 5/14/2021, Normal         CONTINUE these medications which have NOT CHANGED    Details   ALPRAZolam (XANAX) 0 25 mg tablet Take 1 tablet (0 25 mg total) by mouth daily at bedtime as needed for anxiety, Starting u 2/11/2021, Normal      Cetirizine HCl (ZYRTEC ALLERGY PO) Take by mouth, Historical Med      diazepam (VALIUM) 2 mg tablet Take 1 tablet (2 mg total) by mouth every 6 (six) hours as needed (vertigo), Starting Mon 3/29/2021, Normal      FLUoxetine (PROzac) 10 mg capsule TAKE 1 CAPSULE BY MOUTH EVERY DAY, Normal           No discharge procedures on file      PDMP Review       Value Time User    PDMP Reviewed  Yes 3/29/2021  5:42 PM Chico Cannon PA-C          ED Provider  Electronically Signed by           Tayo Betancur PA-C  04/30/21 4299

## 2021-04-30 NOTE — Clinical Note
Minerva Fam was seen and treated in our emergency department on 4/30/2021  Diagnosis:     Mahnaz English    She may return on this date: 05/03/2021         If you have any questions or concerns, please don't hesitate to call        Mayra Dai PA-C    ______________________________           _______________          _______________  Hospital Representative                              Date                                Time

## 2021-05-02 LAB
ATRIAL RATE: 84 BPM
P AXIS: 73 DEGREES
PR INTERVAL: 150 MS
QRS AXIS: 75 DEGREES
QRSD INTERVAL: 94 MS
QT INTERVAL: 394 MS
QTC INTERVAL: 465 MS
T WAVE AXIS: 49 DEGREES
VENTRICULAR RATE: 84 BPM

## 2021-05-02 PROCEDURE — 93010 ELECTROCARDIOGRAM REPORT: CPT | Performed by: INTERNAL MEDICINE

## 2021-05-06 ENCOUNTER — OFFICE VISIT (OUTPATIENT)
Dept: GASTROENTEROLOGY | Facility: AMBULARY SURGERY CENTER | Age: 41
End: 2021-05-06
Payer: COMMERCIAL

## 2021-05-06 VITALS
BODY MASS INDEX: 32.85 KG/M2 | WEIGHT: 192.4 LBS | DIASTOLIC BLOOD PRESSURE: 96 MMHG | SYSTOLIC BLOOD PRESSURE: 142 MMHG | HEIGHT: 64 IN

## 2021-05-06 DIAGNOSIS — K21.9 GERD (GASTROESOPHAGEAL REFLUX DISEASE): ICD-10-CM

## 2021-05-06 DIAGNOSIS — Z12.11 COLON CANCER SCREENING: Primary | ICD-10-CM

## 2021-05-06 DIAGNOSIS — K76.0 HEPATIC STEATOSIS: ICD-10-CM

## 2021-05-06 PROCEDURE — 99214 OFFICE O/P EST MOD 30 MIN: CPT | Performed by: INTERNAL MEDICINE

## 2021-05-06 RX ORDER — CYCLOSPORINE 0.5 MG/ML
1 EMULSION OPHTHALMIC EVERY 12 HOURS
COMMUNITY
Start: 2021-02-22

## 2021-05-06 RX ORDER — OMEPRAZOLE 20 MG/1
20 CAPSULE, DELAYED RELEASE ORAL 2 TIMES DAILY
Qty: 60 CAPSULE | Refills: 3 | Status: SHIPPED | OUTPATIENT
Start: 2021-05-06 | End: 2021-09-13 | Stop reason: SDUPTHER

## 2021-05-06 NOTE — LETTER
May 7, 2021     Jaden Coats MD  [address]  [address] [place] [address]    Patient: Alvarado Haque   YOB: 1980   Date of Visit: 5/6/2021       Dear Dr Patrice Ewing:    Thank you for referring Alvarado Haque to me for evaluation  Below are my notes for this consultation  If you have questions, please do not hesitate to call me  I look forward to following your patient along with you  Sincerely,        Luzma Washington MD        CC: No Recipients  Luzma Washington MD  5/7/2021  7:42 AM  Sign when Signing Visit  [facility]  Progress Note - Ramon Lizarraga 41 y o  female MRN: [id number]    Unit/Bed#:  Encounter: [id number]    Assessment/Plan:    1  Dyspepsia-possibly secondary to peptic ulcer disease versus gastritis versus less likely biliary dyskinesia as symptoms have improved significantly since starting omeprazole 20 mg b i d     Would recommend to continue this for now  Would also recommend to avoid the use of NSAIDs  -we went over dietary modifications including avoiding acidic foods, avoiding carbonated drinks, alcoholic beverages and caffeine intake   -would recommend biopsying for celiac at the time of the endoscopy  -will plan for EGD at this time  2  Colon cancer screening-high risk due to family history of colon cancer in maternal uncle, no change in bowel habits herself, has had chronic loose stools with certain dietary indiscretions  I suspect there may be component of IBS versus celiac  Would recommend colonoscopy at this time for screening purposes  Would also recommend biopsy small-bowel at the time of the EGD  Patient was explained about  the risks and benefits of the procedure  Risks including but not limited to bleeding, infection, perforation were explained in detail  Also explained about less than 100% sensitivity with the exam and other alternatives          3  Moderate hepatic steatosis-recommend healthy weight loss, BMI of 33, fortunately liver functions are normal at this time  Avoid hepatotoxic medications  Subjective:   49-year-old female with history of GERD presents for follow-up  Patient was seen 1 year ago for similar symptoms  Patient reports that recently she was in the ER with symptoms of epigastric abdominal pain that started set only  Patient does have globus sensation at baseline, reports that this has been recently improving  In the ER, she was started on of omeprazole 20 mg b i d , she reports that she has had symptomatic improvement in epigastric pain since doing so  She reports a her only new medication being Prozac over the past few months, reports occasional use of NSAIDs  Denies any hematemesis, coffee-ground emesis or melena  No change in bowel habits but does report occasional loose stools, typically with certain dietary indiscretions  She does have family history of colon cancer in maternal uncle at age of 72  She was recommended a screening colonoscopy last year however has not had this done yet  No history of peptic ulcer disease, no melena or hematochezia  Lab work recently showed normal lipase, normal LFTs, normal CBC in the emergency room  Ultrasound last year without any evidence of gallstones or intrahepatic biliary ductal dilatation, liver appeared to have moderate hepatic steatosis  Objective:     Vitals: Blood pressure 142/96, height 5' 3 62" (1 616 m), weight 87 3 kg (192 lb 6 4 oz), not currently breastfeeding  ,Body mass index is 33 42 kg/m²  [unfilled]    Physical Exam:    GEN: wn/wd, NAD  HEENT: MMM, no cervical or supraclavicular LAD, anciteric  CV: RRR, no m/r/g  CHEST: CTA b/l, no w/r/r  ABD: +BS, soft, NT/ND, no hepatosplenomegaly  EXT: no c/c/e  SKIN: no rashes  NEURO: aaox3      Invasive Devices     None                         Lab, Imaging and other studies:     No visits with results within 1 Day(s) from this visit     Latest known visit with results is:   Admission on 04/30/2021, Discharged on 04/30/2021   Component Date Value    WBC 04/30/2021 8 85     RBC 04/30/2021 4 77     Hemoglobin 04/30/2021 14 0     Hematocrit 04/30/2021 41 7     MCV 04/30/2021 87     MCH 04/30/2021 29 4     MCHC 04/30/2021 33 6     RDW 04/30/2021 12 2     MPV 04/30/2021 8 8*    Platelets 48/32/1223 347     nRBC 04/30/2021 0     Neutrophils Relative 04/30/2021 55     Immat GRANS % 04/30/2021 0     Lymphocytes Relative 04/30/2021 37     Monocytes Relative 04/30/2021 7     Eosinophils Relative 04/30/2021 1     Basophils Relative 04/30/2021 0     Neutrophils Absolute 04/30/2021 4 83     Immature Grans Absolute 04/30/2021 0 02     Lymphocytes Absolute 04/30/2021 3 28     Monocytes Absolute 04/30/2021 0 64     Eosinophils Absolute 04/30/2021 0 05     Basophils Absolute 04/30/2021 0 03     Sodium 04/30/2021 141     Potassium 04/30/2021 3 7     Chloride 04/30/2021 103     CO2 04/30/2021 27     ANION GAP 04/30/2021 11     BUN 04/30/2021 16     Creatinine 04/30/2021 0 92     Glucose 04/30/2021 100     Calcium 04/30/2021 9 2     AST 04/30/2021 19     ALT 04/30/2021 37     Alkaline Phosphatase 04/30/2021 62     Total Protein 04/30/2021 7 6     Albumin 04/30/2021 4 0     Total Bilirubin 04/30/2021 0 31     eGFR 04/30/2021 77     Lipase 04/30/2021 73     Troponin I 04/30/2021 <0 02     EXT PREG TEST UR (Ref: N* 04/30/2021 negative     Control 04/30/2021 valid     Color, UA 04/30/2021 Yellow     Clarity, UA 04/30/2021 Clear     pH, UA 04/30/2021 7 0     Leukocytes, UA 04/30/2021 Negative     Nitrite, UA 04/30/2021 Negative     Protein, UA 04/30/2021 Negative     Glucose, UA 04/30/2021 Negative     Ketones, UA 04/30/2021 Negative     Urobilinogen, UA 04/30/2021 0 2     Bilirubin, UA 04/30/2021 Negative     Blood, UA 04/30/2021 Trace*    Specific Gravity, UA 04/30/2021 >=1 030     RBC, UA 04/30/2021 0-1     WBC, UA 04/30/2021 0-1     Epithelial Cells 04/30/2021 Occasional     Bacteria, UA 04/30/2021 Occasional     Ventricular Rate 04/30/2021 84     Atrial Rate 04/30/2021 84     [place] Interval 04/30/2021 150     QRSD Interval 04/30/2021 94     QT Interval 04/30/2021 394     QTC Interval 04/30/2021 465     P Axis 04/30/2021 73     QRS Axis 04/30/2021 75     T Wave Axis 04/30/2021 49          I have personally reviewed pertinent films in PACS    No current facility-administered medications for this visit                Answers for HPI/ROS submitted by the patient on 4/29/2021   Abdominal pain  Chronicity: recurrent  Onset: more than 1 year ago  Onset quality: undetermined  Frequency: daily  Episode duration: 1 hours  Progression since onset: waxing and waning  Pain location: epigastric region, generalized abdominal region  Pain - numeric: 5/10  Pain quality: cramping  Radiates to: chest  anorexia: No  arthralgias: No  belching: Yes  constipation: No  diarrhea: Yes  fever: No  flatus: Yes  frequency: Yes  headaches: Yes  hematochezia: No  hematuria: No  melena: No  myalgias: No  nausea: Yes  weight loss: No  vomiting: No  Aggravated by: nothing  Relieved by: belching, bowel movements, certain positions, palpation, passing flatus, recumbency, standing  Diagnostic workup: ultrasound

## 2021-05-07 PROBLEM — Z12.11 COLON CANCER SCREENING: Status: ACTIVE | Noted: 2019-06-17

## 2021-05-07 NOTE — PROGRESS NOTES
[facility]  Progress Note - Ranjan Avalos 41 y o  female MRN: [id number]    Unit/Bed#:  Encounter: [id number]    Assessment/Plan:    1  Dyspepsia-possibly secondary to peptic ulcer disease versus gastritis versus less likely biliary dyskinesia as symptoms have improved significantly since starting omeprazole 20 mg b i d     Would recommend to continue this for now  Would also recommend to avoid the use of NSAIDs  -we went over dietary modifications including avoiding acidic foods, avoiding carbonated drinks, alcoholic beverages and caffeine intake   -would recommend biopsying for celiac at the time of the endoscopy  -will plan for EGD at this time  2  Colon cancer screening-high risk due to family history of colon cancer in maternal uncle, no change in bowel habits herself, has had chronic loose stools with certain dietary indiscretions  I suspect there may be component of IBS versus celiac  Would recommend colonoscopy at this time for screening purposes  Would also recommend biopsy small-bowel at the time of the EGD  Patient was explained about  the risks and benefits of the procedure  Risks including but not limited to bleeding, infection, perforation were explained in detail  Also explained about less than 100% sensitivity with the exam and other alternatives  3  Moderate hepatic steatosis-recommend healthy weight loss, BMI of 33, fortunately liver functions are normal at this time  Avoid hepatotoxic medications  Subjective:   39-year-old female with history of GERD presents for follow-up  Patient was seen 1 year ago for similar symptoms  Patient reports that recently she was in the ER with symptoms of epigastric abdominal pain that started set only  Patient does have globus sensation at baseline, reports that this has been recently improving    In the ER, she was started on of omeprazole 20 mg b i d , she reports that she has had symptomatic improvement in epigastric pain since doing so  She reports a her only new medication being Prozac over the past few months, reports occasional use of NSAIDs  Denies any hematemesis, coffee-ground emesis or melena  No change in bowel habits but does report occasional loose stools, typically with certain dietary indiscretions  She does have family history of colon cancer in maternal uncle at age of 72  She was recommended a screening colonoscopy last year however has not had this done yet  No history of peptic ulcer disease, no melena or hematochezia  Lab work recently showed normal lipase, normal LFTs, normal CBC in the emergency room  Ultrasound last year without any evidence of gallstones or intrahepatic biliary ductal dilatation, liver appeared to have moderate hepatic steatosis  Objective:     Vitals: Blood pressure 142/96, height 5' 3 62" (1 616 m), weight 87 3 kg (192 lb 6 4 oz), not currently breastfeeding  ,Body mass index is 33 42 kg/m²  [unfilled]    Physical Exam:    GEN: wn/wd, NAD  HEENT: MMM, no cervical or supraclavicular LAD, anciteric  CV: RRR, no m/r/g  CHEST: CTA b/l, no w/r/r  ABD: +BS, soft, NT/ND, no hepatosplenomegaly  EXT: no c/c/e  SKIN: no rashes  NEURO: aaox3      Invasive Devices     None                         Lab, Imaging and other studies:     No visits with results within 1 Day(s) from this visit     Latest known visit with results is:   Admission on 04/30/2021, Discharged on 04/30/2021   Component Date Value    WBC 04/30/2021 8 85     RBC 04/30/2021 4 77     Hemoglobin 04/30/2021 14 0     Hematocrit 04/30/2021 41 7     MCV 04/30/2021 87     MCH 04/30/2021 29 4     MCHC 04/30/2021 33 6     RDW 04/30/2021 12 2     MPV 04/30/2021 8 8*    Platelets 73/79/4473 347     nRBC 04/30/2021 0     Neutrophils Relative 04/30/2021 55     Immat GRANS % 04/30/2021 0     Lymphocytes Relative 04/30/2021 37     Monocytes Relative 04/30/2021 7     Eosinophils Relative 04/30/2021 1     Basophils Relative 04/30/2021 0     Neutrophils Absolute 04/30/2021 4 83     Immature Grans Absolute 04/30/2021 0 02     Lymphocytes Absolute 04/30/2021 3 28     Monocytes Absolute 04/30/2021 0 64     Eosinophils Absolute 04/30/2021 0 05     Basophils Absolute 04/30/2021 0 03     Sodium 04/30/2021 141     Potassium 04/30/2021 3 7     Chloride 04/30/2021 103     CO2 04/30/2021 27     ANION GAP 04/30/2021 11     BUN 04/30/2021 16     Creatinine 04/30/2021 0 92     Glucose 04/30/2021 100     Calcium 04/30/2021 9 2     AST 04/30/2021 19     ALT 04/30/2021 37     Alkaline Phosphatase 04/30/2021 62     Total Protein 04/30/2021 7 6     Albumin 04/30/2021 4 0     Total Bilirubin 04/30/2021 0 31     eGFR 04/30/2021 77     Lipase 04/30/2021 73     Troponin I 04/30/2021 <0 02     EXT PREG TEST UR (Ref: N* 04/30/2021 negative     Control 04/30/2021 valid     Color, UA 04/30/2021 Yellow     Clarity, UA 04/30/2021 Clear     pH, UA 04/30/2021 7 0     Leukocytes, UA 04/30/2021 Negative     Nitrite, UA 04/30/2021 Negative     Protein, UA 04/30/2021 Negative     Glucose, UA 04/30/2021 Negative     Ketones, UA 04/30/2021 Negative     Urobilinogen, UA 04/30/2021 0 2     Bilirubin, UA 04/30/2021 Negative     Blood, UA 04/30/2021 Trace*    Specific Gravity, UA 04/30/2021 >=1 030     RBC, UA 04/30/2021 0-1     WBC, UA 04/30/2021 0-1     Epithelial Cells 04/30/2021 Occasional     Bacteria, UA 04/30/2021 Occasional     Ventricular Rate 04/30/2021 84     Atrial Rate 04/30/2021 84     HI Interval 04/30/2021 150     QRSD Interval 04/30/2021 94     QT Interval 04/30/2021 394     QTC Interval 04/30/2021 465     P Axis 04/30/2021 73     QRS Axis 04/30/2021 75     T Wave Axis 04/30/2021 49          I have personally reviewed pertinent films in PACS    No current facility-administered medications for this visit                Answers for HPI/ROS submitted by the patient on 4/29/2021   Abdominal pain  Chronicity: recurrent  Onset: more than 1 year ago  Onset quality: undetermined  Frequency: daily  Episode duration: 1 hours  Progression since onset: waxing and waning  Pain location: epigastric region, generalized abdominal region  Pain - numeric: 5/10  Pain quality: cramping  Radiates to: chest  anorexia: No  arthralgias: No  belching: Yes  constipation: No  diarrhea: Yes  fever: No  flatus: Yes  frequency: Yes  headaches: Yes  hematochezia: No  hematuria: No  melena: No  myalgias: No  nausea: Yes  weight loss: No  vomiting: No  Aggravated by: nothing  Relieved by: belching, bowel movements, certain positions, palpation, passing flatus, recumbency, standing  Diagnostic workup: ultrasound

## 2021-05-13 ENCOUNTER — OFFICE VISIT (OUTPATIENT)
Dept: FAMILY MEDICINE CLINIC | Facility: CLINIC | Age: 41
End: 2021-05-13
Payer: COMMERCIAL

## 2021-05-13 VITALS
WEIGHT: 192.6 LBS | SYSTOLIC BLOOD PRESSURE: 132 MMHG | RESPIRATION RATE: 16 BRPM | DIASTOLIC BLOOD PRESSURE: 78 MMHG | HEART RATE: 90 BPM | BODY MASS INDEX: 32.88 KG/M2 | HEIGHT: 64 IN | OXYGEN SATURATION: 98 % | TEMPERATURE: 98.6 F

## 2021-05-13 DIAGNOSIS — J01.01 ACUTE RECURRENT MAXILLARY SINUSITIS: Primary | ICD-10-CM

## 2021-05-13 DIAGNOSIS — F41.9 ANXIETY: ICD-10-CM

## 2021-05-13 DIAGNOSIS — K21.9 GASTROESOPHAGEAL REFLUX DISEASE, UNSPECIFIED WHETHER ESOPHAGITIS PRESENT: ICD-10-CM

## 2021-05-13 PROCEDURE — 1036F TOBACCO NON-USER: CPT | Performed by: FAMILY MEDICINE

## 2021-05-13 PROCEDURE — 3008F BODY MASS INDEX DOCD: CPT | Performed by: FAMILY MEDICINE

## 2021-05-13 PROCEDURE — 99214 OFFICE O/P EST MOD 30 MIN: CPT | Performed by: FAMILY MEDICINE

## 2021-05-13 RX ORDER — MOMETASONE FUROATE 50 UG/1
2 SPRAY, METERED NASAL DAILY
Qty: 17 G | Refills: 0 | Status: SHIPPED | OUTPATIENT
Start: 2021-05-13 | End: 2021-06-04

## 2021-05-13 RX ORDER — DOXYCYCLINE 100 MG/1
100 TABLET ORAL 2 TIMES DAILY
Qty: 14 TABLET | Refills: 0 | Status: SHIPPED | OUTPATIENT
Start: 2021-05-13 | End: 2021-05-20

## 2021-05-13 NOTE — PROGRESS NOTES
Assessment/Plan:    No problem-specific Assessment & Plan notes found for this encounter  Diagnoses and all orders for this visit:    Acute recurrent maxillary sinusitis  -     doxycycline (ADOXA) 100 MG tablet; Take 1 tablet (100 mg total) by mouth 2 (two) times a day for 7 days  -     mometasone (NASONEX) 50 mcg/act nasal spray; 2 sprays into each nostril daily    Anxiety  Comments:  stable with prozac    Gastroesophageal reflux disease, unspecified whether esophagitis present  Comments:  to avoid triggers           Subjective:      Patient ID: Tiesha Hernandez is a 41 y o  female  Sinusitis  This is a new problem  The current episode started 1 to 4 weeks ago  There has been no fever  Associated symptoms include congestion, coughing and sneezing  Pertinent negatives include no chills, diaphoresis, ear pain, headaches, hoarse voice, neck pain, shortness of breath, sinus pressure, sore throat or swollen glands  Treatments tried: mucinex  The treatment provided mild relief  The following portions of the patient's history were reviewed and updated as appropriate: allergies, current medications, past family history, past medical history, past social history, past surgical history and problem list     Review of Systems   Constitutional: Negative for chills and diaphoresis  HENT: Positive for congestion and sneezing  Negative for ear pain, hoarse voice, sinus pressure and sore throat  Respiratory: Positive for cough  Negative for shortness of breath  Musculoskeletal: Negative for neck pain  Neurological: Negative for headaches  Objective:      /78 (BP Location: Left arm, Patient Position: Sitting, Cuff Size: Adult)   Pulse 90   Temp 98 6 °F (37 °C) (Tympanic)   Resp 16   Ht 5' 3 62" (1 616 m)   Wt 87 4 kg (192 lb 9 6 oz)   SpO2 98%   BMI 33 46 kg/m²          Physical Exam  Constitutional:       Appearance: Normal appearance     HENT:      Right Ear: Tympanic membrane normal  Left Ear: Tympanic membrane normal       Nose: Congestion present  No rhinorrhea  Right Sinus: No maxillary sinus tenderness  Left Sinus: Maxillary sinus tenderness present  Eyes:      Pupils: Pupils are equal, round, and reactive to light  Cardiovascular:      Rate and Rhythm: Normal rate and regular rhythm  Pulmonary:      Effort: Pulmonary effort is normal       Breath sounds: Normal breath sounds  Neurological:      General: No focal deficit present  Mental Status: She is alert and oriented to person, place, and time     Psychiatric:         Mood and Affect: Mood normal          Behavior: Behavior normal

## 2021-06-04 DIAGNOSIS — J01.01 ACUTE RECURRENT MAXILLARY SINUSITIS: ICD-10-CM

## 2021-06-04 RX ORDER — MOMETASONE FUROATE 50 UG/1
SPRAY, METERED NASAL
Qty: 17 G | Refills: 3 | Status: SHIPPED | OUTPATIENT
Start: 2021-06-04

## 2021-07-01 ENCOUNTER — ANESTHESIA (OUTPATIENT)
Dept: ANESTHESIOLOGY | Facility: HOSPITAL | Age: 41
End: 2021-07-01

## 2021-07-01 ENCOUNTER — ANESTHESIA EVENT (OUTPATIENT)
Dept: ANESTHESIOLOGY | Facility: HOSPITAL | Age: 41
End: 2021-07-01

## 2021-07-14 ENCOUNTER — TELEPHONE (OUTPATIENT)
Dept: GASTROENTEROLOGY | Facility: AMBULARY SURGERY CENTER | Age: 41
End: 2021-07-14

## 2021-07-15 ENCOUNTER — ANESTHESIA (OUTPATIENT)
Dept: GASTROENTEROLOGY | Facility: AMBULARY SURGERY CENTER | Age: 41
End: 2021-07-15

## 2021-07-15 ENCOUNTER — ANESTHESIA EVENT (OUTPATIENT)
Dept: GASTROENTEROLOGY | Facility: AMBULARY SURGERY CENTER | Age: 41
End: 2021-07-15

## 2021-07-15 ENCOUNTER — HOSPITAL ENCOUNTER (OUTPATIENT)
Dept: GASTROENTEROLOGY | Facility: AMBULARY SURGERY CENTER | Age: 41
Setting detail: OUTPATIENT SURGERY
Discharge: HOME | End: 2021-07-15
Attending: INTERNAL MEDICINE
Payer: COMMERCIAL

## 2021-07-15 VITALS
TEMPERATURE: 97 F | SYSTOLIC BLOOD PRESSURE: 104 MMHG | RESPIRATION RATE: 18 BRPM | OXYGEN SATURATION: 98 % | BODY MASS INDEX: 33.66 KG/M2 | HEIGHT: 63 IN | DIASTOLIC BLOOD PRESSURE: 83 MMHG | WEIGHT: 190 LBS | HEART RATE: 64 BPM

## 2021-07-15 DIAGNOSIS — Z12.11 COLON CANCER SCREENING: ICD-10-CM

## 2021-07-15 DIAGNOSIS — K21.9 GASTROESOPHAGEAL REFLUX DISEASE WITHOUT ESOPHAGITIS: ICD-10-CM

## 2021-07-15 DIAGNOSIS — K76.0 HEPATIC STEATOSIS: ICD-10-CM

## 2021-07-15 DIAGNOSIS — Z80.0 FAMILY HISTORY OF COLON CANCER: ICD-10-CM

## 2021-07-15 LAB
EXT PREGNANCY TEST URINE: NEGATIVE
EXT. CONTROL: NORMAL

## 2021-07-15 PROCEDURE — 88305 TISSUE EXAM BY PATHOLOGIST: CPT | Performed by: PATHOLOGY

## 2021-07-15 PROCEDURE — 81025 URINE PREGNANCY TEST: CPT | Performed by: ANESTHESIOLOGY

## 2021-07-15 PROCEDURE — 43239 EGD BIOPSY SINGLE/MULTIPLE: CPT | Performed by: INTERNAL MEDICINE

## 2021-07-15 PROCEDURE — G0105 COLORECTAL SCRN; HI RISK IND: HCPCS | Performed by: INTERNAL MEDICINE

## 2021-07-15 RX ORDER — PROPOFOL 10 MG/ML
INJECTION, EMULSION INTRAVENOUS CONTINUOUS PRN
Status: DISCONTINUED | OUTPATIENT
Start: 2021-07-15 | End: 2021-07-15

## 2021-07-15 RX ORDER — SODIUM CHLORIDE 9 MG/ML
INJECTION, SOLUTION INTRAVENOUS CONTINUOUS PRN
Status: DISCONTINUED | OUTPATIENT
Start: 2021-07-15 | End: 2021-07-15

## 2021-07-15 RX ORDER — PROPOFOL 10 MG/ML
INJECTION, EMULSION INTRAVENOUS AS NEEDED
Status: DISCONTINUED | OUTPATIENT
Start: 2021-07-15 | End: 2021-07-15

## 2021-07-15 RX ADMIN — Medication 40 MG: at 13:02

## 2021-07-15 RX ADMIN — LIDOCAINE HYDROCHLORIDE 100 MG: 20 INJECTION, SOLUTION INTRAVENOUS at 12:51

## 2021-07-15 RX ADMIN — SODIUM CHLORIDE: 0.9 INJECTION, SOLUTION INTRAVENOUS at 12:36

## 2021-07-15 RX ADMIN — PROPOFOL 120 MCG/KG/MIN: 10 INJECTION, EMULSION INTRAVENOUS at 12:51

## 2021-07-15 RX ADMIN — PROPOFOL 130 MG: 10 INJECTION, EMULSION INTRAVENOUS at 12:51

## 2021-07-15 RX ADMIN — PROPOFOL 70 MG: 10 INJECTION, EMULSION INTRAVENOUS at 12:53

## 2021-07-15 NOTE — H&P
History and Physical -  [facility]  Jayden Carias 41 y o  female MRN: [id number]        HPI:  38-year-old female with family history of colon cancer was seen in the office because of dyspeptic symptoms  Historical Information   Past Medical History:   Diagnosis Date    Asthma     Known health problems: none     Pregnancy     resolved: 11/5/2015     Past Surgical History:   Procedure Laterality Date    ARTHROSCOPY KNEE      BREAST SURGERY      reduction procedure    REDUCTION MAMMAPLASTY Bilateral 1999     Social History   Social History     Substance and Sexual Activity   Alcohol Use Not Currently    Comment: social     Social History     Substance and Sexual Activity   Drug Use Yes    Types: Marijuana    Comment: medical marijuana     Social History     Tobacco Use   Smoking Status Never Smoker   Smokeless Tobacco Never Used     Family History   Problem Relation Age of Onset    Heart attack Father     Hypertension Father     Pancreatic cancer Father 61    Celiac disease Sister     Other Sister         idopathic thrombocytopenic purpura    Lupus Sister     Lung cancer Maternal Grandmother     Other Maternal Grandfather         renal failure    Breast cancer Paternal Grandmother 54    No Known Problems Daughter     Breast cancer Paternal Aunt 46       Meds/Allergies     (Not in a hospital admission)      Allergies   Allergen Reactions    Amoxicillin Hives    Other      Annotation - [id number]: paprika  blackberries    Penicillins Hives    Sulfa Antibiotics Hives       Objective     Blood pressure 151/87, pulse 91, temperature 98 °F (36 7 °C), temperature source Temporal, resp  rate 18, height 5' 3" (1 6 m), weight 86 2 kg (190 lb), SpO2 99 %, not currently breastfeeding      PHYSICAL EXAM:    Gen: NAD  CV: S1 & S2 normal, RRR  CHEST: Clear to auscultate  ABD: soft, NT/ND, good bowel sounds  EXT: no edema    ASSESSMENT:     Dyspepsia, family history of colon cancer    PLAN:    EGD and colonoscopy

## 2021-07-15 NOTE — ANESTHESIA POSTPROCEDURE EVALUATION
Post-Op Assessment Note    CV Status:  Stable  Pain Score: 0    Pain management: adequate     Mental Status:  Awake and alert   Hydration Status:  Stable and euvolemic   PONV Controlled:  None   Airway Patency:  Patent and adequate      Post Op Vitals Reviewed: Yes      Staff: CRNA         No complications documented      /59 (07/15/21 1312)    Temp 97 5 °F (36 4 °C) (07/15/21 1312)    Pulse 81 (07/15/21 1312)   Resp 20 (07/15/21 1312)    SpO2 94 % (07/15/21 1312)

## 2021-07-15 NOTE — ANESTHESIA PREPROCEDURE EVALUATION
Procedure:  COLONOSCOPY  EGD    Relevant Problems   GI/HEPATIC   (+) GERD (gastroesophageal reflux disease)   (+) Hepatic steatosis      NEURO/PSYCH   (+) Anxiety      PULMONARY   (+) Occasional asthma with acute exacerbation        Physical Exam    Airway    Mallampati score: I  TM Distance: >3 FB  Neck ROM: full     Dental       Cardiovascular  Rhythm: regular, Rate: normal,     Pulmonary  Breath sounds clear to auscultation,     Other Findings        Anesthesia Plan  ASA Score- 2     Anesthesia Type- IV sedation with anesthesia with ASA Monitors  Additional Monitors:   Airway Plan:           Plan Factors-Exercise tolerance (METS): >4 METS  Patient is a current smoker  Induction-     Postoperative Plan-     Informed Consent- Anesthetic plan and risks discussed with patient  I personally reviewed this patient with the CRNA  Discussed and agreed on the Anesthesia Plan with the CRNA  Zeenat Sanchez

## 2021-07-22 NOTE — PROGRESS NOTES
Anticipated Discharge Disposition:   SNF    Action:   This RN CM is following the case. Pt rounds with Dr Davis, Pt can discharge to SNF tomorrow , no need for paracentesis.     [facility] Will prepare [facility]/transfer packet.  Informed Dr Davis via voalte.     Barriers to Discharge:   Pending medical clearance    Plan:   CM to continue to assist Pt with discharge as needed   Assessment/Plan:    No problem-specific Assessment & Plan notes found for this encounter  Diagnoses and all orders for this visit:    Left sided abdominal pain  Comments:  ?GERD  to avoid triggers  Orders:  -     H  pylori antigen, stool; Future  -     omeprazole (PriLOSEC) 20 mg delayed release capsule; Take 1 capsule (20 mg total) by mouth daily  -     sucralfate (CARAFATE) 1 g/10 mL suspension; Take 10 mL (1 g total) by mouth 4 (four) times a day (with meals and at bedtime)    Anxiety  Comments:  to hold off on Prozac for now   Xanax PRN   Orders:  -     ALPRAZolam (XANAX) 0 25 mg tablet; Take 1 tablet (0 25 mg total) by mouth daily at bedtime as needed for anxiety          Subjective:      Patient ID: Chelsi Stapleton is a 41 y o  female  Worsening left sided abdominal pain for 3 days  Panic attack yesterday due to craziness at work  Didn't start her Prozac since she was worried about side effects      Anxiety  Presents for follow-up visit  Patient reports no chest pain or nausea  Abdominal Pain  This is a new problem  The current episode started in the past 7 days  The onset quality is gradual  The problem has been rapidly worsening  The pain is located in the LUQ  The pain is at a severity of 3/10  The pain is mild  The quality of the pain is aching and dull  The abdominal pain does not radiate  Pertinent negatives include no anorexia, arthralgias, belching, constipation, diarrhea, dysuria, fever, flatus, frequency, headaches, hematochezia, hematuria, melena, myalgias, nausea, vomiting or weight loss  The pain is aggravated by eating and certain positions (cheesy, fatty food )  The pain is relieved by palpation (laying on her stomach )  There is no history of abdominal surgery or colon cancer            The following portions of the patient's history were reviewed and updated as appropriate: allergies, current medications, past family history, past medical history, past social history, past surgical history and problem list     Review of Systems   Constitutional: Negative for fever and weight loss  Cardiovascular: Negative for chest pain  Gastrointestinal: Positive for abdominal pain  Negative for anorexia, constipation, diarrhea, flatus, hematochezia, melena, nausea and vomiting  Genitourinary: Negative for dysuria, frequency and hematuria  Musculoskeletal: Negative for arthralgias and myalgias  Neurological: Negative for headaches  Objective:      /78 (BP Location: Left arm, Patient Position: Sitting, Cuff Size: Adult)   Pulse 85   Temp 99 °F (37 2 °C) (Tympanic)   Ht 5' 3 47" (1 612 m)   Wt 86 9 kg (191 lb 9 6 oz)   SpO2 98%   BMI 33 44 kg/m²          Physical Exam  Constitutional:       Appearance: She is well-developed  HENT:      Head: Normocephalic and atraumatic  Cardiovascular:      Rate and Rhythm: Normal rate and regular rhythm  Pulmonary:      Effort: Pulmonary effort is normal       Breath sounds: Normal breath sounds  Abdominal:      Tenderness: There is abdominal tenderness in the epigastric area  There is no right CVA tenderness, left CVA tenderness, guarding or rebound  Neurological:      Mental Status: She is alert

## 2021-08-05 DIAGNOSIS — F41.9 ANXIETY: ICD-10-CM

## 2021-08-05 RX ORDER — FLUOXETINE 10 MG/1
CAPSULE ORAL
Qty: 30 CAPSULE | Refills: 5 | Status: SHIPPED | OUTPATIENT
Start: 2021-08-05 | End: 2022-02-06

## 2021-08-13 ENCOUNTER — RA CDI HCC (OUTPATIENT)
Dept: OTHER | Facility: HOSPITAL | Age: 41
End: 2021-08-13

## 2021-08-13 NOTE — PROGRESS NOTES
[facility] 75  coding opportunities       Chart reviewed, no opportunity found: CHART REVIEWED, NO OPPORTUNITY FOUND                        Patients insurance company: [employer] (Medicare Advantage and Commercial)

## 2021-08-20 ENCOUNTER — OFFICE VISIT (OUTPATIENT)
Dept: FAMILY MEDICINE CLINIC | Facility: CLINIC | Age: 41
End: 2021-08-20
Payer: COMMERCIAL

## 2021-08-20 VITALS
SYSTOLIC BLOOD PRESSURE: 120 MMHG | HEART RATE: 76 BPM | BODY MASS INDEX: 33.9 KG/M2 | HEIGHT: 64 IN | DIASTOLIC BLOOD PRESSURE: 86 MMHG | TEMPERATURE: 98.7 F | OXYGEN SATURATION: 99 % | WEIGHT: 198.6 LBS | RESPIRATION RATE: 12 BRPM

## 2021-08-20 DIAGNOSIS — F41.9 ANXIETY: ICD-10-CM

## 2021-08-20 DIAGNOSIS — Z12.31 VISIT FOR SCREENING MAMMOGRAM: ICD-10-CM

## 2021-08-20 DIAGNOSIS — J45.21: ICD-10-CM

## 2021-08-20 DIAGNOSIS — Z00.00 ANNUAL PHYSICAL EXAM: Primary | ICD-10-CM

## 2021-08-20 PROBLEM — Z12.11 COLON CANCER SCREENING: Status: RESOLVED | Noted: 2019-06-17 | Resolved: 2021-08-20

## 2021-08-20 PROBLEM — N90.7 LABIAL CYST: Status: RESOLVED | Noted: 2019-06-17 | Resolved: 2021-08-20

## 2021-08-20 PROCEDURE — 3008F BODY MASS INDEX DOCD: CPT | Performed by: FAMILY MEDICINE

## 2021-08-20 PROCEDURE — 1036F TOBACCO NON-USER: CPT | Performed by: FAMILY MEDICINE

## 2021-08-20 PROCEDURE — 99396 PREV VISIT EST AGE 40-64: CPT | Performed by: FAMILY MEDICINE

## 2021-08-20 RX ORDER — MULTIVITAMIN
1 TABLET ORAL DAILY
COMMUNITY
End: 2022-06-20

## 2021-08-20 RX ORDER — CLINDAMYCIN HYDROCHLORIDE 150 MG/1
CAPSULE ORAL
COMMUNITY
Start: 2021-08-19 | End: 2021-11-30

## 2021-08-20 NOTE — PATIENT INSTRUCTIONS

## 2021-08-20 NOTE — PROGRESS NOTES
[address]    NAME: Eder Shepard  AGE: 41 y o  SEX: female  : 1980     DATE: 2021     Assessment and Plan:     Problem List Items Addressed This Visit        Respiratory    Occasional asthma with acute exacerbation       Other    Anxiety      Other Visit Diagnoses     Annual physical exam    -  Primary    Relevant Orders    Hepatitis C antibody    Lipid Panel with Direct LDL reflex    Visit for screening mammogram        Relevant Orders    Mammo screening bilateral w cad          Immunizations and preventive care screenings were discussed with patient today  Appropriate education was printed on patient's after visit summary  Counseling:  Alcohol/drug use: discussed moderation in alcohol intake, the recommendations for healthy alcohol use, and avoidance of illicit drug use  Dental Health: discussed importance of regular tooth brushing, flossing, and dental visits  Injury prevention: discussed safety/seat belts, safety helmets, smoke detectors, carbon dioxide detectors, and smoking near bedding or upholstery  Sexual health: discussed sexually transmitted diseases, partner selection, use of condoms, avoidance of unintended pregnancy, and contraceptive alternatives  · Exercise: the importance of regular exercise/physical activity was discussed  Recommend exercise 3-5 times per week for at least 30 minutes  No follow-ups on file  Chief Complaint:     Chief Complaint   Patient presents with    Physical Exam     Patient is here today for an annual exam       History of Present Illness:     Adult Annual Physical   Patient here for a comprehensive physical exam  The patient reports no problems  Diet and Physical Activity  · Diet/Nutrition: well balanced diet and consuming 3-5 servings of fruits/vegetables daily  · Exercise: walking and 3-4 times a week on average        Depression Screening  PHQ-9 Depression Screening    PHQ-9:   Frequency of the following problems over the past two weeks:           General Health  · Sleep: sleeps well and gets 7-8 hours of sleep on average  · Hearing: normal - bilateral   · Vision: goes for regular eye exams and most recent eye exam <1 year ago  · Dental: regular dental visits and brushes teeth twice daily  /GYN Health  · Patient is: premenopausal  · Last menstrual period: regular periods   · Contraceptive method: none  Review of Systems:     Review of Systems   Constitutional: Negative  HENT: Negative  Eyes: Negative  Respiratory: Negative  Cardiovascular: Negative  Gastrointestinal: Negative  Endocrine: Negative  Genitourinary: Negative  Musculoskeletal: Negative  Skin: Negative  Allergic/Immunologic: Negative  Neurological: Negative  Hematological: Negative  Psychiatric/Behavioral: Negative         Past Medical History:     Past Medical History:   Diagnosis Date    Asthma     Known health problems: none     Pregnancy     resolved: 11/5/2015      Past Surgical History:     Past Surgical History:   Procedure Laterality Date    ARTHROSCOPY KNEE      BREAST SURGERY      reduction procedure    REDUCTION MAMMAPLASTY Bilateral 1999      Social History:     Social History     Socioeconomic History    Marital status: /Civil Union     Spouse name: None    Number of children: None    Years of education: None    Highest education level: None   Occupational History    None   Tobacco Use    Smoking status: Never Smoker    Smokeless tobacco: Never Used   Vaping Use    Vaping Use: Never used   Substance and Sexual Activity    Alcohol use: Not Currently     Comment: social    Drug use: Yes     Types: Marijuana     Comment: medical marijuana    Sexual activity: Yes     Partners: Male   Other Topics Concern    None   Social History Narrative    Always uses seat belt    Exercise habits      Social Determinants of Health     Financial Resource Strain:     Difficulty of Paying Living Expenses:    Food Insecurity:     Worried About Running Out of Food in the Last Year:     [address] in the Last Year:    Transportation Needs:     Lack of Transportation (Medical):      Lack of Transportation (Non-Medical):    Physical Activity:     Days of Exercise per Week:     Minutes of Exercise per Session:    Stress:     Feeling of Stress :    Social Connections:     Frequency of Communication with Friends and Family:     Frequency of Social Gatherings with Friends and Family:     Attends Muslim Services:     Active Member of Clubs or Organizations:     Attends Club or Organization Meetings:     Marital Status:    Intimate Partner Violence:     Fear of Current or Ex-Partner:     Emotionally Abused:     Physically Abused:     Sexually Abused:       Family History:     Family History   Problem Relation Age of Onset    Heart attack Father     Hypertension Father     Pancreatic cancer Father 61    Celiac disease Sister     Other Sister         idopathic thrombocytopenic purpura    Lupus Sister     Lung cancer Maternal Grandmother     Other Maternal Grandfather         renal failure    Breast cancer Paternal Grandmother 54    No Known Problems Daughter     Breast cancer Paternal Aunt 46      Current Medications:     Current Outpatient Medications   Medication Sig Dispense Refill    clindamycin (CLEOCIN) 150 mg capsule       FLUoxetine (PROzac) 10 mg capsule TAKE 1 CAPSULE BY MOUTH EVERY DAY 30 capsule 5    mometasone (NASONEX) 50 mcg/act nasal spray SPRAY 2 SPRAYS INTO EACH NOSTRIL EVERY DAY (Patient taking differently: 2 sprays into each nostril as needed ) 17 g 3    Multiple Vitamin (multivitamin) tablet Take 1 tablet by mouth daily      Restasis MultiDose 0 05 % ophthalmic emulsion Administer 1 drop to both eyes every 12 (twelve) hours      Cetirizine HCl (ZYRTEC ALLERGY PO) Take by mouth (Patient not taking: Reported on 8/20/2021)      omeprazole (PriLOSEC) 20 mg delayed release capsule Take 1 capsule (20 mg total) by mouth 2 (two) times a day 60 capsule 3     No current facility-administered medications for this visit  Allergies: Allergies   Allergen Reactions    Amoxicillin Hives    Other      Annotation - [id number]: paprika  blackberries    Penicillins Hives    Sulfa Antibiotics Hives      Physical Exam:     /86 (BP Location: Left arm, Patient Position: Sitting, Cuff Size: Adult)   Pulse 76   Temp 98 7 °F (37 1 °C) (Tympanic)   Resp 12   Ht 5' 3 5" (1 613 m)   Wt 90 1 kg (198 lb 9 6 oz)   SpO2 99%   BMI 34 62 kg/m²     Physical Exam  Constitutional:       Appearance: She is well-developed  HENT:      Head: Normocephalic and atraumatic  Right Ear: Tympanic membrane normal       Left Ear: Tympanic membrane normal       Nose: Nose normal       Mouth/Throat:      Mouth: Mucous membranes are moist    Eyes:      Pupils: Pupils are equal, round, and reactive to light  Cardiovascular:      Rate and Rhythm: Normal rate and regular rhythm  Pulmonary:      Effort: Pulmonary effort is normal  No respiratory distress  Breath sounds: Normal breath sounds  No wheezing  Abdominal:      General: Bowel sounds are normal       Palpations: Abdomen is soft  Musculoskeletal:         General: No deformity  Normal range of motion  Cervical back: Normal range of motion and neck supple  Skin:     General: Skin is warm  Capillary Refill: Capillary refill takes less than 2 seconds  Neurological:      General: No focal deficit present  Mental Status: She is alert and oriented to person, place, and time     Psychiatric:         Mood and Affect: Mood normal          Behavior: Behavior normal           Yun Parry MD  [address] [facility]

## 2021-09-03 ENCOUNTER — TELEPHONE (OUTPATIENT)
Dept: OBGYN CLINIC | Facility: CLINIC | Age: 41
End: 2021-09-03

## 2021-09-03 NOTE — TELEPHONE ENCOUNTER
Yes, fine to continues  Risks w/ pregnancy are extremely low - risks of uncontrolled depression/anxiety tend to outweigh any risks of medicine   thanks

## 2021-09-03 NOTE — TELEPHONE ENCOUNTER
----- Message from Idris Child sent at 9/3/2021 11:42 AM EDT -----  Regarding: Non-Urgent Medical Question  Contact: [phone]  I have a quick question  I do not have an appointment till 9/14 but I am currently 6-7 weeks pregnant  I take 10mg of fluoxetine a day  Is it safe to continue?

## 2021-09-05 ENCOUNTER — NURSE TRIAGE (OUTPATIENT)
Dept: OTHER | Facility: OTHER | Age: 41
End: 2021-09-05

## 2021-09-05 NOTE — TELEPHONE ENCOUNTER
Regardin weeks Bowel Movement Bleeding  ----- Message from Dustin Santiago sent at 2021 10:22 AM EDT -----  " I am about 7 weeks pregnant and when I have bowel movement I bleed and I am concerned "

## 2021-09-05 NOTE — TELEPHONE ENCOUNTER
Reason for Disposition   MILD vaginal bleeding (i e , less than 1 pad / hour; less than patient's usual menstrual bleeding; not just spotting)    Answer Assessment - Initial Assessment Questions  1  ONSET: "When did this bleeding start?"        3 days ago, today in afternoon bleeding increased   2  DESCRIPTION: "Describe the bleeding that you are having " "How much bleeding is there?"     - SPOTTING: spotting, or pinkish / brownish mucous discharge; does not fill panti-liner or pad     - MILD:  less than 1 pad / hour; less than patient's usual menstrual bleeding    - MODERATE: 1-2 pads / hour; 1 menstrual cup every 6 hours; small-medium blood clots (e g , pea, grape, small coin)    - SEVERE: soaking 2 or more pads/hour for 2 or more hours; 1 menstrual cup every 2 hours; bleeding not contained by pads or continuous red blood from vagina; large blood clots (e g , golf ball, large coin)       Mild  Patient stated that she doesn't wear a pad, but she sees blood comes out when using a bathroom  3  ABDOMINAL PAIN SEVERITY: If present, ask: "How bad is it?"  (e g , Scale 1-10; mild, moderate, or severe)    - MILD (1-3): doesn't interfere with normal activities, abdomen soft and not tender to touch     - MODERATE (4-7): interferes with normal activities or awakens from sleep, tender to touch     - SEVERE (8-10): excruciating pain, doubled over, unable to do any normal activities       Mild  To moderate cramping,  4 out of 10   4  PREGNANCY: "Do you know how many weeks or months pregnant you are?" "When was the first day of your last normal menstrual period?"      7 weeks   5  HEMODYNAMIC STATUS: "Are you weak or feeling lightheaded?" If Yes, ask: "Can you stand and walk normally?"       No   6   OTHER SYMPTOMS: "What other symptoms are you having with the bleeding?" (e g , passed tissue, vaginal discharge, fever, menstrual-type cramps)      Blood clots, menstrual type cramps    Protocols used: PREGNANCY - VAGINAL BLEEDING LESS THAN 20 WEEKS EGA-ADULT-AH

## 2021-09-05 NOTE — TELEPHONE ENCOUNTER
Regarding: pregnant patient - Bleeding/cramping   ----- Message from Jose Morrison sent at 9/5/2021  2:44 PM EDT -----  "I'm now experiencing bleeding and cramping"

## 2021-09-05 NOTE — TELEPHONE ENCOUNTER
Reason for Disposition   MILD vaginal bleeding (i e , less than 1 pad / hour; less than patient's usual menstrual bleeding; not just spotting)    Answer Assessment - Initial Assessment Questions  1  ONSET: "When did this bleeding start?"        Friday initially and then again today  Vaginal bleeding occurred after having a bowel movement    2  DESCRIPTION: "Describe the bleeding that you are having " "How much bleeding is there?"     - SPOTTING: spotting, or pinkish / brownish mucous discharge; does not fill panti-liner or pad     - MILD:  less than 1 pad / hour; less than patient's usual menstrual bleeding    - MODERATE: 1-2 pads / hour; 1 menstrual cup every 6 hours; small-medium blood clots (e g , pea, grape, small coin)    - SEVERE: soaking 2 or more pads/hour for 2 or more hours; 1 menstrual cup every 2 hours; bleeding not contained by pads or continuous red blood from vagina; large blood clots (e g , golf ball, large coin)       Between spotting and mild    3  ABDOMINAL PAIN SEVERITY: If present, ask: "How bad is it?"  (e g , Scale 1-10; mild, moderate, or severe)    - MILD (1-3): doesn't interfere with normal activities, abdomen soft and not tender to touch     - MODERATE (4-7): interferes with normal activities or awakens from sleep, tender to touch     - SEVERE (8-10): excruciating pain, doubled over, unable to do any normal activities      Denies     4  PREGNANCY: "Do you know how many weeks or months pregnant you are?" "When was the first day of your last normal menstrual period?"      About 7 weeks  LMP 7/18/2021     5  HEMODYNAMIC STATUS: "Are you weak or feeling lightheaded?" If Yes, ask: "Can you stand and walk normally?"       Denies     6  OTHER SYMPTOMS: "What other symptoms are you having with the bleeding?" (e g , passed tissue, vaginal discharge, fever, menstrual-type cramps)      Nausea   Intermittent menstrual-type cramp 3/10    Protocols used: PREGNANCY - VAGINAL BLEEDING LESS THAN 20 WEEKS EGA-ADULT-AH

## 2021-09-07 ENCOUNTER — APPOINTMENT (OUTPATIENT)
Dept: LAB | Facility: CLINIC | Age: 41
End: 2021-09-07
Payer: COMMERCIAL

## 2021-09-07 ENCOUNTER — TELEPHONE (OUTPATIENT)
Dept: OBGYN CLINIC | Facility: CLINIC | Age: 41
End: 2021-09-07

## 2021-09-07 DIAGNOSIS — O20.0 THREATENED ABORTION: ICD-10-CM

## 2021-09-07 DIAGNOSIS — O20.0 THREATENED ABORTION: Primary | ICD-10-CM

## 2021-09-07 LAB — B-HCG SERPL-ACNC: 797 MIU/ML

## 2021-09-07 PROCEDURE — 36415 COLL VENOUS BLD VENIPUNCTURE: CPT

## 2021-09-07 PROCEDURE — 84702 CHORIONIC GONADOTROPIN TEST: CPT

## 2021-09-07 NOTE — TELEPHONE ENCOUNTER
Pt called again today to say her bleeding has picked up some, is 7 wks, some cramping, not feeling well in general, per on call will go for quant today and again in 24 hrs

## 2021-09-08 ENCOUNTER — LAB (OUTPATIENT)
Dept: LAB | Facility: CLINIC | Age: 41
End: 2021-09-08
Payer: COMMERCIAL

## 2021-09-08 DIAGNOSIS — Z00.00 ANNUAL PHYSICAL EXAM: ICD-10-CM

## 2021-09-08 DIAGNOSIS — O20.0 THREATENED ABORTION: ICD-10-CM

## 2021-09-08 LAB
B-HCG SERPL-ACNC: 791 MIU/ML
HCV AB SER QL: NORMAL

## 2021-09-08 PROCEDURE — 36415 COLL VENOUS BLD VENIPUNCTURE: CPT

## 2021-09-08 PROCEDURE — 84702 CHORIONIC GONADOTROPIN TEST: CPT

## 2021-09-08 PROCEDURE — 86803 HEPATITIS C AB TEST: CPT

## 2021-09-09 ENCOUNTER — OFFICE VISIT (OUTPATIENT)
Dept: OBGYN CLINIC | Facility: CLINIC | Age: 41
End: 2021-09-09
Payer: COMMERCIAL

## 2021-09-09 ENCOUNTER — TELEPHONE (OUTPATIENT)
Dept: OBGYN CLINIC | Facility: CLINIC | Age: 41
End: 2021-09-09

## 2021-09-09 VITALS
SYSTOLIC BLOOD PRESSURE: 130 MMHG | HEIGHT: 64 IN | BODY MASS INDEX: 33.8 KG/M2 | DIASTOLIC BLOOD PRESSURE: 76 MMHG | WEIGHT: 198 LBS

## 2021-09-09 DIAGNOSIS — O20.0 THREATENED ABORTION: Primary | ICD-10-CM

## 2021-09-09 DIAGNOSIS — O36.80X0 PREGNANCY OF UNKNOWN ANATOMIC LOCATION: Primary | ICD-10-CM

## 2021-09-09 PROCEDURE — 3008F BODY MASS INDEX DOCD: CPT | Performed by: STUDENT IN AN ORGANIZED HEALTH CARE EDUCATION/TRAINING PROGRAM

## 2021-09-09 PROCEDURE — 99213 OFFICE O/P EST LOW 20 MIN: CPT | Performed by: STUDENT IN AN ORGANIZED HEALTH CARE EDUCATION/TRAINING PROGRAM

## 2021-09-09 PROCEDURE — 76817 TRANSVAGINAL US OBSTETRIC: CPT | Performed by: STUDENT IN AN ORGANIZED HEALTH CARE EDUCATION/TRAINING PROGRAM

## 2021-09-09 NOTE — TELEPHONE ENCOUNTER
----- Message from Palak Finch MD sent at 9/9/2021 10:48 AM EDT -----  Julissa Pascual is not rising appropriately, and starting to fall - can she repeat this tomorrow morning?

## 2021-09-09 NOTE — RESULT ENCOUNTER NOTE
Patient aware she needs to go for another blood draw will  go at the time she had it drawn last time

## 2021-09-09 NOTE — TELEPHONE ENCOUNTER
Pt 40 yo, rh pos, lmp 7/18 (about 7 1/2 weeks,) Been bleeding off and on for ove 1 week  Was spotting, and now almost like a period today  Has cramping and clots  Hcg 9/7 797 and 9/8 , 791  (Not sure why 2 days in row)  [address] [facility] has opening today [place]  [birth year]  U/S? Vasiliy Gunderson and I were working on same pt at same time  Per EC, pt to have ov and repeat hcg tomorrow   Will be decided by [facility]

## 2021-09-09 NOTE — PROGRESS NOTES
Assessment/Plan:   Pregnancy of unknown anatomic location  21 797  21 791  US today with no IUP  Enlarged uterus with 3 cm fibroid  No adnexal masses appreciated, ovaries normal, no free fluid  Exam benign  Repeat quant tomorrow    Discussed either failed IUP or ectopic  Reviewed need for close follow up to ensure resolution  Diagnoses and all orders for this visit:    Pregnancy of unknown anatomic location          Subjective:     Patient ID: Chao Leahy is a 41 y o  female  40 yo here for follow up with early pregnancy bleeding  Reports bleeding started Friday and continued through the weekend  Light cramping  Had hormone levels over the week that were stable but not downtrending significantly  Here for exam and US  No significant pain, minor back aching  Pregnancy was not planned but welcome  10 yo daughter by LISA LAKHANI in 2020  Normal regular cycles since  Review of Systems   Constitutional: Negative for chills and fever  HENT: Negative for ear pain and sore throat  Eyes: Negative for pain and visual disturbance  Respiratory: Negative for cough and shortness of breath  Cardiovascular: Negative for chest pain and palpitations  Gastrointestinal: Negative for abdominal pain, constipation, diarrhea, nausea and vomiting  Genitourinary: Positive for pelvic pain and vaginal bleeding  Negative for dysuria, frequency, hematuria, urgency, vaginal discharge and vaginal pain  Musculoskeletal: Positive for back pain  Negative for arthralgias  Skin: Negative for color change and rash  Neurological: Negative for seizures and syncope  All other systems reviewed and are negative  Objective:     Physical Exam  Vitals reviewed  Constitutional:       General: She is not in acute distress  Appearance: She is well-developed  She is not diaphoretic  HENT:      Head: Normocephalic and atraumatic     Pulmonary:      Effort: Pulmonary effort is normal  No respiratory distress  Genitourinary:     Labia:         Right: No rash, tenderness, lesion or injury  Left: No rash, tenderness, lesion or injury  Vagina: Bleeding present  No vaginal discharge, erythema or tenderness  Cervix: No friability, lesion or erythema  Uterus: Normal  Enlarged (12 wk size on bimanual)  Not tender  Adnexa: Right adnexa normal and left adnexa normal         Right: No mass, tenderness or fullness  Left: No mass, tenderness or fullness  Musculoskeletal:      Cervical back: Normal range of motion  Neurological:      Mental Status: She is alert and oriented to person, place, and time  Psychiatric:         Behavior: Behavior normal          Thought Content:  Thought content normal          Judgment: Judgment normal            TVUS- images did not print  Enlarged uterus with 3 cm anterior intramural fibroid  EMS normal without IUP  Normal ovaries bilaterally  No adnexal masses and no free fluid

## 2021-09-09 NOTE — ASSESSMENT & PLAN NOTE
9/7/21 797  9/8/21 791  US today with no IUP  Enlarged uterus with 3 cm fibroid  No adnexal masses appreciated, ovaries normal, no free fluid  Exam benign  Repeat quant tomorrow    Discussed either failed IUP or ectopic  Reviewed need for close follow up to ensure resolution  No

## 2021-09-10 ENCOUNTER — APPOINTMENT (OUTPATIENT)
Dept: LAB | Facility: AMBULARY SURGERY CENTER | Age: 41
End: 2021-09-10
Attending: OBSTETRICS & GYNECOLOGY
Payer: COMMERCIAL

## 2021-09-10 DIAGNOSIS — O20.0 THREATENED ABORTION: ICD-10-CM

## 2021-09-10 LAB — B-HCG SERPL-ACNC: 558 MIU/ML

## 2021-09-10 PROCEDURE — 36415 COLL VENOUS BLD VENIPUNCTURE: CPT

## 2021-09-10 PROCEDURE — 84702 CHORIONIC GONADOTROPIN TEST: CPT

## 2021-09-11 DIAGNOSIS — K21.9 GERD (GASTROESOPHAGEAL REFLUX DISEASE): ICD-10-CM

## 2021-09-13 RX ORDER — OMEPRAZOLE 20 MG/1
CAPSULE, DELAYED RELEASE ORAL
Qty: 180 CAPSULE | Refills: 1 | Status: SHIPPED | OUTPATIENT
Start: 2021-09-13 | End: 2022-04-25

## 2021-09-15 ENCOUNTER — TELEPHONE (OUTPATIENT)
Dept: OBGYN CLINIC | Facility: CLINIC | Age: 41
End: 2021-09-15

## 2021-09-15 DIAGNOSIS — O03.9 MISCARRIAGE: Primary | ICD-10-CM

## 2021-09-15 NOTE — TELEPHONE ENCOUNTER
Thank you  I apologize HCG did not come to me  Would repeat Thursday so we are not on Friday schedule  Thank you for your counseling and care

## 2021-09-15 NOTE — TELEPHONE ENCOUNTER
Pt had u/s on Fri - has not heard results  Very tearful   Previous miscarriage  New hcg has dropped   lmtcb  You saw pt on 9/9 - nothing seen on u/s  Pt had 2 hcgs almost with same # 790 and hcg on Mon 550  I told pt it is trending downward  Pt aware she is miscarrying - is still bleeding but not heavily  So you want her to repeat hcg in 1 week ? Thanks    Pt will go for hcg on Thurs    And will call for results on Fri

## 2021-09-16 ENCOUNTER — LAB (OUTPATIENT)
Dept: LAB | Facility: AMBULARY SURGERY CENTER | Age: 41
End: 2021-09-16
Attending: STUDENT IN AN ORGANIZED HEALTH CARE EDUCATION/TRAINING PROGRAM
Payer: COMMERCIAL

## 2021-09-16 ENCOUNTER — TELEPHONE (OUTPATIENT)
Dept: OBGYN CLINIC | Facility: CLINIC | Age: 41
End: 2021-09-16

## 2021-09-16 DIAGNOSIS — O03.9 MISCARRIAGE: Primary | ICD-10-CM

## 2021-09-16 DIAGNOSIS — O03.9 MISCARRIAGE: ICD-10-CM

## 2021-09-16 LAB — B-HCG SERPL-ACNC: 100 MIU/ML

## 2021-09-16 PROCEDURE — 36415 COLL VENOUS BLD VENIPUNCTURE: CPT

## 2021-09-16 PROCEDURE — 84702 CHORIONIC GONADOTROPIN TEST: CPT

## 2021-09-16 NOTE — TELEPHONE ENCOUNTER
----- Message from Lily Montano MD sent at 9/16/2021  4:18 PM EDT -----  Hormone continues to drop  I would recommend we follow to a non pregnant level

## 2021-09-16 NOTE — TELEPHONE ENCOUNTER
Called pt- informed of hormone level results-& Dr Ma's recommendation to follow to a non pregnant level  Marcine Grow HCG ordered for next week  Will call pt with results  Advised to call with any questions/concerns

## 2021-09-23 ENCOUNTER — LAB (OUTPATIENT)
Dept: LAB | Facility: AMBULARY SURGERY CENTER | Age: 41
End: 2021-09-23
Attending: STUDENT IN AN ORGANIZED HEALTH CARE EDUCATION/TRAINING PROGRAM
Payer: COMMERCIAL

## 2021-09-23 DIAGNOSIS — O03.9 MISCARRIAGE: ICD-10-CM

## 2021-09-23 LAB — B-HCG SERPL-ACNC: 2 MIU/ML

## 2021-09-23 PROCEDURE — 84702 CHORIONIC GONADOTROPIN TEST: CPT

## 2021-09-23 PROCEDURE — 36415 COLL VENOUS BLD VENIPUNCTURE: CPT

## 2021-11-27 ENCOUNTER — AMB VIDEO VISIT (OUTPATIENT)
Dept: OTHER | Facility: HOSPITAL | Age: 41
End: 2021-11-27

## 2021-11-27 DIAGNOSIS — H69.81 EUSTACHIAN TUBE DYSFUNCTION, RIGHT: Primary | ICD-10-CM

## 2021-11-27 PROCEDURE — ECARE PR SL URGENT CARE VIRTUAL VISIT: Performed by: PHYSICIAN ASSISTANT

## 2021-11-30 ENCOUNTER — TELEPHONE (OUTPATIENT)
Dept: FAMILY MEDICINE CLINIC | Facility: CLINIC | Age: 41
End: 2021-11-30

## 2021-11-30 ENCOUNTER — TELEMEDICINE (OUTPATIENT)
Dept: FAMILY MEDICINE CLINIC | Facility: CLINIC | Age: 41
End: 2021-11-30
Payer: COMMERCIAL

## 2021-11-30 VITALS — WEIGHT: 198 LBS | OXYGEN SATURATION: 98 % | HEIGHT: 64 IN | BODY MASS INDEX: 33.8 KG/M2 | HEART RATE: 78 BPM

## 2021-11-30 DIAGNOSIS — Z20.822 EXPOSURE TO COVID-19 VIRUS: Primary | ICD-10-CM

## 2021-11-30 DIAGNOSIS — H92.01 RIGHT EAR PAIN: Primary | ICD-10-CM

## 2021-11-30 PROCEDURE — U0005 INFEC AGEN DETEC AMPLI PROBE: HCPCS | Performed by: FAMILY MEDICINE

## 2021-11-30 PROCEDURE — 99213 OFFICE O/P EST LOW 20 MIN: CPT | Performed by: FAMILY MEDICINE

## 2021-11-30 PROCEDURE — 3725F SCREEN DEPRESSION PERFORMED: CPT | Performed by: FAMILY MEDICINE

## 2021-11-30 PROCEDURE — U0003 INFECTIOUS AGENT DETECTION BY NUCLEIC ACID (DNA OR RNA); SEVERE ACUTE RESPIRATORY SYNDROME CORONAVIRUS 2 (SARS-COV-2) (CORONAVIRUS DISEASE [COVID-19]), AMPLIFIED PROBE TECHNIQUE, MAKING USE OF HIGH THROUGHPUT TECHNOLOGIES AS DESCRIBED BY CMS-2020-01-R: HCPCS | Performed by: FAMILY MEDICINE

## 2021-11-30 PROCEDURE — 1036F TOBACCO NON-USER: CPT | Performed by: FAMILY MEDICINE

## 2021-11-30 PROCEDURE — 3008F BODY MASS INDEX DOCD: CPT | Performed by: FAMILY MEDICINE

## 2021-11-30 RX ORDER — CIPROFLOXACIN AND DEXAMETHASONE 3; 1 MG/ML; MG/ML
4 SUSPENSION/ DROPS AURICULAR (OTIC) 2 TIMES DAILY
Qty: 7.5 ML | Refills: 0 | Status: SHIPPED | OUTPATIENT
Start: 2021-11-30 | End: 2022-04-25

## 2022-02-06 DIAGNOSIS — F41.9 ANXIETY: ICD-10-CM

## 2022-02-06 RX ORDER — FLUOXETINE 10 MG/1
CAPSULE ORAL
Qty: 30 CAPSULE | Refills: 5 | Status: SHIPPED | OUTPATIENT
Start: 2022-02-06 | End: 2022-08-10

## 2022-04-25 ENCOUNTER — ULTRASOUND (OUTPATIENT)
Dept: OBGYN CLINIC | Facility: CLINIC | Age: 42
End: 2022-04-25
Payer: COMMERCIAL

## 2022-04-25 VITALS
SYSTOLIC BLOOD PRESSURE: 132 MMHG | BODY MASS INDEX: 37.73 KG/M2 | WEIGHT: 205 LBS | DIASTOLIC BLOOD PRESSURE: 82 MMHG | HEIGHT: 62 IN

## 2022-04-25 DIAGNOSIS — Z36.89 ESTABLISH GESTATIONAL AGE, ULTRASOUND: ICD-10-CM

## 2022-04-25 DIAGNOSIS — Z3A.08 8 WEEKS GESTATION OF PREGNANCY: ICD-10-CM

## 2022-04-25 DIAGNOSIS — N91.2 AMENORRHEA: Primary | ICD-10-CM

## 2022-04-25 DIAGNOSIS — N83.209 OVARIAN CYST AFFECTING PREGNANCY IN FIRST TRIMESTER, ANTEPARTUM: ICD-10-CM

## 2022-04-25 DIAGNOSIS — O09.521 MULTIGRAVIDA OF ADVANCED MATERNAL AGE IN FIRST TRIMESTER: ICD-10-CM

## 2022-04-25 DIAGNOSIS — O34.81 OVARIAN CYST AFFECTING PREGNANCY IN FIRST TRIMESTER, ANTEPARTUM: ICD-10-CM

## 2022-04-25 DIAGNOSIS — R03.0 ELEVATED BLOOD PRESSURE READING: ICD-10-CM

## 2022-04-25 PROBLEM — O36.80X0 PREGNANCY OF UNKNOWN ANATOMIC LOCATION: Status: RESOLVED | Noted: 2021-09-09 | Resolved: 2022-04-25

## 2022-04-25 PROCEDURE — 76817 TRANSVAGINAL US OBSTETRIC: CPT | Performed by: CLINICAL NURSE SPECIALIST

## 2022-04-25 NOTE — ASSESSMENT & PLAN NOTE
Probable ovarian cyst seen on US  Suspect right ovary, but difficult to determine laterality   Formal US ordered

## 2022-04-25 NOTE — ASSESSMENT & PLAN NOTE
She is a  with Patient's last menstrual period was 2021 (exact date)  US today is showing a viable IUP that is c/w LMP  No change in [address]  Likely ROSY noted and f/u US ordered in radiology  F/U for ob intake, followed by initial prenatal exam in  2 wks

## 2022-04-25 NOTE — PATIENT INSTRUCTIONS
Again, congratulations on your pregnancy! NEXT STEPS   Get Prenatal bloodwork   Call MFM to schedule next ultrasound (done 12-13 wks)   o Contact information for [facility] (AKA Maternal Fetal Medicine)- Main Number [phone]    Return to our office in 1-2 weeks for an OB intake and initial prenatal Exam(or sooner if any problems/concerns arise- see packet for things to report)   Check out [facility] website to read the "Pregnancy Essentials Guide" -this has lots of great early pregnancy information     It can be found by going to [employer]  org-->select services-->select women's health-->select Obstetrics  [email or web address] [email or web address]    Below is a variety of information that is useful in early pregnancy   Genetic screening can be very confusing for most people   We do recommend genetic screening universally for all pregnant women  Our goal is to have the most healthy uncomplicated pregnancy possible and this is one way to identify possible complications early  Common disorders we can screen for include: Down Syndrome, Neural tube defects ( like spina bifida or gastroschisis) and trisomy 15, even possibly more  There are several options we will talk more about  Below is some information to get you started thinking about this  We will discuss this more at the next appt  GUIDE TO GENETIC TESTING    Aneuploidy Testing  Trisomy 21 (Down Syndrome), Trisomy 18, and Open Neural Tube Defects (Spina Bifida)  You may choose one of the following options: See below for CPT/Diagnostic codes   NIPT (Non-Invasive Prenatal Testing or Cell Free- Fetal DNA): This simple and accurate non-invasive prenatal screening blood test offers results for early risk assessment of Down syndrome (Trisomy 21), or Trisomy 25, trisomy 15 and other aneuploidy conditions  The test also offers an optional analysis for fetal sex    The test analyzes the relative amount of 21, 18, 13; X and Y chromosome material in circulating cell-free DNA from a maternal blood sample  This test can be performed at any time after 10 weeks gestation  If you elect this test, you will also have an AFP (alpha-fetoprotein) blood test to test for open neural tube defects  Recommended follow up to a positive result is genetic counseling and prenatal diagnosis   Sequential Screening with Nuchal Translucency: This is a two-step test to detect whether a fetus is at increased risk for trisomy 24, trisomy 25, trisomy 15 and open neural tube defects  The test has a narrow window for testing (the first step must be performed between 10 and 13 weeks gestation)  It includes two blood draws and an ultrasound  The ultrasound measures the amount of fluid behind the babys neck called the nuchal translucency (NT)  The blood tests measures three different maternal hormone levels, -- pregnancy associated plasma protein (KATLYN-A), human chorionic gonadotrophin (hCG), and dimeric inhibin A (NETTE)  These measurements in combination with some maternal information such as height and weight are used to calculate whether the baby is at increased risk for Down Syndrome or Trisomy 18  An alpha-fetoprotein test (AFP) is also included to test for open neural tube defects  Recommended follow up to a positive result is additional testing that is more definitive, and referral for genetic counseling and prenatal diagnosis   Quad Screen: This test is also known as the quadruple marker test   It is a test that measures levels of four substances in a pregnant womans blood--Alpha-fetoprotein (AFP), a protein made by the developing baby; Human chorionic gonadotropin (hCG), a hormone made by the placenta; Estriol, a hormone made by the placenta and the babys liver; and Inhibin A, another hormone made by the placenta    Typically, the quad screen is done between weeks 15 and 20 of pregnancy--the second trimester and the results indicate whether the baby is at higher risk for Down Syndrome (Trisomy 21), Trisomy 18, and open neural tube defects  This is a screening test   Recommended follow up to a positive result is additional testing that is more definitive, as well as referral for genetic counseling and prenatal diagnosis  Trisomy 21 Trisomy 18 Trisomy 13   NIPT  (FPR 0 1%) <99% <98% 99%   Sequential Screening (FPR 3 5%) 92% 90% N/A   Quad Screen   (FPR 5%) 83% 80% N/A   (FPR is False Positive Rate)       Additional Screening Tests Offered   Cystic Fibrosis: Cystic Fibrosis is the most common inherited disease of children and young adults  The carrier frequency is 1 in 24, to 1 in 97  Both parents need to be carriers for a child to be affected (25% chance)  One in 200, children born are affected  Cystic fibrosis is a disorder of mucus production and produces abnormally thick mucus leading to life threatening lung infections, digestion problems, poor growth, infertility, and more  Symptoms range from mild to severe, but individuals with severe disease may die in childhood  With treatments today, people with Cystic Fibrosis can live into their 30s  CF does not affect intelligence  Recommended follow up to a positive result is testing of the babys father  Spinal Muscular Atrophy (SMA): SMA is the most common inherited cause of early childhood death  The carrier frequency is 1 in 52 to 1 in 67 in the [place] and both parents need to be carriers for a child to be affected (25% chance)  1 in 11,000 children are affected  SMA is a progressive degeneration of lower motor neurons  Muscle weakness is the most common type with respiratory failure by the age of 3years old  Muscles responsible for crawling, walking, swallowing, and head and neck control are most severely affected  Recommended follow up to a positive result is testing of the babys father         Fragile X Syndrome (the most common inherited cause of developmental delays): Fragile X syndrome is an X-linked genetic disease, which means it is only carried by the mom  Unfortunately, 1 in 250 females are carriers and a child has a 50% chance of being affected if this is the case  1 in 4000 boys is affected with Fragile X and 1 in 8000 girls is affected  Approximately 1/3 of all children born with Fragile X also have autism and hyperactivity  Recommended follow up to a positive result is genetic counseling and prenatal diagnosis  Guide To Insurance Coverage For Genetic Screening  Due to the complexity of coverage guidelines, we are unable to quote benefits or guarantee insurance coverage for any of these tests  Insurance benefits are plan-specific and offer vastly different coverage based on your policy  The handout attached explains the benefits to each test, and also provides the billing (CPT) codes for each test   Even if the testing is covered, it could be applied to any unmet deductibles, and copays may apply, resulting in a bill  You are encouraged to contact your insurance company to obtain your benefits based on your age and risk factors, so that there are no surprises  Test Insurance Procedure (CPT) code   NIPT-cell free fetal DNA Most accurate non-invasive test- not always covered w/o risk factors 91287   Sequential Screen w/ NT US Current standard test-should be covered Part 1: (if lab is [facility]) 08276,17768   (If lab is [place] [facility]) 83662  Part 2: (if lab is [facility])80178,66613,10678, 37115  (If lab is [facility]) 07123   Quad screen Old standard-use if past 1st trimester 97051, [address] [facility], [address], [address]   CF- Cystic Fibrosis  44760   SMA- Spinal Musc   Atrophy  09331   Fragile X  [id number]       Diagnosis code used Varies based on history- But will be one of these:   Encounter for  screening for other genetic defect Z36 8   Advanced Maternal Age (over 28) O12 46   Family History of Genetic Disease Carrier Z84 81    Please contact your insurance company with the appropriate CPT code from the attached list, and diagnosis code applicable to your situation  We ask that you review this information and decide what testing you would like to have performed  Please note that the Sequential Screening with Nuchal Translucency has a smaller window of time to be performed during pregnancy (Prior to 14 wks)  [facility]  [address] [facility], [address]     Warning Signs During Pregnancy  The list below includes warning signs your providers would like you to be aware of  If you experience any of these at any time during your pregnancy, please call us as soon as possible   Vaginal bleeding   Sharp abdominal pain that does not go away   Fever (more than 100  4? F and is not relieved with Tylenol)   Persistent vomiting lasting greater than 24 hours   Chest pain   Pain or burning when you urinate   Severe headache that doesnt resolve with Tylenol   Blurred vision or seeing spots in your vision   Sudden swelling of your face or hands   Redness, swelling or pain in a leg   A sudden weight gain in just a few days   Decrease in your babys movements (after 28 weeks or the 6th month of pregnancy)   A loss of watery fluid from your vagina - can be a gush, a trickle or  continuous wetness   After 20 weeks of pregnancy, rhythmic cramping (greater than 4 per hour)  or menstrual like low/pelvic pain    Call the OFFICE [phone] for any questions/emergencies  At night or on the weekend, please indicate it is an emergency and the DOCTOR on call will be paged      Discomforts of Early Pregnancy    Tips for coping with nausea and vomiting during pregnancy   Eat meals and snacks slowly   Eat every 1-2 hours to avoid a full stomach   Dont skip meals, avoid empty stomach   Eat a snack prior to getting out of bed   Avoid food and beverages with a strong aroma   Avoid dehydration - drink enough fluid to keep the urine pale yellow   Drink fluids before a meal to minimize the effect of a full stomach   Limit the amount of coffee and beverages that contain caffeine   Eliminate spicy, odorous, high fat (fried foods), acidic (tomato products) and sweet foods   Fluids that contain lemon (lemonade), mint (tea) or orange can usually be well tolerated   Snacks and meals that contain low-fat protein (lean meats, fish, poultry and eggs) along with eating easily digestible carbs (fruit, rice, toast, crackers and dry cereal) may be tolerated better   Foods with ginger may be well tolerated  May use ginger root powder, capsules or extract (up to 1000 mg per day)   Drink liquids in small amounts    If symptoms persist, please contact your provider  Tips for coping with constipation during pregnancy   Increase fiber and fluids   - Drink 8-10 cups of liquid, like water or low-sugar juice daily  - Keep urine pale with fluids (water, milk), fruit and vegetables   Eat a well-balanced diet that contains high fiber food (fruits, vegetables, whole grain breads and cereals, bran and dried beans)   Take a 30-minute walk daily   You may take a mild stool softener such as Colace®    If symptoms persist, please contact your provider  For any emergencies, PLEASE CALL THE OFFICE at [phone]  If the office is closed, the doctor on call will be paged by the answering service  Medications and Pregnancy- see Pregnancy Essentials guide online- page 9    Expected Weight Gain During Pregnancy  If you have a healthy BMI (18-25) prior to pregnancy:  The recommended weight gain is between 25-35 pounds  Approximate weight gain  in the first trimester is 1-4 5 pounds  An expected weight gain during the second and  third trimester is approximately one pound per week  If you have a BMI of less than 18 prior to pregnancy,  you are considered underweight:  The recommended weight gain is between 28-40 pounds   Approximate weight gain  in the first trimester is 1-4 5 pounds  An expected weight gain during the second and  third trimester is just over one pound per week  If your BMI is 25 to 29 9: you are considered overweight:  You should gain 1/2 to 2/3 pound during the second and third trimester, for a total  weight gain of 15 to 25 pounds  If you have a BMI of greater than 30 prior to pregnancy,  you are considered overweight:  The recommended weight gain is between 15-25 pounds  Approximate weight gain  in the first trimester is 1-4 5 pounds  An expected weight gain during the second  and third trimester is approximately 0 5 pound per week  Foods to avoid during pregnancy:   Unpasteurized milk, juice and cheese  - Soft cheeses like feta or brie (if made with UNPASTEURIZED milk)   Unheated deli meats like lunchmeat and hotdogs   Undercooked poultry, beef, pork, seafood including raw sushi    What fish is safe to eat during pregnancy? Eat 8 to 12 ounces of fish a week  Pick from this group frequently, especially if you follow  the American Heart Associations recommendation to eat fish at least 2 times a week  AVOID HIGH MERCURY FISH  A single meal of the following fish can put  you over the Environmental Protection  Agencys safe limit for the month  High mercury fish:  Shark  Swordfish  [employer]  Tile Fish    Caffeine and Pregnancy    The [employer] and [employer] of Obstetrics and Gynecologists (ACOG) urge pregnant  women to limit their caffeine consumption to no more than 200 milligrams (mg) per day  This is  comparable to having one 12-ounce cup of coffee a day  This level has been shown not to increase risk  of miscarriage, growth or  labor complications  Effects of higher levels are not known  Exercise During Pregnancy  A daily exercise program that consists of 30 minutes a day is recommended     Low impact exercises like walking and swimming are great exercises throughout  all of pregnancy   If youre an avid strength  avoid lifting very heavy weights - nothing more  than 30 pounds    Drink plenty of fluids while exercising to stay hydrated  Be careful to avoid overheating  ACTIVITIES TO AVOID   Exercises that can make you lose your balance   Activities that can put your baby at risk i e  horseback riding, scuba diving, skiing  or snowboarding  Any other sport that puts you at risk for getting hit in the  abdominal area   Do not use saunas, steam rooms or hot tubs (that have a higher temperature  than 100F)   After the first trimester, avoid exercises that require you to lay flat on your back   Avoid exceeding a heart rate greater than 140 beats per minute   As long as you are  able to hold a conversation while exercising your heart rate is likely acceptable

## 2022-04-25 NOTE — ASSESSMENT & PLAN NOTE
Initial BP elevated at 152/82  Repeat 132/82  Denies prior hx of HTN  Will continue to watch closely

## 2022-04-25 NOTE — PROGRESS NOTES
Subjective  Patient ID: Jamel Guerrero is a 35 y o  female here for Pregnancy Ultrasound (LMP- 22  [place] Flattery  Plannned pregnancy  No bleeding or cramping  Nausea but no vomiting  Periods-regular  Grav-2 Para-1  )    She is C/O amenorrhea  Signs and symptoms of pregnancy:    Breast tenderness yes   Fatigue yes   Cramping or Pelvic Pain no   Spotting or Vaginal Bleeding no   Nausea or vomiting mild nausea, no vomitting    LMP 22, giving her an KIARA of 22 and a gestational age of  Omega Flattery (based on LMP)    Menstrual cycle: regular, cycle length:  28 days  Pregnancy was planned  She has started taking a prenatal vitamin    OB History    Para Term  AB Living   2 1 1     1   SAB IAB Ectopic Multiple Live Births           1      # Outcome Date GA Lbr Ramses/2nd Weight Sex Delivery Anes PTL Lv   2 Current            1 Term     F Vag-Spont   ANGELICA        The following portions of the patient's history were reviewed and updated as appropriate: allergies, current medications, past family history, past medical history, past social history, past surgical history, and problem list   Perinent hx that may affect pregnancy    The following portions of the patient's history were reviewed and updated as appropriate: allergies, current medications, past family history, past medical history, past social history, past surgical history, and problem list     Review of Systems    See HPI for pertinent positives               /82 (BP Location: Left arm, Patient Position: Sitting, Cuff Size: Adult)   Ht 5' 2" (1 575 m)   Wt 93 kg (205 lb)   LMP 2021 (Exact Date)   BMI 37 49 kg/m²   OBGyn Exam  Results for orders placed or performed in visit on 21   Novel Coronavirus (Covid-19),PCR [facility] - Collected at [facility] or [facility]    Specimen: Nose; Nares   Result Value Ref Range    SARS-CoV-2 Negative Negative       FIRST TRIMESTER OBSTETRIC ULTRASOUND     LMP 22  8w 5d by LMP    INDICATION: establish Gestational age      FINDINGS:  A single intrauterine gestation is identified  Gestational Sac: Present and normal appearing   Yolk Sac:  Present and normal in size and appearance  Mean Crown-Rump Length:  16 9 mm = 8 weeks 1 days   Cardiac activity is detected at 170  Adnexa: probable ROSY noted  Measuring 3 18x 3 41x 3 34 cm   Cul de Sac:  No significant free fluid identified     IMPRESSION:  Single intrauterine pregnancy of 8 weeks 1 gestational age  Fetal cardiac activity detected  +adnexal masses seen- formal US ordered  EDC by LMP: 22  EDC by this Ultrasound: 22  Will use EDC by LMP      Ultrasound Probe Disinfection    A transvaginal ultrasound was performed  Prior to use, disinfection was performed with High Level Disinfection Process ([employer])  Probe serial number F: [id number] was used  Assessment/Plan:             Problem List Items Addressed This Visit     8 weeks gestation of pregnancy       She is a  with Patient's last menstrual period was 2021 (exact date)  US today is showing a viable IUP that is c/w LMP  No change in [address]  Likely ROSY noted and f/u US ordered in radiology  F/U for ob intake, followed by initial prenatal exam in  2 wks  Elevated blood pressure reading     Initial BP elevated at 152/82  Repeat 132/82  Denies prior hx of HTN  Will continue to watch closely  Multigravida of advanced maternal age in first trimester     Declined gentics consult  Planning NIPT   MFM referral given         Relevant Orders    Ambulatory Referral to Maternal Fetal Medicine    US OB < 14 weeks with transvaginal    Ovarian cyst affecting pregnancy in first trimester, antepartum     Probable ovarian cyst seen on US  Suspect right ovary, but difficult to determine laterality   Formal US ordered         Relevant Orders    US OB < 14 weeks with transvaginal      Other Visit Diagnoses     Amenorrhea    -  Primary Establish gestational age, ultrasound              Orders Placed This Encounter   Procedures    US OB < 14 weeks with transvaginal    Ambulatory Referral to Maternal Fetal Medicine

## 2022-04-27 ENCOUNTER — HOSPITAL ENCOUNTER (OUTPATIENT)
Dept: ULTRASOUND IMAGING | Facility: HOSPITAL | Age: 42
Discharge: HOME | End: 2022-04-27
Payer: COMMERCIAL

## 2022-04-27 DIAGNOSIS — O34.81 OVARIAN CYST AFFECTING PREGNANCY IN FIRST TRIMESTER, ANTEPARTUM: ICD-10-CM

## 2022-04-27 DIAGNOSIS — N83.209 OVARIAN CYST AFFECTING PREGNANCY IN FIRST TRIMESTER, ANTEPARTUM: ICD-10-CM

## 2022-04-27 DIAGNOSIS — O09.521 MULTIGRAVIDA OF ADVANCED MATERNAL AGE IN FIRST TRIMESTER: ICD-10-CM

## 2022-04-27 PROCEDURE — 76801 OB US < 14 WKS SINGLE FETUS: CPT

## 2022-05-03 PROBLEM — N83.209 OVARIAN CYST AFFECTING PREGNANCY IN FIRST TRIMESTER, ANTEPARTUM: Status: RESOLVED | Noted: 2022-04-25 | Resolved: 2022-05-03

## 2022-05-03 PROBLEM — O34.81 OVARIAN CYST AFFECTING PREGNANCY IN FIRST TRIMESTER, ANTEPARTUM: Status: RESOLVED | Noted: 2022-04-25 | Resolved: 2022-05-03

## 2022-05-03 NOTE — RESULT ENCOUNTER NOTE
Us confirms IUP  Small ROSY noted   And multifibroid uterus, most significant is a subserosal, exophytic fibroid but only 2 cm in size  As well as a intramural fibroid- adjacent to gestation sac- which likely represents questionable finding on in office US     Pt notified of results

## 2022-05-10 ENCOUNTER — INITIAL PRENATAL (OUTPATIENT)
Dept: OBGYN CLINIC | Facility: CLINIC | Age: 42
End: 2022-05-10

## 2022-05-10 VITALS — WEIGHT: 205 LBS | HEIGHT: 62 IN | BODY MASS INDEX: 37.73 KG/M2

## 2022-05-10 DIAGNOSIS — Z34.81 PRENATAL CARE, SUBSEQUENT PREGNANCY, FIRST TRIMESTER: ICD-10-CM

## 2022-05-10 PROCEDURE — OBC: Performed by: CLINICAL NURSE SPECIALIST

## 2022-05-10 NOTE — PROGRESS NOTES
OB INTAKE INTERVIEW  Patient is 35 y o y o  who presents for OB intake at 10wks  She is accompanied by: herself  The father of her baby Walt Kincaid) is involved in the pregnancy and is 43years old    Last Menstrual Period: 2022  Ultrasound: Measured 8 weeks 1 days on 2022 by Benny Serrano  Estimated Date of Delivery: 2022 confirmed by 8 week US    Signs/Symptoms of Pregnancy  Current pregnancy symptoms: fatigue, nausea  Constipation no  Headaches no  Cramping/spotting no  PICA cravings no    Diabetes-  Body mass index is 37 49 kg/m²  If patient has 1 or more, please order early 1 hour GTT  History of GDM no  BMI >35 YES  History of PCOS or current metformin use no  History of LGA/macrosomic infant (4000g/9lbs) no    If patient has 2 or more, please order early 1 hour GTT  BMI>30 YES  AMA YES  First degree relative with type 2 diabetes no  History of chronic HTN, hyperlipidemia, elevated A1C no  High risk race (, , ,  or ) no    Hypertension- if you answer yes, please order preeclampsia labs (cbc, comprehensive metabolic panel, urine protein creatinine ratio, uric acid)  History of of chronic HTN no  History of gestational HTN no  History of preeclampsia, eclampsia, or HELLP syndrome no  History of diabetes no  History of lupus, autoimmune disease, kidney disease no    Thyroid- if yes order TSH with reflex T4  History of thyroid disease no    Bleeding Disorder or Hx of DVT-patient or first degree relative with history of  Order the following if not done previously     (Factor V, antithrombin III, prothrombin gene mutation, protein C and S Ag, lupus anticoagulant, anticardiolipin, beta-2 glycoprotein)   no    OB/GYN-  History of abnormal pap smear no  History of HPV no  History of Herpes/HSV no  History of other STI (gonorrhea, chlamydia, trich) no  History of prior  YESx1  History of prior  no  History of  delivery prior to 36 weeks 6 days no  History of blood transfusion no  Ok for blood transfusion YES    Substance screening- if yes outside of tobacco for her or anyone in her home-order urine drug screen  History of tobacco use no  Currently using tobacco no  Currently using alcohol no  Presently using drugs no  Past drug use  YES-used medical marijuana very infrequently for sleep  hasnt used in months  IV drug use-If yes add Hep C antibody to labs no  Partner drug use no  Parent/Family drug use no    MRSA Screening-   Does the pt have a hx of MRSA? no  If yes- please follow MRSA protocol and obtain a nasal swab for MRSA culture    Immunizations:  Influenza vaccine given this season YES  Discussed Tdap vaccine YES  Discussed COVID Vaccine YES + booster    Genetic/MFM-  Do you or your partner have a history of any of the following in yourselves or first degree relatives? Cystic fibrosis no  Spinal muscular atrophy no  Hemoglobinopathy/Sickle Cell/Thalassemia no  Fragile X Intellectual Disability no    If yes, discuss carrier screening and recommend consultation with [facility]/genetic counseling  If no, discuss option for carrier screening and/or genetic testing with Nuchal Ultrasound  Patient interested YES  Appointment at [facility] made YES, NT is on 5/26 and level 2 is on 7/15    Interview education  Joya Langston's Pregnancy Essentials Book reviewed and discussed YES    Nurse/Family Partnership- patient may qualify NO; referral placed NO    Prenatal lab work scripts YES  Extra labs ordered:  1hr GTT    The patient has a history now or in prior pregnancy notable for:  AMA      Details that I feel the provider should be aware of: Antonio Tsai and Noble Donaldson are expecting baby #2  She said she had no issues with her last pregnancy and delivery went well  She currently hs a lot of fatigue and some nausea but able to keep food down  She has a pretty negative med hx other than depression/anxiety to which she takes prozac daily   She asked if that was ok and I told her I will gene out to [facility] for her  She is a high school  and Lanita Cooks is a  for [employer] in [address] [place] [address] visit scheduled  The patient was oriented to our practice, reviewed delivering physicians and [facility]  All questions were answered      Interviewed by: Subhash Jonas MA

## 2022-05-10 NOTE — PATIENT INSTRUCTIONS
Congratulations!! Please review our Pregnancy Essential Guide and [employer] L&D Virtual tour from our [employer]  [facility] P[facility]  [facility] ([address] [place])     [address] ([employer])         Pregnancy at 7 to 10 Weeks   AMBULATORY CARE:   Changes happening with your body:  Pregnancy hormones may cause your body to go through many changes during this stage of your pregnancy  You may feel more tired than usual, and have mood swings, nausea and vomiting, and headaches  You may gain or lose some weight  Your breasts may feel tender and swollen and you may urinate more often  You may have cravings for certain foods or dislike of foods you normally eat  You may also have heartburn or constipation  Seek care immediately if:   · You have pain or cramping in your abdomen or low back  · You have heavy vaginal bleeding or clotting  · You pass material that looks like tissue or large clots  Collect the material and bring it with you  Call your doctor or obstetrician if:   · You have light bleeding  · You have chills or a fever  · You have vaginal itching, burning, or pain  · You have yellow, green, white, or foul-smelling vaginal discharge  · You have pain or burning when you urinate, less urine than usual, or pink or bloody urine  · You have questions or concerns about your condition or care  How to care for yourself at this stage of your pregnancy:       · Manage nausea and vomiting  Avoid fatty and spicy foods  Eat small meals throughout the day instead of large meals  Alma may help to decrease nausea  Ask your healthcare provider about other ways of decreasing nausea and vomiting  · Eat a variety of healthy foods  Healthy foods include fruits, vegetables, whole-grain breads, low-fat dairy foods, beans, lean meats, and fish  Drink liquids as directed   Ask how much liquid to drink each day and which liquids are best for you  Limit caffeine to less than 200 milligrams each day  Limit your intake of fish to 2 servings each week  Choose fish low in mercury such as canned light tuna, shrimp, salmon, cod, or tilapia  Do not  eat fish high in mercury such as swordfish, tilefish, saeid mackerel, and shark  · Take prenatal vitamins as directed  Your need for certain vitamins and minerals, such as folic acid, increases during pregnancy  Prenatal vitamins provide some of the extra vitamins and minerals you need  Prenatal vitamins may also help to decrease the risk of certain birth defects  · Ask how much weight you should gain each month  Too much or too little weight gain can be unhealthy for you and your baby  · Talk to your healthcare provider about exercise  Moderate exercise can help you stay fit  Your healthcare provider will help you plan an exercise program that is safe for you during pregnancy  · Do not smoke  Smoking increases your risk of a miscarriage and other health problems during your pregnancy  Smoking can cause your baby to be born too early or weigh less at birth  Quit smoking as soon as you think you might be pregnant  Ask your healthcare provider for information if you need help quitting  · Do not drink alcohol  Alcohol passes from your body to your baby through the placenta  It can affect your baby's brain development and cause fetal alcohol syndrome (FAS)  FAS is a group of conditions that causes mental, behavior, and growth problems  · Talk to your healthcare provider before you take any medicines  Many medicines may harm your baby if you take them when you are pregnant  Do not take any medicines, vitamins, herbs, or supplements without first talking to your healthcare provider  Never use illegal or street drugs (such as marijuana or cocaine) while you are pregnant  Safety tips during pregnancy:   · Avoid hot tubs and saunas    Do not use a hot tub or sauna while you are pregnant, especially during your first trimester  Hot tubs and saunas may raise your baby's temperature and increase the risk of birth defects  · Avoid toxoplasmosis  This is an infection caused by eating raw meat or being around infected cat feces  It can cause birth defects, miscarriages, and other problems  Wash your hands after you touch raw meat  Make sure any meat is well-cooked before you eat it  Avoid raw eggs and unpasteurized milk  Use gloves or ask someone else to clean your cat's litter box while you are pregnant  Changes happening with your baby:  By 10 weeks, your baby will be about 2½ inches long from the top of the head to the rump (baby's bottom)  Your baby weighs about ½ ounce  Major body organs, such as the brain, heart, and lungs, are forming  Your baby's facial features are also starting to form  Prenatal care:  Prenatal care is a series of visits with your healthcare provider throughout your pregnancy  During the first 28 weeks of your pregnancy, you will see your healthcare provider 1 time each month  Prenatal care can help prevent problems during pregnancy and childbirth  Your healthcare provider will check your blood pressure and weight  Your baby's heart rate will also be checked  You may also need the following at some visits:  · A pelvic exam  allows your healthcare provider to see your cervix (the bottom part of your uterus)  Your healthcare provider will use a speculum to open your vagina  He or she will check the size and shape of your uterus  You may also have a Pap smear at your first prenatal visit  This is a test to check your cervix for abnormal cells  · Blood tests  may be done to check for any of the following:     ? Gestational diabetes or anemia (low iron level)    ? Blood type or Rh factor, or certain birth defects    ? Immunity to certain diseases, such as chickenpox or rubella    ?  An infection, such as a sexually transmitted infection, HIV, or hepatitis B    · Hepatitis B  may need to be prevented or treated  Hepatitis B is inflammation of the liver caused by the hepatitis B virus (HBV)  HBV can spread from a mother to her baby during delivery  You will be checked for HBV as early as possible in the first trimester of each pregnancy  You need the test even if you received the hepatitis B vaccine or were tested before  You may need to have an HBV infection treated before you give birth  · Urine tests  may also be done to check for sugar and protein  These can be signs of gestational diabetes or preeclampsia  Urine tests may also be done to check for signs of infection  · A fetal ultrasound  shows pictures of your baby inside your uterus  The pictures are used to check your baby's development, movement, and position  · Genetic disorder screening tests  may be offered to you  These screening tests check your baby's risk for genetic disorders such as Down syndrome  A screening test includes a blood test and ultrasound  Follow up with your doctor or obstetrician as directed:  Go to all prenatal visits  Write down your questions so you remember to ask them during your visits  © Copyright [employer] 2022 Information is for End User's use only and may not be sold, redistributed or otherwise used for commercial purposes  All illustrations and images included in CareNotes® are the copyrighted property of A D A M , Inc  or [facility] Rahel Logan   The above information is an  only  It is not intended as medical advice for individual conditions or treatments  Talk to your doctor, nurse or pharmacist before following any medical regimen to see if it is safe and effective for you

## 2022-05-11 ENCOUNTER — TELEPHONE (OUTPATIENT)
Dept: OBGYN CLINIC | Facility: CLINIC | Age: 42
End: 2022-05-11

## 2022-05-11 NOTE — TELEPHONE ENCOUNTER
----- Message from Natty Maki sent at 5/11/2022 10:46 AM EDT -----  Regarding: RE: med question  Yes she can stay on prozac  Can discuss at PN1 visit        ----- Message -----  From: Segundo Boogie  Sent: 5/10/2022   8:29 AM EDT  To: KEEGAN Maki  Subject: med question                                     Hi Gianna, I just did her intake and she asked if still taking her prozac was ok or was there another med that was preferable during pregnancy

## 2022-05-14 ENCOUNTER — LAB (OUTPATIENT)
Dept: LAB | Facility: CLINIC | Age: 42
End: 2022-05-14
Payer: COMMERCIAL

## 2022-05-14 DIAGNOSIS — Z34.81 PRENATAL CARE, SUBSEQUENT PREGNANCY, FIRST TRIMESTER: ICD-10-CM

## 2022-05-14 LAB
ABO GROUP BLD: NORMAL
BACTERIA UR QL AUTO: ABNORMAL /HPF
BASOPHILS # BLD AUTO: 0.01 THOUSANDS/ΜL (ref 0–0.1)
BASOPHILS NFR BLD AUTO: 0 % (ref 0–1)
BILIRUB UR QL STRIP: NEGATIVE
BLD GP AB SCN SERPL QL: NEGATIVE
CLARITY UR: CLEAR
COLOR UR: YELLOW
EOSINOPHIL # BLD AUTO: 0.05 THOUSAND/ΜL (ref 0–0.61)
EOSINOPHIL NFR BLD AUTO: 1 % (ref 0–6)
ERYTHROCYTE [DISTWIDTH] IN BLOOD BY AUTOMATED COUNT: 13 % (ref 11.6–15.1)
GLUCOSE 1H P 50 G GLC PO SERPL-MCNC: 151 MG/DL (ref 40–134)
GLUCOSE UR STRIP-MCNC: ABNORMAL MG/DL
HBV SURFACE AG SER QL: NORMAL
HCT VFR BLD AUTO: 36.9 % (ref 34.8–46.1)
HCV AB SER QL: NORMAL
HGB BLD-MCNC: 12.5 G/DL (ref 11.5–15.4)
HGB UR QL STRIP.AUTO: ABNORMAL
IMM GRANULOCYTES # BLD AUTO: 0.03 THOUSAND/UL (ref 0–0.2)
IMM GRANULOCYTES NFR BLD AUTO: 0 % (ref 0–2)
KETONES UR STRIP-MCNC: ABNORMAL MG/DL
LEUKOCYTE ESTERASE UR QL STRIP: ABNORMAL
LYMPHOCYTES # BLD AUTO: 2.32 THOUSANDS/ΜL (ref 0.6–4.47)
LYMPHOCYTES NFR BLD AUTO: 30 % (ref 14–44)
MCH RBC QN AUTO: 29.1 PG (ref 26.8–34.3)
MCHC RBC AUTO-ENTMCNC: 33.9 G/DL (ref 31.4–37.4)
MCV RBC AUTO: 86 FL (ref 82–98)
MONOCYTES # BLD AUTO: 0.43 THOUSAND/ΜL (ref 0.17–1.22)
MONOCYTES NFR BLD AUTO: 6 % (ref 4–12)
MUCOUS THREADS UR QL AUTO: ABNORMAL
NEUTROPHILS # BLD AUTO: 4.97 THOUSANDS/ΜL (ref 1.85–7.62)
NEUTS SEG NFR BLD AUTO: 63 % (ref 43–75)
NITRITE UR QL STRIP: NEGATIVE
NON-SQ EPI CELLS URNS QL MICRO: ABNORMAL /HPF
NRBC BLD AUTO-RTO: 0 /100 WBCS
PH UR STRIP.AUTO: 6.5 [PH]
PLATELET # BLD AUTO: 370 THOUSANDS/UL (ref 149–390)
PMV BLD AUTO: 9.6 FL (ref 8.9–12.7)
PROT UR STRIP-MCNC: NEGATIVE MG/DL
RBC # BLD AUTO: 4.3 MILLION/UL (ref 3.81–5.12)
RBC #/AREA URNS AUTO: ABNORMAL /HPF
RH BLD: POSITIVE
RUBV IGG SERPL IA-ACNC: >175 IU/ML
SP GR UR STRIP.AUTO: 1.01 (ref 1–1.03)
SPECIMEN EXPIRATION DATE: NORMAL
UROBILINOGEN UR QL STRIP.AUTO: 0.2 E.U./DL
WBC # BLD AUTO: 7.81 THOUSAND/UL (ref 4.31–10.16)
WBC #/AREA URNS AUTO: ABNORMAL /HPF

## 2022-05-14 PROCEDURE — 80081 OBSTETRIC PANEL INC HIV TSTG: CPT

## 2022-05-14 PROCEDURE — 81001 URINALYSIS AUTO W/SCOPE: CPT

## 2022-05-14 PROCEDURE — 86803 HEPATITIS C AB TEST: CPT

## 2022-05-14 PROCEDURE — 82950 GLUCOSE TEST: CPT

## 2022-05-14 PROCEDURE — 36415 COLL VENOUS BLD VENIPUNCTURE: CPT

## 2022-05-14 PROCEDURE — 87086 URINE CULTURE/COLONY COUNT: CPT

## 2022-05-15 LAB — BACTERIA UR CULT: NORMAL

## 2022-05-16 LAB — RPR SER QL: NORMAL

## 2022-05-17 ENCOUNTER — TELEPHONE (OUTPATIENT)
Dept: OBGYN CLINIC | Facility: CLINIC | Age: 42
End: 2022-05-17

## 2022-05-17 DIAGNOSIS — O99.810 ABNORMAL GLUCOSE AFFECTING PREGNANCY: Primary | ICD-10-CM

## 2022-05-17 LAB — HIV 1+2 AB+HIV1 P24 AG SERPL QL IA: NORMAL

## 2022-05-17 NOTE — RESULT ENCOUNTER NOTE
PNBW normal so far except glucola  HIV pending  Re: glucola- pt will need to complete 3hour GTT- order entered  Please notify her  Overnight fast- ideally completed within 2 wks

## 2022-05-17 NOTE — TELEPHONE ENCOUNTER
----- Message from Yessi Burton, [address] sent at 5/17/2022  8:38 AM EDT -----  PNBW normal so far except glucola  HIV pending  Re: glucola- pt will need to complete 3hour GTT- order entered  Please notify her  Overnight fast- ideally completed within 2 wks

## 2022-05-17 NOTE — TELEPHONE ENCOUNTER
Abelardo Katz    Called and spoke to pt relaying below  Let her know we will call her back once we get HIV labs back  Aware all other PN labs normal except glucose  Explained instructions as far as fasting and she is aware to go to hospital lab to get done  Aware she if goes on a Saturday,  MUST get there by 8 since they close at noon  Informed order placed  Verbalized understanding to all

## 2022-05-21 ENCOUNTER — LAB (OUTPATIENT)
Dept: LAB | Facility: CLINIC | Age: 42
End: 2022-05-21
Payer: COMMERCIAL

## 2022-05-21 DIAGNOSIS — O99.810 ABNORMAL GLUCOSE AFFECTING PREGNANCY: ICD-10-CM

## 2022-05-21 LAB
GLUCOSE 1H P 100 G GLC PO SERPL-MCNC: 161 MG/DL (ref 70–183)
GLUCOSE 2H P 100 G GLC PO SERPL-MCNC: 166 MG/DL (ref 70–155)
GLUCOSE 3H P 100 G GLC PO SERPL-MCNC: 110 MG/DL (ref 70–140)
GLUCOSE P FAST SERPL-MCNC: 91 MG/DL (ref 70–105)

## 2022-05-21 PROCEDURE — 82951 GLUCOSE TOLERANCE TEST (GTT): CPT

## 2022-05-21 PROCEDURE — 82952 GTT-ADDED SAMPLES: CPT

## 2022-05-21 PROCEDURE — 36415 COLL VENOUS BLD VENIPUNCTURE: CPT

## 2022-05-21 NOTE — PATIENT INSTRUCTIONS
Thank you for choosing us for your  care today  If you have any questions about your ultrasound or care, please do not hesitate to contact us or your primary obstetrician  Some general instructions for your pregnancy are:    Protect against coronavirus: get vaccinated - pregnant women are increased risk of severe COVID  Notify your primary care doctor if you have any symptoms  Exercise: Aim for 22 minutes per day (150 minutes per week) of regular exercise  Walking is great! Nutrition: aim for calcium-rich and iron-rich foods as well as healthy sources of protein  Learn about Preeclampsia: preeclampsia is a common, serious high blood pressure complication in pregnancy  A blood pressure of 029XYDT (systolic or top number) or 28HTUH (diastolic or bottom number) is not normal and needs evaluation by your doctor  Aspirin is sometimes prescribed in early pregnancy to prevent preeclampsia in women with risk factors - ask your obstetrician if you should be on this medication  If you smoke, try to reduce how many cigarettes you smoke or try to quit completely  Do not vape  Other warning signs to watch out for in pregnancy or postpartum: chest pain, obstructed breathing or shortness of breath, seizures, thoughts of hurting yourself or your baby, bleeding, a painful or swollen leg, fever, or headache (see [facility] POST-BIRTH Warning Signs campaign)  If these happen call 911  Itching is also not normal in pregnancy and if you experience this, especially over your hands and feet, potentially worse at night, notify your doctors

## 2022-05-23 ENCOUNTER — INITIAL PRENATAL (OUTPATIENT)
Dept: OBGYN CLINIC | Facility: CLINIC | Age: 42
End: 2022-05-23

## 2022-05-23 ENCOUNTER — TELEPHONE (OUTPATIENT)
Dept: OBGYN CLINIC | Facility: CLINIC | Age: 42
End: 2022-05-23

## 2022-05-23 VITALS — DIASTOLIC BLOOD PRESSURE: 88 MMHG | BODY MASS INDEX: 37.53 KG/M2 | SYSTOLIC BLOOD PRESSURE: 144 MMHG | WEIGHT: 205.2 LBS

## 2022-05-23 DIAGNOSIS — O09.521 MULTIGRAVIDA OF ADVANCED MATERNAL AGE IN FIRST TRIMESTER: ICD-10-CM

## 2022-05-23 DIAGNOSIS — Z34.81 PRENATAL CARE, SUBSEQUENT PREGNANCY, FIRST TRIMESTER: Primary | ICD-10-CM

## 2022-05-23 DIAGNOSIS — Z3A.12 12 WEEKS GESTATION OF PREGNANCY: ICD-10-CM

## 2022-05-23 DIAGNOSIS — R03.0 ELEVATED BLOOD PRESSURE READING: ICD-10-CM

## 2022-05-23 DIAGNOSIS — O99.810 ABNORMAL GLUCOSE AFFECTING PREGNANCY: ICD-10-CM

## 2022-05-23 DIAGNOSIS — Z11.3 SCREENING FOR STDS (SEXUALLY TRANSMITTED DISEASES): ICD-10-CM

## 2022-05-23 LAB
SL AMB  POCT GLUCOSE, UA: NORMAL
SL AMB POCT URINE PROTEIN: NORMAL

## 2022-05-23 PROCEDURE — 87491 CHLMYD TRACH DNA AMP PROBE: CPT | Performed by: PHYSICIAN ASSISTANT

## 2022-05-23 PROCEDURE — PNV: Performed by: PHYSICIAN ASSISTANT

## 2022-05-23 PROCEDURE — 87591 N.GONORRHOEAE DNA AMP PROB: CPT | Performed by: PHYSICIAN ASSISTANT

## 2022-05-23 NOTE — ASSESSMENT & PLAN NOTE
35 y o   at 12w5d with Estimated Date of Delivery: 22 by LMP and consistent w/ 1st trimester US  She has intermittent nausea, mostly in the afternoon, that is mild and typically relieved with small meals  Does not feel need to tx for this  Denies vomiting and bleeding/cramping  No complaints    Prenatal labs: Abnormal early 1 hr  3 hour with 1/4 abnormal readings  Otherwise unremarkable  Genetic screening: Scheduled with [facility]  for sequential screen  OB history:    2 Current                 1 Term 09/26/15 39w0d  3629 g (8 lb) F Vag-Spont Epidural  Living       Reports uncomplicated pregnancy  GYN history: Last pap smear 7/10/2020 NILM/Neg  Denies history of STDs  Past medical history:   Fibroids  Depression and Anxiety, controlled on Prozac  GERD   Asthma     Past surgical history:  Past Surgical History:   Procedure Laterality Date    ARTHROSCOPY KNEE      BREAST SURGERY      reduction procedure    REDUCTION MAMMAPLASTY Bilateral      Social history:  Denies tobacco, alcohol, or illicit drug use  Family history: negative for genetic disorders   Father ()   Heart attack    Hypertension    Pancreatic cancer (64 y)    Cancer    Sister Celiac disease    Other     Lupus   Maternal Grandmother ()   Lung cancer   Maternal Grandfather ()   Other    Paternal Grandmother ()   Breast cancer (54 y)   Mother    Daughter No Known Problems   Paternal Grandfather ()      Paternal Aunt Breast cancer (48 y)     Physical exam:     Fetal heart tones: 165    Patient appears well and is not in distress  Neck is supple without masses  Breasts are symmetrical without mass, tenderness, nipple discharge, skin changes or adenopathy  Abdomen is soft and nontender without masses  Gravid  External genitals are normal without lesions or rashes  Vagina is normal without discharge or bleeding  Cervix is normal without discharge or lesion  Closed  Uterus is normal for gestational age  Adnexa are normal, nontender, without palpable mass  Discussed as well during this visit was diet, prenatal vitamins, prenatal visits, lab testing, breast feeding, vaccinations, maternal fetal medicine consultations, and lifestyle  ASSESSMENT/PLAN   Problem List Items Addressed This Visit    None     Visit Diagnoses     Prenatal care, subsequent pregnancy, first trimester    -  Primary    Relevant Orders    POCT urine dip (Completed)    Screening for STDs (sexually transmitted diseases)        Relevant Orders    Chlamydia/GC amplified DNA by PCR          1 - Gonorrhea and Chlamydia cultures obtained today via vaginal swab  2 - Pap smear with HPV co-testing noted to be up to date  3 - Prenatal labs reviewed -- 3 hr with 1 of 4 abnormal readings  Otherwise unremarkable  4 - Genetic screening scheduled for 5/26/22  5 - RTO in 4 weeks for routine PN visit    Blue packet given and reviewed     All questions were answered & Herminia Rouse expressed understanding

## 2022-05-23 NOTE — ASSESSMENT & PLAN NOTE
Planning genetic testing -- consult scheduled 5/26  Discussed initiation of LDASA (162 mg QD) for prevention of preeclampsia given age and BMI  Plan for 3rd trimester growth and APFS

## 2022-05-23 NOTE — TELEPHONE ENCOUNTER
----- Message from Deborah Mcgowan [place] sent at 5/23/2022  7:26 AM EDT -----  3 hour gtt with one abnormal value     No GDM at this point, will repeat 3h GTT 26-28 wks  Please notify her

## 2022-05-23 NOTE — ASSESSMENT & PLAN NOTE
BP today 144/88, 134/92 on repeat  At last visit was noted 152/82 on arrival, repeat 132/82  Reports hx of white coat hypertension but has BP cuff at home and reports normal readings  Recommended initiation of LDASA for preeclampsia prevention  Diet and exercise reviewed

## 2022-05-23 NOTE — PROGRESS NOTES
Patient is here for Initial PN 1 visit  She states she has nausea; denies spotting or cramping  12wks/5days  KIARA: 11/30/22  Last pap: 7/10/2020 Pap: neg/HPV: neg  GC/CH collected:yes  PN 1 labs completed  Flu vaccine: yes   Covid vaccine: yes  Urine: protein: neg/glucose: neg  Blue information packet given: yes and reviewed  Nuchal: 5/26/22  Level II US scheduled: 7/15/22  Repeat BP: 134/92

## 2022-05-23 NOTE — RESULT ENCOUNTER NOTE
3 hour gtt with one abnormal value     No GDM at this point, will repeat 3h GTT 26-28 wks  Please notify her

## 2022-05-23 NOTE — PROGRESS NOTES
Problem List Items Addressed This Visit        Other    Multigravida of advanced maternal age in first trimester     Planning genetic testing -- consult scheduled   Discussed initiation of LDASA (162 mg QD) for prevention of preeclampsia given age and BMI  Plan for 3rd trimester growth and APFS  12 weeks gestation of pregnancy     35 y o   at 12w5d with Estimated Date of Delivery: 22 by LMP and consistent w/ 1st trimester US  She has intermittent nausea, mostly in the afternoon, that is mild and typically relieved with small meals  Does not feel need to tx for this  Denies vomiting and bleeding/cramping  No complaints    Prenatal labs: Abnormal early 1 hr  3 hour with 1/4 abnormal readings  Otherwise unremarkable  Genetic screening: Scheduled with [facility]  for sequential screen  OB history:    2 Current                 1 Term 09/26/15 39w0d  3629 g (8 lb) F Vag-Spont Epidural  Living       Reports uncomplicated pregnancy  GYN history: Last pap smear 7/10/2020 NILM/Neg  Denies history of STDs  Past medical history:   Fibroids  Depression and Anxiety, controlled on Prozac  GERD   Asthma     Past surgical history:  Past Surgical History:   Procedure Laterality Date    ARTHROSCOPY KNEE      BREAST SURGERY      reduction procedure    REDUCTION MAMMAPLASTY Bilateral      Social history:  Denies tobacco, alcohol, or illicit drug use      Family history: negative for genetic disorders   Father ()   Heart attack    Hypertension    Pancreatic cancer (64 y)    Cancer    Sister Celiac disease    Other     Lupus   Maternal Grandmother ()   Lung cancer   Maternal Grandfather ()   Other    Paternal Grandmother ()   Breast cancer (54 y)   Mother    Daughter No Known Problems   Paternal Grandfather ()      Paternal Aunt Breast cancer (48 y)     Physical exam:     Fetal heart tones: 165    Patient appears well and is not in distress  Neck is supple without masses  Breasts are symmetrical without mass, tenderness, nipple discharge, skin changes or adenopathy  Abdomen is soft and nontender without masses  Gravid  External genitals are normal without lesions or rashes  Vagina is normal without discharge or bleeding  Cervix is normal without discharge or lesion  Closed  Uterus is normal for gestational age  Adnexa are normal, nontender, without palpable mass  Discussed as well during this visit was diet, prenatal vitamins, prenatal visits, lab testing, breast feeding, vaccinations, maternal fetal medicine consultations, and lifestyle  ASSESSMENT/PLAN   Problem List Items Addressed This Visit    None     Visit Diagnoses     Prenatal care, subsequent pregnancy, first trimester    -  Primary    Relevant Orders    POCT urine dip (Completed)    Screening for STDs (sexually transmitted diseases)        Relevant Orders    Chlamydia/GC amplified DNA by PCR          1 - Gonorrhea and Chlamydia cultures obtained today via vaginal swab  2 - Pap smear with HPV co-testing noted to be up to date  3 - Prenatal labs reviewed -- 3 hr with 1 of 4 abnormal readings  Otherwise unremarkable  4 - Genetic screening scheduled for 5/26/22  5 - RTO in 4 weeks for routine PN visit    Blue packet given and reviewed     All questions were answered & Ulysses Bracken expressed understanding  Elevated blood pressure reading     BP today 144/88, 134/92 on repeat  At last visit was noted 152/82 on arrival, repeat 132/82  Reports hx of white coat hypertension but has BP cuff at home and reports normal readings  Recommended initiation of LDASA for preeclampsia prevention  Diet and exercise reviewed  Abnormal glucose affecting pregnancy     3 hr GTT with one abnormal   Plan to repeat with 28 week labs                Other Visit Diagnoses     Prenatal care, subsequent pregnancy, first trimester    -  Primary    Relevant Orders POCT urine dip (Completed)    Screening for STDs (sexually transmitted diseases)        Relevant Orders    Chlamydia/GC amplified DNA by PCR

## 2022-05-24 LAB
C TRACH DNA SPEC QL NAA+PROBE: NEGATIVE
N GONORRHOEA DNA SPEC QL NAA+PROBE: NEGATIVE

## 2022-05-26 ENCOUNTER — ROUTINE PRENATAL (OUTPATIENT)
Dept: PERINATAL CARE | Facility: CLINIC | Age: 42
End: 2022-05-26
Payer: COMMERCIAL

## 2022-05-26 ENCOUNTER — CONSULT (OUTPATIENT)
Dept: PERINATAL CARE | Facility: CLINIC | Age: 42
End: 2022-05-26
Payer: COMMERCIAL

## 2022-05-26 VITALS
HEIGHT: 62 IN | SYSTOLIC BLOOD PRESSURE: 128 MMHG | BODY MASS INDEX: 37.94 KG/M2 | WEIGHT: 206.2 LBS | DIASTOLIC BLOOD PRESSURE: 73 MMHG | HEART RATE: 80 BPM

## 2022-05-26 DIAGNOSIS — Z3A.13 13 WEEKS GESTATION OF PREGNANCY: ICD-10-CM

## 2022-05-26 DIAGNOSIS — O34.10 UTERINE FIBROID IN PREGNANCY: ICD-10-CM

## 2022-05-26 DIAGNOSIS — D25.9 UTERINE FIBROID IN PREGNANCY: ICD-10-CM

## 2022-05-26 DIAGNOSIS — O09.521 MULTIGRAVIDA OF ADVANCED MATERNAL AGE IN FIRST TRIMESTER: Primary | ICD-10-CM

## 2022-05-26 DIAGNOSIS — O99.210 OBESITY AFFECTING PREGNANCY, ANTEPARTUM: ICD-10-CM

## 2022-05-26 DIAGNOSIS — Z36.82 ENCOUNTER FOR ANTENATAL SCREENING FOR NUCHAL TRANSLUCENCY: ICD-10-CM

## 2022-05-26 DIAGNOSIS — Z31.5 ENCOUNTER FOR PROCREATIVE GENETIC COUNSELING: ICD-10-CM

## 2022-05-26 DIAGNOSIS — O99.810 ABNORMAL GLUCOSE AFFECTING PREGNANCY: ICD-10-CM

## 2022-05-26 PROCEDURE — 76813 OB US NUCHAL MEAS 1 GEST: CPT | Performed by: OBSTETRICS & GYNECOLOGY

## 2022-05-26 PROCEDURE — 36415 COLL VENOUS BLD VENIPUNCTURE: CPT | Performed by: OBSTETRICS & GYNECOLOGY

## 2022-05-26 PROCEDURE — 76801 OB US < 14 WKS SINGLE FETUS: CPT | Performed by: OBSTETRICS & GYNECOLOGY

## 2022-05-26 PROCEDURE — NC001 PR NO CHARGE: Performed by: GENETIC COUNSELOR, MS

## 2022-05-26 PROCEDURE — 3008F BODY MASS INDEX DOCD: CPT | Performed by: OBSTETRICS & GYNECOLOGY

## 2022-05-26 PROCEDURE — 1036F TOBACCO NON-USER: CPT | Performed by: OBSTETRICS & GYNECOLOGY

## 2022-05-26 PROCEDURE — 99243 OFF/OP CNSLTJ NEW/EST LOW 30: CPT | Performed by: OBSTETRICS & GYNECOLOGY

## 2022-05-26 NOTE — PROGRESS NOTES
Patient chose to have [facility] Non-invasive Prenatal Screen  Patient given brochure and is aware [facility] will contact patients insurance and coordinate coverage  Patient made aware she will need to respond to text message or e-mail from [employer] within 2 business days or testing will be run through insurance  Patient informed text message will come from area code  "415"  Provided [address] [place] [address] # [phone] and web site : [email or web address]     2 vials of blood drawn from LEFT arm, patient tolerated blood draw without difficulty  Specimens labeled with patient identifiers (name, date of birth, specimen collection date), packed and sent via [email or web address]  Copy of lab order scanned to Epic media  Maternal Fetal Medicine will have results in approximately 7-10 business days and will call patient or notify via [address]  Patient aware viewing lab result online will reveal fetal sex If ordered  Patient verbalized understanding of all instructions and no questions at this time

## 2022-05-26 NOTE — LETTER
May 26, 2022    Bre Vu  [address] [place] [address],[address] [place] [address], [facility] [phone] [id number]    Patient: Luis Rey   YOB: 1980   Date of Visit: 2022   Gestational age 12w2d   Jaimie Dock of this communication: Routine       Dear Evan Espinal,    This patient was seen recently in our  office  The content of my evaluation today is in the ultrasound report under "OB Procedures" tab  Please don't hesitate to contact our office with any concerns or questions       Sincerely,      Jose Alfredo Stovall MD  Attending Physician, Daniel

## 2022-05-26 NOTE — PROGRESS NOTES
Genetic Counseling   High-Risk Gestation Note    Appointment Date:  2022  Referred By: Gee Anuja, 10 Peter Cruz  YOB: 1980  Partner:  Delroy Mendy     Indication for Visit:  advanced maternal age    Pregnancy History:   Estimated Date of Delivery: 22  Estimated Gestational Age: 13w1d    Additional Information / Impression:  Carmen Roque is a 35 y o  female who presented with her partner for genetic counseling to discuss maternal age related risks for chromosome abnormalities  We reviewed that the risk of Down syndrome at age 35 at delivery is 1/40, and the risk for any chromosomal abnormality at this age is   The risks, benefits, and limitations of amniocentesis were discussed with the patient  Amniocentesis is performed under direct real time ultrasound visualization to avoid both the fetus and the placenta  Once amniotic fluid is withdrawn, laboratory analysis is performed and amniotic fluid alpha-fetoprotein, as well as chromosome and/or microarray analysis is undertaken  The risk of genetic amniocentesis includes, but is not limited to less than 1 in 300 pregnancy loss rate or  delivery rate if 23 weeks or greater, infection, bleeding, rupture of membranes, failure of cells to grow, karyotype error, laboratory error, etc   Occasionally a repeat amniocentesis is necessary due to cell culture failure  Chromosome/microarray analysis from amniocentesis is 99 9% accurate and alpha-fetoprotein analysis can detect approximately 95% of open neural tube defects  Chorionic villus sampling (CVS) is another diagnostic testing option that is available earlier than amniocentesis, between 10-14 weeks gestation  Like amniocentesis, CVS is 99% accurate for detecting chromosomal problems  Unlike amniocentesis, CVS cannot detect alpha-fetoprotein levels in order to determine the risk for open neural tube defects    MSAFP testing would need to be performed at 15-20 weeks gestation for this purpose  The risk of CVS includes, but is not limited to, less than a 1 in 300 risk for pregnancy loss  There is also a 1% risk for maternal cell contamination and cell culture failure, in which case the CVS would need to be followed-up with amniocentesis  We reviewed the testing option of cell free fetal DNA screening (also known as noninvasive prenatal testing or NIPT)  We discussed that it is a serum test to identify fragments of fetal DNA in maternal blood  We reviewed the benefits and limitations of cell free fetal DNA screening in detecting Down syndrome, Trisomy 13, Trisomy 25 and sex chromosome aneuploidies  We also discussed that cell free fetal DNA screening does not detect additional chromosomal abnormalities and the possibility of a failed test result  As cell free fetal DNA screening does not detect open neural tube defects, MSAFP screening is available at 15-20 weeks gestation  We discussed the availability of an ultrasound between 11-14 weeks gestation to measure the nuchal translucency (NT), which can assess for chromosome abnormalities, cardiac defects, and other adverse pregnancy outcomes  We reviewed that level II anatomy ultrasound is typically performed at approximately 20 weeks gestation  Level II ultrasound evaluation is between 60-80% accurate in detecting major physical birth defects and variations in fetal development that may be associated with chromosome abnormalities  Level II ultrasound evaluation is not able to detect all birth defects or health problems  After discussing the available prenatal screening and testing options Nichole Beasley elected to pursue cell free fetal DNA screening  She was informed that the results will disclose the fetal sex and will be available in her MyChart to review  Her blood was drawn immediately after the counseling session  Results take approximately 7-10 days    The patient declined CVS and amniocentesis secondary to procedural related complications  She may reconsider diagnostic testing should the cell free fetal DNA screening come back abnormal   Jennifer Griffin is also planning on pursuing MSAFP screening and Level II ultrasound at the appropriate times  Histories for the patient and her partner's family were taken during our session and was noncontributory  The family history was not significant for genetic diseases or disorders, intellectual disability, birth defects, fetal loss, or consanguinity  Patient reports being of [place] Sabianism/Scottish/English/[place] descent and that her partner is of //Polish/[place] descent  The benefits and limitations of Cystic fibrosis (CF), Spinal muscular atrophy (SMA), hemoglobinopathy, Sabianism disease specific and expanded carrier screening was discussed  The patient was unsure of pursuing carrier screening at this time and elected to further consider the options  Should she decide to have any of the blood work performed she will contact our office or her OB office  Lastly, we discussed the fact that everyone in the general population regardless of age, family history, or medical background has approximately a 3-5% risk of having a child with some type of congenital anomaly, genetic disease or intellectual disability  Currently there are no tests available to rule out all birth defects or health problems  Jennifer Griffin was provided with take home literature and our contact information  I encouraged her to call with any questions or concerns  Time spent with Genetic Counselor: 60 minutes      Plan / Tests Ordered:  1) Patient declined CVS, and amniocentesis  2) Patient elected NIPT - blood drawn for [facility] immediately after our counseling session  3) Patient considering expanded or Sabianism carrier screening - options sent via [employer]  4) MSAFP screening at 15-20 weeks gestation - to be ordered by patient OB office    5) Level II anatomy ultrasound at approximately 20 weeks gestation

## 2022-05-26 NOTE — PROGRESS NOTES
[address] [facility] [address]: Ms Alexx Hdez was seen today for nuchal translucency ultrasound  See ultrasound report under "OB Procedures" tab  Review of Systems   Constitutional: Negative for chills, fever and unexpected weight change  HENT: Negative for congestion, dental problem, facial swelling and sore throat  Eyes: Negative for visual disturbance  Respiratory: Negative for cough and shortness of breath  Cardiovascular: Negative for chest pain and palpitations  Gastrointestinal: Negative for diarrhea and vomiting  Endocrine: Negative for polydipsia  Genitourinary: Negative for dysuria and vaginal bleeding  Musculoskeletal: Negative for back pain and joint swelling  Skin: Negative for rash and wound  Allergic/Immunologic: Negative for immunocompromised state  Neurological: Negative for seizures and headaches  Hematological: Does not bruise/bleed easily  Psychiatric/Behavioral: Negative for hallucinations and suicidal ideas  Physical Exam  Constitutional:       General: She is not in acute distress  Appearance: Normal appearance  She is not ill-appearing, toxic-appearing or diaphoretic  HENT:      Head: Normocephalic and atraumatic  Nose: No congestion or rhinorrhea  Eyes:      General: No scleral icterus  Right eye: No discharge  Left eye: No discharge  Extraocular Movements: Extraocular movements intact  Conjunctiva/sclera: Conjunctivae normal    Pulmonary:      Effort: Pulmonary effort is normal  No respiratory distress  Musculoskeletal:      Cervical back: Normal range of motion  Skin:     Coloration: Skin is not jaundiced or pale  Findings: No erythema, lesion or rash  Neurological:      General: No focal deficit present  Mental Status: She is alert and oriented to person, place, and time     Psychiatric:         Mood and Affect: Mood normal          Behavior: Behavior normal          Please don't hesitate to contact our office with any concerns or questions   -KEEGAN Spann

## 2022-06-03 ENCOUNTER — TELEPHONE (OUTPATIENT)
Dept: PERINATAL CARE | Facility: CLINIC | Age: 42
End: 2022-06-03

## 2022-06-03 NOTE — TELEPHONE ENCOUNTER
----- Message from Shabana Petersen MD sent at 6/3/2022  1:32 PM EDT -----  Hi  RN staff, I've reviewed this NIPS result which shows low residual risk for the conditions tested (trisomies 13, 18, and 21, and sex chromosome abnormality), can you call her to inform her of this result if she has not viewed her result via [employer]? Her OB office is to order the MSAFP  and I sent her a [employer] message as an FYI  Thank you    Shabana Petersen MD

## 2022-06-03 NOTE — TELEPHONE ENCOUNTER
Left VMM for pt with results of her [facility] test, gender not provided  Pt instructed on MSAFP being ordered by OB  and timing of test  Pt instructed to contact nurse line and questions

## 2022-06-20 ENCOUNTER — ROUTINE PRENATAL (OUTPATIENT)
Dept: OBGYN CLINIC | Facility: CLINIC | Age: 42
End: 2022-06-20

## 2022-06-20 ENCOUNTER — APPOINTMENT (OUTPATIENT)
Dept: LAB | Facility: AMBULARY SURGERY CENTER | Age: 42
End: 2022-06-20
Payer: COMMERCIAL

## 2022-06-20 VITALS — SYSTOLIC BLOOD PRESSURE: 110 MMHG | BODY MASS INDEX: 38.41 KG/M2 | WEIGHT: 210 LBS | DIASTOLIC BLOOD PRESSURE: 72 MMHG

## 2022-06-20 DIAGNOSIS — Z34.82 PRENATAL CARE, SUBSEQUENT PREGNANCY IN SECOND TRIMESTER: ICD-10-CM

## 2022-06-20 DIAGNOSIS — Z36.9 FIRST TRIMESTER SCREENING: ICD-10-CM

## 2022-06-20 DIAGNOSIS — Z33.1 INCIDENTAL PREGNANCY: ICD-10-CM

## 2022-06-20 DIAGNOSIS — R03.0 ELEVATED BLOOD PRESSURE READING: ICD-10-CM

## 2022-06-20 DIAGNOSIS — Z3A.16 16 WEEKS GESTATION OF PREGNANCY: Primary | ICD-10-CM

## 2022-06-20 DIAGNOSIS — O09.521 MULTIGRAVIDA OF ADVANCED MATERNAL AGE IN FIRST TRIMESTER: ICD-10-CM

## 2022-06-20 LAB
SL AMB  POCT GLUCOSE, UA: NEGATIVE
SL AMB POCT URINE PROTEIN: NEGATIVE

## 2022-06-20 PROCEDURE — PNV: Performed by: CLINICAL NURSE SPECIALIST

## 2022-06-20 PROCEDURE — 82105 ALPHA-FETOPROTEIN SERUM: CPT

## 2022-06-20 PROCEDURE — 36415 COLL VENOUS BLD VENIPUNCTURE: CPT

## 2022-06-20 RX ORDER — ASPIRIN 81 MG/1
162 TABLET ORAL DAILY
COMMUNITY

## 2022-06-20 NOTE — ASSESSMENT & PLAN NOTE
[id number], 16w5d  Doing well overall  Genetic screening- NIPT- low risk/WNL results  Discussed recommended testing for ONTD - AFP and order placed  Keep appt for Level 2 US  We reviewed the common symptoms of this stage of pregnancy as well as warning signs/symptoms, including spotting/bleeding, severe pain or persistent nausea and vomiting with an inability keep anything down for 24 hours  I have reviewed and updated the patient's problem list Her questions were answered to her satisfaction

## 2022-06-20 NOTE — PROGRESS NOTES
No VB or cramping  Not feeling movement yet  Covid x 2  Flu 1/18/22  Tdap- 28 weeks  Passed 3 hour GTT- another at 28 weeks  Slight nausea  AFP given  Level 2 scheduled 7/15/22

## 2022-06-20 NOTE — PROGRESS NOTES
Prenatal Visit  Subjective:   Dvaid Maria is a 35 y o  [id number] 16w5d here for Routine Prenatal Visit       She reports that she is having the following symptoms: none- feels well  She denies any cramping, LOF/unusual discharge or VB  She has obtained her prenatal labs and the results have been reviewed with her  Taking LDA    The patient has chosen to undergo  NIPT genetic screening  Due for AFP  Her Level II u/s has been ordered and has been scheduled  Objective:  Vitals:    06/20/22 0951   BP: 110/72     Pregravid Weight/BMI: 93 kg (205 lb) (BMI 37 49)  Current Weight: 95 3 kg (210 lb)   Total Weight Gain: 2 268 kg (5 lb)     Fetal Heart Rate: 144      OBGyn Exam  Physical Exam  Fetal Heart Rate: 144 ,      General: Not in acute distress and appearing well nourished and well groomed  Genitourinary:          Pelvic exam exam deferred   Cardiovascular:   Rate and Rhythm: Normal rate  Pulmonary:    Effort: normal, not labored  Abdominal:  Abdomen is soft   Musculoskeletal: Active movement of all extremities, no gross limitations of ROM     Edema:None noted  Neurological:   Mental Status: She is alert and oriented to person, place, and time  Skin: General: Skin is warm and dry  Psychiatric:    Mood and Affect: Mood normal       Behavior: Behavior normal          Assessment & Plan:    1  16 weeks gestation of pregnancy  Assessment & Plan:    [id number], 16w5d  Doing well overall  Genetic screening- NIPT- low risk/WNL results  Discussed recommended testing for ONTD - AFP and order placed  Keep appt for Level 2 US  We reviewed the common symptoms of this stage of pregnancy as well as warning signs/symptoms, including spotting/bleeding, severe pain or persistent nausea and vomiting with an inability keep anything down for 24 hours  I have reviewed and updated the patient's problem list Her questions were answered to her satisfaction            2  Prenatal care, subsequent pregnancy in second trimester  -     POCT urine dip    3  Incidental pregnancy  -     Alpha fetoprotein, maternal; Future; Expected date: 06/20/2022    4  First trimester screening  -     Alpha fetoprotein, maternal; Future; Expected date: 06/20/2022    5  Multigravida of advanced maternal age in first trimester  Assessment & Plan:  Did NIPT- nml/low risk result      6  Elevated blood pressure reading  Assessment & Plan:  Normal BP today  She is taking LDA for pre-e ppx            KEEGAN Fenton  6/20/2022

## 2022-06-20 NOTE — PATIENT INSTRUCTIONS
Pregnancy at 15 to 18 Weeks   AMBULATORY CARE:   What changes are happening to your body:  Now that you are in your second trimester, you have more energy  You may also feel hungrier than usual  You may start to experience other symptoms, such as heartburn or dizziness  You may be gaining about ½ to 1 pound a week, and your pregnancy is beginning to show  You may need to start wearing maternity clothes  Seek care immediately if:   You have pain or cramping in your abdomen or low back  You have heavy vaginal bleeding or clotting  You pass material that looks like tissue or large clots  Collect the material and bring it with you  Call your doctor or obstetrician if:   You cannot keep food or drinks down, and you are losing weight  You have light bleeding  You have chills or a fever  You have vaginal itching, burning, or pain  You have yellow, green, white, or foul-smelling vaginal discharge  You have pain or burning when you urinate, less urine than usual, or pink or bloody urine  You have questions or concerns about your condition or care  How to care for yourself at this stage of your pregnancy:       Manage heartburn  by eating 4 or 5 small meals each day instead of large meals  Avoid spicy foods  Avoid eating right before bedtime  Manage nausea and vomiting  Avoid fatty and spicy foods  Eat small meals throughout the day instead of large meals  Alma may help to decrease nausea  Ask your healthcare provider about other ways of decreasing nausea and vomiting  Eat a variety of healthy foods  Healthy foods include fruits, vegetables, whole-grain breads, low-fat dairy foods, beans, lean meats, and fish  Drink liquids as directed  Ask how much liquid to drink each day and which liquids are best for you  Limit caffeine to less than 200 milligrams each day  Limit your intake of fish to 2 servings each week   Choose fish low in mercury such as canned light tuna, shrimp, salmon, cod, or tilapia  Do not  eat fish high in mercury such as swordfish, tilefish, saeid mackerel, and shark  Take prenatal vitamins as directed  Your need for certain vitamins and minerals, such as folic acid, increases during pregnancy  Prenatal vitamins provide some of the extra vitamins and minerals you need  Prenatal vitamins may also help to decrease the risk of certain birth defects  Do not smoke  Smoking increases your risk of a miscarriage and other health problems during your pregnancy  Smoking can cause your baby to be born too early or weigh less at birth  Ask your healthcare provider for information if you need help quitting  Do not drink alcohol  Alcohol passes from your body to your baby through the placenta  It can affect your baby's brain development and cause fetal alcohol syndrome (FAS)  FAS is a group of conditions that causes mental, behavior, and growth problems  Talk to your healthcare provider before you take any medicines  Many medicines may harm your baby if you take them when you are pregnant  Do not take any medicines, vitamins, herbs, or supplements without first talking to your healthcare provider  Never use illegal or street drugs (such as marijuana or cocaine) while you are pregnant  Safety tips during pregnancy:   Avoid hot tubs and saunas  Do not use a hot tub or sauna while you are pregnant, especially during your first trimester  Hot tubs and saunas may raise your baby's temperature and increase the risk of birth defects  Avoid toxoplasmosis  This is an infection caused by eating raw meat or being around infected cat feces  It can cause birth defects, miscarriages, and other problems  Wash your hands after you touch raw meat  Make sure any meat is well-cooked before you eat it  Avoid raw eggs and unpasteurized milk  Use gloves or ask someone else to clean your cat's litter box while you are pregnant      Changes that are happening with your baby:  By 90 weeks, your baby may be about 6 inches long from the top of the head to the rump (baby's bottom)  Your baby may weigh about 11 ounces  You may be able to feel your baby's movement at about 18 weeks or later  The first movements may not be that noticeable  They may feel like a fluttering sensation  Your baby also makes sucking movements and can hear certain sounds  What you need to know about prenatal care:  During the first 28 weeks of your pregnancy, you will see your healthcare provider once a month  Your healthcare provider will check your blood pressure and weight  You may also need any of the following:  A urine test  may also be done to check for sugar and protein  These can be signs of gestational diabetes or infection  A blood test  may be done to check for anemia (low iron level)  Fundal height check  is a measurement of your uterus to check your baby's growth  This number is usually the same as the number of weeks that you have been pregnant  An ultrasound  may be done to check your baby's development  Your healthcare provider may be able to tell you what your baby's gender is during the ultrasound  Your baby's heart rate  will be checked  © Copyright [employer] 2022 Information is for End User's use only and may not be sold, redistributed or otherwise used for commercial purposes  All illustrations and images included in CareNotes® are the copyrighted property of [employer]  or [employer]  The above information is an  only  It is not intended as medical advice for individual conditions or treatments  Talk to your doctor, nurse or pharmacist before following any medical regimen to see if it is safe and effective for you

## 2022-06-26 LAB
2ND TRIMESTER 4 SCREEN SERPL-IMP: NORMAL
AFP ADJ MOM SERPL: 0.77
AFP INTERP AMN-IMP: NORMAL
AFP INTERP SERPL-IMP: NORMAL
AFP INTERP SERPL-IMP: NORMAL
AFP SERPL-MCNC: 24.2 NG/ML
AGE AT DELIVERY: 43.9 YR
GA METHOD: NORMAL
GA: 16.7 WEEKS
IDDM PATIENT QL: NO
MULTIPLE PREGNANCY: NO
NEURAL TUBE DEFECT RISK FETUS: NORMAL %

## 2022-07-15 ENCOUNTER — ROUTINE PRENATAL (OUTPATIENT)
Dept: PERINATAL CARE | Facility: CLINIC | Age: 42
End: 2022-07-15
Payer: COMMERCIAL

## 2022-07-15 VITALS
SYSTOLIC BLOOD PRESSURE: 128 MMHG | DIASTOLIC BLOOD PRESSURE: 72 MMHG | HEART RATE: 106 BPM | BODY MASS INDEX: 39.2 KG/M2 | HEIGHT: 62 IN | WEIGHT: 213 LBS

## 2022-07-15 DIAGNOSIS — E66.9 OBESITY (BMI 35.0-39.9 WITHOUT COMORBIDITY): ICD-10-CM

## 2022-07-15 DIAGNOSIS — O99.810 ABNORMAL GLUCOSE AFFECTING PREGNANCY: ICD-10-CM

## 2022-07-15 DIAGNOSIS — D25.9 UTERINE FIBROID IN PREGNANCY: ICD-10-CM

## 2022-07-15 DIAGNOSIS — D21.9 FIBROIDS: Primary | ICD-10-CM

## 2022-07-15 DIAGNOSIS — Z3A.20 20 WEEKS GESTATION OF PREGNANCY: ICD-10-CM

## 2022-07-15 DIAGNOSIS — O34.10 UTERINE FIBROID IN PREGNANCY: ICD-10-CM

## 2022-07-15 DIAGNOSIS — O09.522 MULTIGRAVIDA OF ADVANCED MATERNAL AGE IN SECOND TRIMESTER: ICD-10-CM

## 2022-07-15 PROCEDURE — 76817 TRANSVAGINAL US OBSTETRIC: CPT | Performed by: OBSTETRICS & GYNECOLOGY

## 2022-07-15 PROCEDURE — 76811 OB US DETAILED SNGL FETUS: CPT | Performed by: OBSTETRICS & GYNECOLOGY

## 2022-07-15 PROCEDURE — 99213 OFFICE O/P EST LOW 20 MIN: CPT | Performed by: OBSTETRICS & GYNECOLOGY

## 2022-07-15 NOTE — LETTER
July 20, 2022     Bre Brady  [phone] [facility]  [address],  [address] [id number]    Patient: Tiesha Hernandez   YOB: 1980   Date of Visit: 7/15/2022       Dear Dr Lisseth Johnson: Thank you for referring Tiesha Hernandez to me for evaluation  Below are my notes for this consultation  If you have questions, please do not hesitate to call me  I look forward to following your patient along with you  Sincerely,        Villa Lawson MD        CC: No Recipients  Villa Lawson MD  7/20/2022  6:34 AM  Sign when Signing Visit  A fetal ultrasound was completed  See Ob procedures in Epic for an interpretation and recommendations  Do not hesitate to contact us in [facility] if you have questions  Jesusita Puna Doree Goltz MD, [address] [place] [address]  Maternal Fetal Medicine

## 2022-07-19 NOTE — PROGRESS NOTES
Level 2 Completed  UTD Covid x 2  Flu   Failed Early 1hr GTT -Passed early 3 hour GTT  Denies LOF, VB, CTX    +FM!

## 2022-07-20 PROBLEM — O09.522 MULTIGRAVIDA OF ADVANCED MATERNAL AGE IN SECOND TRIMESTER: Status: ACTIVE | Noted: 2022-04-25

## 2022-07-20 PROBLEM — Z3A.20 20 WEEKS GESTATION OF PREGNANCY: Status: ACTIVE | Noted: 2022-04-25

## 2022-07-20 PROBLEM — E66.9 OBESITY (BMI 35.0-39.9 WITHOUT COMORBIDITY): Status: ACTIVE | Noted: 2022-07-20

## 2022-07-20 NOTE — PROGRESS NOTES
A fetal ultrasound was completed  See Ob procedures in Epic for an interpretation and recommendations  Do not hesitate to contact us in [facility] if you have questions  Michele Sanches MD, [address]  Maternal Fetal Medicine

## 2022-07-21 ENCOUNTER — ROUTINE PRENATAL (OUTPATIENT)
Dept: OBGYN CLINIC | Facility: CLINIC | Age: 42
End: 2022-07-21

## 2022-07-21 VITALS — BODY MASS INDEX: 39.25 KG/M2 | WEIGHT: 214.6 LBS | DIASTOLIC BLOOD PRESSURE: 82 MMHG | SYSTOLIC BLOOD PRESSURE: 132 MMHG

## 2022-07-21 DIAGNOSIS — Z34.82 PRENATAL CARE, SUBSEQUENT PREGNANCY IN SECOND TRIMESTER: Primary | ICD-10-CM

## 2022-07-21 PROBLEM — Z3A.21 21 WEEKS GESTATION OF PREGNANCY: Status: ACTIVE | Noted: 2022-04-25

## 2022-07-21 LAB
SL AMB  POCT GLUCOSE, UA: NORMAL
SL AMB POCT URINE PROTEIN: NORMAL

## 2022-07-21 PROCEDURE — PNV: Performed by: OBSTETRICS & GYNECOLOGY

## 2022-08-02 ENCOUNTER — TELEPHONE (OUTPATIENT)
Dept: OBGYN CLINIC | Facility: CLINIC | Age: 42
End: 2022-08-02

## 2022-08-02 NOTE — TELEPHONE ENCOUNTER
Returned pt's p c - pt states "she had pain at her belly button today " advised pt increase water intake to 6-8 glasses/day, take tylenol prn  Informed could be uterus- fundus is at level of umbilicus or Round ligament pain  Pt states "it did go away "  Advised pt call if pain would continue,  or experiences any VB LOF, cxtns  , decrease in FM  Pt verbalized understanding

## 2022-08-15 ENCOUNTER — ROUTINE PRENATAL (OUTPATIENT)
Dept: OBGYN CLINIC | Facility: CLINIC | Age: 42
End: 2022-08-15

## 2022-08-15 VITALS — DIASTOLIC BLOOD PRESSURE: 60 MMHG | BODY MASS INDEX: 39.69 KG/M2 | SYSTOLIC BLOOD PRESSURE: 126 MMHG | WEIGHT: 217 LBS

## 2022-08-15 DIAGNOSIS — O99.810 ABNORMAL GLUCOSE AFFECTING PREGNANCY: ICD-10-CM

## 2022-08-15 DIAGNOSIS — Z34.82 PRENATAL CARE, SUBSEQUENT PREGNANCY IN SECOND TRIMESTER: Primary | ICD-10-CM

## 2022-08-15 DIAGNOSIS — Z3A.24 24 WEEKS GESTATION OF PREGNANCY: ICD-10-CM

## 2022-08-15 LAB
SL AMB  POCT GLUCOSE, UA: NEGATIVE
SL AMB POCT URINE PROTEIN: NORMAL

## 2022-08-15 PROCEDURE — PNV: Performed by: CLINICAL NURSE SPECIALIST

## 2022-08-15 NOTE — ASSESSMENT & PLAN NOTE
Previous early abnormal glucola with normal 3 hour gtt   Script given for 3 hour gtt to be done with 3rd trimester labs

## 2022-08-15 NOTE — ASSESSMENT & PLAN NOTE
24w5d  Doing well  Reviewed the following today:   S/S PTL including to call if having >  4-6 ctx in an hour   S/S HTN disorders in pregnancy such as pre-eclampsia   Orders for routine 3rd trimester bloodwork given today -to be done around 26-28 wks- see ordered   Tdap vaccine- to be given to all pregnant women in the 3rd trimester (27-36 weeks) of each pregnancy in order to protect against pertussis  It is also recommended that anyone who is going to have close contact with the baby also get the vaccine at their health care provider

## 2022-08-15 NOTE — PATIENT INSTRUCTIONS
Pregnancy at 23 to 26 Weeks   AMBULATORY CARE:   What changes are happening to your body: You are now close to or at the beginning of the third trimester  The third trimester starts at 24 weeks and ends with delivery  As your baby gets larger, you may develop certain symptoms  These may include pain in your back or down the sides of your abdomen  You may also have stretch marks on your abdomen, breasts, thighs, or buttocks  You may also have constipation  Seek care immediately if:   You develop a severe headache that does not go away  You have new or increased vision changes, such as blurred or spotted vision  You have new or increased swelling in your face or hands  You have vaginal spotting or bleeding  Your water broke or you feel warm water gushing or trickling from your vagina  Call your doctor or obstetrician if:   You have abdominal cramps, pressure, or tightening  You have a change in vaginal discharge  You have light bleeding  You have chills or a fever  You have vaginal itching, burning, or pain  You have yellow, green, white, or foul-smelling vaginal discharge  You have pain or burning when you urinate, less urine than usual, or pink or bloody urine  You have questions or concerns about your condition or care  How to care for yourself at this stage of your pregnancy:       Eat a variety of healthy foods  Healthy foods include fruits, vegetables, whole-grain breads, low-fat dairy foods, beans, lean meats, and fish  Drink liquids as directed  Ask how much liquid to drink each day and which liquids are best for you  Limit caffeine to less than 200 milligrams each day  Limit your intake of fish to 2 servings each week  Choose fish low in mercury such as canned light tuna, shrimp, salmon, cod, or tilapia  Do not  eat fish high in mercury such as swordfish, tilefish, saeid mackerel, and shark  Manage back pain    Do not stand for long periods of time or lift heavy items  Use good posture while you stand, squat, or bend  Wear low-heeled shoes with good support  Rest may also help to relieve back pain  Ask your healthcare provider about exercises you can do to strengthen your back muscles  Take prenatal vitamins as directed  Your need for certain vitamins and minerals, such as folic acid, increases during pregnancy  Prenatal vitamins provide some of the extra vitamins and minerals you need  Prenatal vitamins may also help to decrease the risk of certain birth defects  Talk to your healthcare provider about exercise  Moderate exercise can help you stay fit  Your healthcare provider will help you plan an exercise program that is safe for you during pregnancy  Do not smoke  Smoking increases your risk of a miscarriage and other health problems during your pregnancy  Smoking can cause your baby to be born too early or weigh less at birth  Ask your healthcare provider for information if you need help quitting  Do not drink alcohol  Alcohol passes from your body to your baby through the placenta  It can affect your baby's brain development and cause fetal alcohol syndrome (FAS)  FAS is a group of conditions that causes mental, behavior, and growth problems  Talk to your healthcare provider before you take any medicines  Many medicines may harm your baby if you take them when you are pregnant  Do not take any medicines, vitamins, herbs, or supplements without first talking to your healthcare provider  Never use illegal or street drugs (such as marijuana or cocaine) while you are pregnant  Safety tips during pregnancy:   Avoid hot tubs and saunas  Do not use a hot tub or sauna while you are pregnant, especially during your first trimester  Hot tubs and saunas may raise your baby's temperature and increase the risk of birth defects  Avoid toxoplasmosis  This is an infection caused by eating raw meat or being around infected cat feces   It can cause birth defects, miscarriages, and other problems  Wash your hands after you touch raw meat  Make sure any meat is well-cooked before you eat it  Avoid raw eggs and unpasteurized milk  Use gloves or ask someone else to clean your cat's litter box while you are pregnant  Changes that are happening with your baby:  By 26 weeks, your baby will weigh about 2 pounds  Your baby will be about 10 inches long from the top of the head to the rump (baby's bottom)  Your baby's movements are much stronger now  Your baby's eyes are almost completely formed and can partially open  Your baby also sleeps and wakes up  What you need to know about prenatal care: Your healthcare provider will check your blood pressure and weight  You may also need the following:  A urine test  may also be done to check for sugar and protein  These can be signs of gestational diabetes or infection  Protein in your urine may also be a sign of preeclampsia  Preeclampsia is a condition that can develop during week 20 or later of your pregnancy  It causes high blood pressure, and it can cause problems with your kidneys and other organs  A gestational diabetes screen  may be done  Your healthcare provider may order either a 1-step or 2-step oral glucose tolerance test (OGTT)  1-step OGTT:  Your blood sugar level will be tested after you have not eaten for 8 hours (fasting)  You will then be given a glucose drink  Your level will be tested again 1 hour and 2 hours after you finish the drink  2-step OGTT:  You do not have to fast for the first part of the test  You will have the glucose drink at any time of day  Your blood sugar level will be checked 1 hour later  If your blood sugar is higher than a certain level, another test will be ordered  You will fast and your blood sugar level will be tested  You will have the glucose drink  Your blood will be tested again 1 hour, 2 hours, and 3 hours after you finish the glucose drink      Fundal height is a measurement of your uterus to check your baby's growth  This number is usually the same as the number of weeks that you have been pregnant  Your baby's heart rate  will be checked  Follow up with your doctor or obstetrician as directed:  Write down your questions so you remember to ask them during your visits  © Copyright [employer] 2022 Information is for End User's use only and may not be sold, redistributed or otherwise used for commercial purposes  All illustrations and images included in CareNotes® are the copyrighted property of [employer]  or [facility] Rahel Logan   The above information is an  only  It is not intended as medical advice for individual conditions or treatments  Talk to your doctor, nurse or pharmacist before following any medical regimen to see if it is safe and effective for you

## 2022-08-15 NOTE — PROGRESS NOTES
Kadie LOF, VB, and CX  GFM  Had one episode when she was bending down her belly button hurt- took Tylenol hasn't done it since  28 week labs given  No concerns

## 2022-08-15 NOTE — PROGRESS NOTES
Prenatal Visit  Subjective:   Tony Viera is a 35 y o  [id number] 24w5d here for Routine Prenatal Visit      Denies unusual vaginal discharge, LOF, VB, or ctx  Reports Fetus is active  Taking LDAsa      Objective:  Vitals:    08/15/22 1403   BP: 126/60     Pregravid Weight/BMI: 93 kg (205 lb) (BMI 37 49)  Current Weight: 98 4 kg (217 lb)   Total Weight Gain: 5 443 kg (12 lb)     Fetal Heart Rate: 150  Fundal Height (cm): 25 cm    OBGyn Exam  Physical Exam  Fetal Heart Rate: 150 , Fundal Height (cm): 25 cm  General: Not in acute distress and appearing well nourished and well groomed  Genitourinary:          Pelvic exam exam deferred   Cardiovascular:   Rate and Rhythm: Normal rate  Pulmonary:    Effort: normal, not labored  Abdominal:  Abdomen is soft and gravid    Musculoskeletal: Active movement of all extremities, no gross limitations of ROM     Edema: None  Neurological:   Mental Status: She is alert and oriented to person, place, and time  Skin: General: Skin is warm and dry  Psychiatric:    Mood and Affect: Mood normal       Behavior: Behavior normal      Assessment & Plan:           1  Prenatal care, subsequent pregnancy in second trimester  -     Anemia Panel w/Reflex, OB; Future; Expected date: 08/24/2022  -     RPR; Future; Expected date: 08/24/2022  -     CBC and differential; Future; Expected date: 08/24/2022  -     POCT urine dip    2  Abnormal glucose affecting pregnancy  Assessment & Plan:  Previous early abnormal glucola with normal 3 hour gtt  Script given for 3 hour gtt to be done with 3rd trimester labs    Orders:  -     Glucose JOSELIN 3HR 100GM PREG PTS; Future    3  24 weeks gestation of pregnancy  Assessment & Plan:  24w5d  Doing well  Reviewed the following today:   S/S PTL including to call if having >  4-6 ctx in an hour      S/S HTN disorders in pregnancy such as pre-eclampsia   Orders for routine 3rd trimester bloodwork given today -to be done around 26-28 wks- see ordered   Tdap vaccine- to be given to all pregnant women in the 3rd trimester (27-36 weeks) of each pregnancy in order to protect against pertussis  It is also recommended that anyone who is going to have close contact with the baby also get the vaccine at their health care provider          KEEGAN Browning  8/15/2022

## 2022-08-18 PROBLEM — O99.210 OBESITY IN PREGNANCY, ANTEPARTUM: Status: ACTIVE | Noted: 2022-08-18

## 2022-08-18 NOTE — PATIENT INSTRUCTIONS
Thank you for choosing us for your  care today  If you have any questions about your ultrasound or care, please do not hesitate to contact us or your primary obstetrician  Some general instructions for your pregnancy are:    Protect against coronavirus: get vaccinated - pregnant women are increased risk of severe COVID  Notify your primary care doctor if you have any symptoms  Exercise: Aim for 22 minutes per day (150 minutes per week) of regular exercise  Walking is great! Nutrition: aim for calcium-rich and iron-rich foods as well as healthy sources of protein  Learn about Preeclampsia: preeclampsia is a common, serious high blood pressure complication in pregnancy  A blood pressure of 971GAAT (systolic or top number) or 47NBEJ (diastolic or bottom number) is not normal and needs evaluation by your doctor  Aspirin is sometimes prescribed in early pregnancy to prevent preeclampsia in women with risk factors - ask your obstetrician if you should be on this medication  If you smoke, try to reduce how many cigarettes you smoke or try to quit completely  Do not vape  Other warning signs to watch out for in pregnancy or postpartum: chest pain, obstructed breathing or shortness of breath, seizures, thoughts of hurting yourself or your baby, bleeding, a painful or swollen leg, fever, or headache (see [facility] POST-BIRTH Warning Signs campaign)  If these happen call 911  Itching is also not normal in pregnancy and if you experience this, especially over your hands and feet, potentially worse at night, notify your doctors

## 2022-08-19 ENCOUNTER — ULTRASOUND (OUTPATIENT)
Dept: PERINATAL CARE | Facility: CLINIC | Age: 42
End: 2022-08-19
Payer: COMMERCIAL

## 2022-08-19 VITALS
HEIGHT: 62 IN | HEART RATE: 98 BPM | BODY MASS INDEX: 40.37 KG/M2 | SYSTOLIC BLOOD PRESSURE: 116 MMHG | WEIGHT: 219.4 LBS | DIASTOLIC BLOOD PRESSURE: 68 MMHG

## 2022-08-19 DIAGNOSIS — O09.522 MULTIGRAVIDA OF ADVANCED MATERNAL AGE IN SECOND TRIMESTER: Primary | ICD-10-CM

## 2022-08-19 DIAGNOSIS — Z36.2 ENCOUNTER FOR FOLLOW-UP ULTRASOUND OF FETAL ANATOMY: ICD-10-CM

## 2022-08-19 DIAGNOSIS — O99.210 OBESITY IN PREGNANCY, ANTEPARTUM: ICD-10-CM

## 2022-08-19 DIAGNOSIS — Z3A.25 25 WEEKS GESTATION OF PREGNANCY: ICD-10-CM

## 2022-08-19 DIAGNOSIS — D25.9 UTERINE FIBROID IN PREGNANCY: ICD-10-CM

## 2022-08-19 DIAGNOSIS — O35.EXX0 PYELECTASIS OF FETUS ON PRENATAL ULTRASOUND: ICD-10-CM

## 2022-08-19 DIAGNOSIS — O34.10 UTERINE FIBROID IN PREGNANCY: ICD-10-CM

## 2022-08-19 PROBLEM — O35.8XX0 PYELECTASIS OF FETUS ON PRENATAL ULTRASOUND: Status: ACTIVE | Noted: 2022-08-19

## 2022-08-19 PROCEDURE — 76816 OB US FOLLOW-UP PER FETUS: CPT | Performed by: OBSTETRICS & GYNECOLOGY

## 2022-08-19 PROCEDURE — 99213 OFFICE O/P EST LOW 20 MIN: CPT | Performed by: NURSE PRACTITIONER

## 2022-08-19 NOTE — PROGRESS NOTES
[address]: Ms Pankaj Koch was seen today for followup missed anatomy ultrasound  See ultrasound report under "OB Procedures" tab  Mild dilation of renal pelvis noted on today's scan with normal CHENG and completion of anatomy scan  Please don't hesitate to contact our office with any concerns or questions   -KEEGAN Barahona    I spent 15 minutes devoted to patient care (4 min chart preparation, 6 minutes face to face and 5 minutes documenting)

## 2022-09-03 ENCOUNTER — PATIENT MESSAGE (OUTPATIENT)
Dept: OBGYN CLINIC | Facility: CLINIC | Age: 42
End: 2022-09-03

## 2022-09-03 ENCOUNTER — LAB (OUTPATIENT)
Dept: LAB | Facility: CLINIC | Age: 42
End: 2022-09-03
Payer: COMMERCIAL

## 2022-09-03 DIAGNOSIS — Z34.82 PRENATAL CARE, SUBSEQUENT PREGNANCY IN SECOND TRIMESTER: ICD-10-CM

## 2022-09-03 DIAGNOSIS — O99.810 ABNORMAL GLUCOSE AFFECTING PREGNANCY: ICD-10-CM

## 2022-09-03 LAB
BASOPHILS # BLD AUTO: 0.03 THOUSANDS/ΜL (ref 0–0.1)
BASOPHILS NFR BLD AUTO: 0 % (ref 0–1)
EOSINOPHIL # BLD AUTO: 0.08 THOUSAND/ΜL (ref 0–0.61)
EOSINOPHIL NFR BLD AUTO: 1 % (ref 0–6)
ERYTHROCYTE [DISTWIDTH] IN BLOOD BY AUTOMATED COUNT: 13.2 % (ref 11.6–15.1)
FERRITIN SERPL-MCNC: 8 NG/ML (ref 8–388)
GLUCOSE P FAST SERPL-MCNC: 104 MG/DL (ref 65–94)
HCT VFR BLD AUTO: 31.1 % (ref 34.8–46.1)
HGB BLD-MCNC: 10.3 G/DL (ref 11.5–15.4)
IMM GRANULOCYTES # BLD AUTO: 0.04 THOUSAND/UL (ref 0–0.2)
IMM GRANULOCYTES NFR BLD AUTO: 1 % (ref 0–2)
LYMPHOCYTES # BLD AUTO: 2.03 THOUSANDS/ΜL (ref 0.6–4.47)
LYMPHOCYTES NFR BLD AUTO: 25 % (ref 14–44)
MCH RBC QN AUTO: 27.9 PG (ref 26.8–34.3)
MCHC RBC AUTO-ENTMCNC: 33.1 G/DL (ref 31.4–37.4)
MCV RBC AUTO: 84 FL (ref 82–98)
MONOCYTES # BLD AUTO: 0.52 THOUSAND/ΜL (ref 0.17–1.22)
MONOCYTES NFR BLD AUTO: 6 % (ref 4–12)
NEUTROPHILS # BLD AUTO: 5.55 THOUSANDS/ΜL (ref 1.85–7.62)
NEUTS SEG NFR BLD AUTO: 67 % (ref 43–75)
NRBC BLD AUTO-RTO: 0 /100 WBCS
PLATELET # BLD AUTO: 316 THOUSANDS/UL (ref 149–390)
PMV BLD AUTO: 9.9 FL (ref 8.9–12.7)
RBC # BLD AUTO: 3.69 MILLION/UL (ref 3.81–5.12)
RPR SER QL: NORMAL
WBC # BLD AUTO: 8.25 THOUSAND/UL (ref 4.31–10.16)

## 2022-09-03 PROCEDURE — 82951 GLUCOSE TOLERANCE TEST (GTT): CPT

## 2022-09-03 PROCEDURE — 82728 ASSAY OF FERRITIN: CPT

## 2022-09-03 PROCEDURE — 36415 COLL VENOUS BLD VENIPUNCTURE: CPT

## 2022-09-03 PROCEDURE — 86592 SYPHILIS TEST NON-TREP QUAL: CPT

## 2022-09-03 PROCEDURE — 85025 COMPLETE CBC W/AUTO DIFF WBC: CPT

## 2022-09-04 DIAGNOSIS — F41.9 ANXIETY: ICD-10-CM

## 2022-09-05 RX ORDER — FLUOXETINE 10 MG/1
CAPSULE ORAL
Qty: 90 CAPSULE | Refills: 1 | Status: SHIPPED | OUTPATIENT
Start: 2022-09-05

## 2022-09-06 ENCOUNTER — TELEPHONE (OUTPATIENT)
Dept: OBGYN CLINIC | Facility: CLINIC | Age: 42
End: 2022-09-06

## 2022-09-06 DIAGNOSIS — O99.810 ABNORMAL GLUCOSE AFFECTING PREGNANCY: ICD-10-CM

## 2022-09-06 DIAGNOSIS — O24.419 GESTATIONAL DIABETES MELLITUS (GDM) IN THIRD TRIMESTER, GESTATIONAL DIABETES METHOD OF CONTROL UNSPECIFIED: Primary | ICD-10-CM

## 2022-09-06 DIAGNOSIS — O99.019 MATERNAL ANEMIA, ANTEPARTUM: ICD-10-CM

## 2022-09-06 DIAGNOSIS — O99.810 ABNORMAL GLUCOSE IN PREGNANCY, ANTEPARTUM: ICD-10-CM

## 2022-09-06 RX ORDER — FERROUS SULFATE TAB EC 324 MG (65 MG FE EQUIVALENT) 324 (65 FE) MG
324 TABLET DELAYED RESPONSE ORAL
Qty: 60 TABLET | Refills: 6 | Status: SHIPPED | OUTPATIENT
Start: 2022-09-06 | End: 2022-09-28

## 2022-09-06 NOTE — TELEPHONE ENCOUNTER
----- Message from Eber Vidal sent at 9/6/2022  9:43 AM EDT -----  Abnormal CBC and glucose  Nml RPR  -= GDM dx- referral to m placed   -Also BW showing development of anemia  recommend iron BID  This should be taken with either vitamin C tablets or Orange   Juice  Rev constipation is very common with increased iron  can take colace (docusate sodium) a stool softener as needed (up to twice per day) for constipation  These are all over the counter medications  Called pt and left msg    Please f/u with pt later

## 2022-09-06 NOTE — RESULT ENCOUNTER NOTE
Abnormal CBC and glucose  Nml RPR  -= GDM dx- referral to mfm placed   -Also BW showing development of anemia  recommend iron BID  This should be taken with either vitamin C tablets or Orange   Juice  Rev constipation is very common with increased iron  can take colace (docusate sodium) a stool softener as needed (up to twice per day) for constipation  These are all over the counter medications  Called pt and left msg    Please f/u with pt later

## 2022-09-06 NOTE — TELEPHONE ENCOUNTER
----- Message from Kandy Benoit, [address] sent at 9/6/2022  9:43 AM EDT -----  Abnormal CBC and glucose  Nml RPR  -= GDM dx- referral to m placed   -Also BW showing development of anemia  recommend iron BID  This should be taken with either vitamin C tablets or Orange   Juice  Rev constipation is very common with increased iron  can take colace (docusate sodium) a stool softener as needed (up to twice per day) for constipation  These are all over the counter medications  Called pt and left msg    Please f/u with pt later

## 2022-09-06 NOTE — RESULT ENCOUNTER NOTE
Pt returned call and reports she was not truly fasting for GDM testing - took chewable vitamins which has sugar in it  Will repeat testing as fasting  Discussed anemia

## 2022-09-06 NOTE — TELEPHONE ENCOUNTER
Message was left for pt, by KEEGAN Smith  regarding her abn CBC & glucose  - recommendations given for both  - will call pt later today, to assure pt has received the message & has any further questions

## 2022-09-10 ENCOUNTER — LAB (OUTPATIENT)
Dept: LAB | Facility: CLINIC | Age: 42
End: 2022-09-10
Payer: COMMERCIAL

## 2022-09-10 DIAGNOSIS — O99.810 ABNORMAL GLUCOSE IN PREGNANCY, ANTEPARTUM: ICD-10-CM

## 2022-09-10 LAB
CHOLEST SERPL-MCNC: 215 MG/DL
GLUCOSE 1H P 100 G GLC PO SERPL-MCNC: 188 MG/DL (ref 70–183)
GLUCOSE 2H P 100 G GLC PO SERPL-MCNC: 180 MG/DL (ref 70–155)
GLUCOSE 3H P 100 G GLC PO SERPL-MCNC: 85 MG/DL (ref 70–140)
GLUCOSE P FAST SERPL-MCNC: 96 MG/DL (ref 65–94)
HDLC SERPL-MCNC: 43 MG/DL
LDLC SERPL DIRECT ASSAY-MCNC: 85 MG/DL (ref 0–100)
TRIGL SERPL-MCNC: 533 MG/DL

## 2022-09-10 PROCEDURE — 82951 GLUCOSE TOLERANCE TEST (GTT): CPT

## 2022-09-10 PROCEDURE — 82952 GTT-ADDED SAMPLES: CPT

## 2022-09-12 ENCOUNTER — TELEPHONE (OUTPATIENT)
Dept: OBGYN CLINIC | Facility: CLINIC | Age: 42
End: 2022-09-12

## 2022-09-12 DIAGNOSIS — O24.419 GESTATIONAL DIABETES MELLITUS (GDM) IN THIRD TRIMESTER, GESTATIONAL DIABETES METHOD OF CONTROL UNSPECIFIED: Primary | ICD-10-CM

## 2022-09-12 PROBLEM — O24.410 DIET CONTROLLED GESTATIONAL DIABETES MELLITUS (GDM) IN THIRD TRIMESTER: Status: ACTIVE | Noted: 2022-05-17

## 2022-09-12 NOTE — TELEPHONE ENCOUNTER
Pt returned call and made aware of GTT results and plan for [facility] diabetic education

## 2022-09-13 PROBLEM — Z3A.28 28 WEEKS GESTATION OF PREGNANCY: Status: ACTIVE | Noted: 2022-04-25

## 2022-09-14 ENCOUNTER — TELEMEDICINE (OUTPATIENT)
Dept: PERINATAL CARE | Facility: CLINIC | Age: 42
End: 2022-09-14
Payer: COMMERCIAL

## 2022-09-14 ENCOUNTER — ROUTINE PRENATAL (OUTPATIENT)
Dept: OBGYN CLINIC | Facility: CLINIC | Age: 42
End: 2022-09-14

## 2022-09-14 ENCOUNTER — PATIENT MESSAGE (OUTPATIENT)
Dept: PERINATAL CARE | Facility: CLINIC | Age: 42
End: 2022-09-14

## 2022-09-14 VITALS
SYSTOLIC BLOOD PRESSURE: 122 MMHG | BODY MASS INDEX: 40.23 KG/M2 | WEIGHT: 218.6 LBS | DIASTOLIC BLOOD PRESSURE: 80 MMHG | HEIGHT: 62 IN

## 2022-09-14 DIAGNOSIS — O24.410 DIET CONTROLLED GESTATIONAL DIABETES MELLITUS (GDM) IN THIRD TRIMESTER: ICD-10-CM

## 2022-09-14 DIAGNOSIS — Z3A.29 29 WEEKS GESTATION OF PREGNANCY: ICD-10-CM

## 2022-09-14 DIAGNOSIS — Z3A.28 28 WEEKS GESTATION OF PREGNANCY: Primary | ICD-10-CM

## 2022-09-14 DIAGNOSIS — O24.419 GESTATIONAL DIABETES MELLITUS (GDM) IN THIRD TRIMESTER, GESTATIONAL DIABETES METHOD OF CONTROL UNSPECIFIED: ICD-10-CM

## 2022-09-14 DIAGNOSIS — O99.210 OBESITY IN PREGNANCY, ANTEPARTUM: ICD-10-CM

## 2022-09-14 DIAGNOSIS — O24.410 DIET CONTROLLED GESTATIONAL DIABETES MELLITUS (GDM) IN THIRD TRIMESTER: Primary | ICD-10-CM

## 2022-09-14 LAB
DME PARACHUTE DELIVERY DATE REQUESTED: NORMAL
DME PARACHUTE ITEM DESCRIPTION: NORMAL
DME PARACHUTE ORDER STATUS: NORMAL
DME PARACHUTE SUPPLIER NAME: NORMAL
DME PARACHUTE SUPPLIER PHONE: NORMAL
SL AMB  POCT GLUCOSE, UA: POSITIVE
SL AMB POCT URINE PROTEIN: POSITIVE

## 2022-09-14 PROCEDURE — PNV: Performed by: STUDENT IN AN ORGANIZED HEALTH CARE EDUCATION/TRAINING PROGRAM

## 2022-09-14 PROCEDURE — G0109 DIAB MANAGE TRN IND/GROUP: HCPCS | Performed by: DIETITIAN, REGISTERED

## 2022-09-14 RX ORDER — BLOOD SUGAR DIAGNOSTIC
STRIP MISCELLANEOUS
Qty: 100 STRIP | Refills: 4 | Status: SHIPPED | OUTPATIENT
Start: 2022-09-14 | End: 2022-11-30

## 2022-09-14 RX ORDER — LANCETS 33 GAUGE
EACH MISCELLANEOUS
Qty: 100 EACH | Refills: 4 | Status: SHIPPED | OUTPATIENT
Start: 2022-09-14 | End: 2022-11-30

## 2022-09-14 RX ORDER — BLOOD-GLUCOSE METER
EACH MISCELLANEOUS
Qty: 1 KIT | Refills: 0 | Status: SHIPPED | OUTPATIENT
Start: 2022-09-14 | End: 2022-11-30

## 2022-09-14 NOTE — PROGRESS NOTES
Pt is here for routine ob visit   Shortness of breathe in the morning   Urine +1 protein/+1 glucose   No LOF,Vb,Contractions  +FM   28wks labs completed/failed 3hr glucose   Diabetic pathway appt today   Yellow folder given/reviewed w/ pt  Delivery Consent signed   Tdap next visit   Breast Pump ordered   Peds referral placed

## 2022-09-14 NOTE — PROGRESS NOTES
Virtual Regular Visit    Verification of patient location:  [place], [place]    Patient is located in the following state in which I hold an active license [place]      Assessment/Plan:    Problem List Items Addressed This Visit    None     Visit Diagnoses     Gestational diabetes mellitus (GDM) in third trimester, gestational diabetes method of control unspecified                   Reason for visit is   Chief Complaint   Patient presents with    Virtual Regular Visit        Encounter provider Monet Murray    Provider located at [address] [place] [address] [facility] [id number] [phone]      Recent Visits  No visits were found meeting these conditions  Showing recent visits within past 7 days and meeting all other requirements  Today's Visits  Date Type Provider Dept   09/14/22 1501 [facility]   Showing today's visits and meeting all other requirements  Future Appointments  No visits were found meeting these conditions  Showing future appointments within next 150 days and meeting all other requirements       The patient was identified by name and date of birth  Janes Sanchez was informed that this is a telemedicine visit and that the visit is being conducted through [address] [place] [address] Now and patient was informed that this is a secure, HIPAA-compliant platform  She agrees to proceed     My office door was closed  No one else was in the room  She acknowledged consent and understanding of privacy and security of the video platform  The patient has agreed to participate and understands they can discontinue the visit at any time  Patient is aware this is a billable service  Subjective  Janes Sanchez is a 35 y o  female pregnant patient         HPI     Past Medical History:   Diagnosis Date    Anxiety     Asthma     Depression     Known health problems: none     Pregnancy     resolved: 11/5/2015       Past Surgical History: Procedure Laterality Date    ARTHROSCOPY KNEE      BREAST SURGERY      reduction procedure    REDUCTION MAMMAPLASTY Bilateral        Current Outpatient Medications   Medication Sig Dispense Refill    aspirin (ECOTRIN LOW STRENGTH) 81 mg EC tablet Take 162 mg by mouth daily      ferrous sulfate 324 (65 Fe) mg Take 1 tablet (324 mg total) by mouth 2 (two) times a day before meals 60 tablet 6    FLUoxetine (PROzac) 10 mg capsule TAKE 1 CAPSULE BY MOUTH EVERY DAY 90 capsule 1    mometasone (NASONEX) 50 mcg/act nasal spray SPRAY 2 SPRAYS INTO EACH NOSTRIL EVERY DAY 17 g 3    Prenatal Vit w/Ay-Ylpqudyoy-CD (PNV PO) Take by mouth      Restasis MultiDose 0 05 % ophthalmic emulsion Administer 1 drop to both eyes every 12 (twelve) hours       No current facility-administered medications for this visit  Allergies   Allergen Reactions    Amoxicillin Hives    Other      Annotation - [id number]: paprika  blackberries    Penicillins Hives    Sulfa Antibiotics Hives       Review of Systems    Video Exam    There were no vitals filed for this visit  Physical Exam --not perfomred    I spent 60 minutes directly with the patient during this visit           Thank you for referring your patient to [facility] Diabetes in Pregnancy Program      Emily Carreon is a  35 y o  female who presents today for Group  Class 1  Patient is at 29w0d gestation, Estimated Date of Delivery: 22  Reviewed and updated the following from patients medical record: PMH, Problem List, Allergies, and Current Medications  Visit Diagnosis:  Diet controlled GDM    Discussed with patient pathophysiology of GDM, untreated hyperglycemia in pregnancy and maternal fetal complications including fetal macrosomia,  hypoglycemia, polyhydramnios, increased incidence of  section,  labor, and in severe cases fetal demise and still birth    Discussed importance of blood glucose monitoring, nutrition, and medication if necessary in achieving BG goals  Additional Pregnancy Complications:  Obesity    Labs:    Lab Results   Component Value Date    PES6NBZU20RR 151 (H) 2022       Lab Results   Component Value Date    GLUF 96 (H) 09/10/2022    EMBMEMZ6XD 188 (H) 09/10/2022    KSVCSIG1AR 180 (H) 09/10/2022    KCVRUII6QJ 85 09/10/2022        No components found for: HGA1C    Medications:  No diabetes related medications    Anthropometrics:  Ht Readings from Last 3 Encounters:   22 5' 2" (1 575 m)   22 5' 2" (1 575 m)   07/15/22 5' 2" (1 575 m)     Wt Readings from Last 3 Encounters:   22 99 2 kg (218 lb 9 6 oz)   22 99 5 kg (219 lb 6 4 oz)   08/15/22 98 4 kg (217 lb)     Pre-gravid weight: 93 kg (205 lb)  Pre-gravid BMI: 37 49  Weight Change: 6 169 kg (13 lb 9 6 oz)  Weight gain recommendations: BMI (> 30) 11-20 lbs  Comments: Patient may gain 6 more pounds for the remainder of the pregnancy  Recent Ultra Sound Results:  Date: 7/15/22  Fetal Growth: Normal  CHENG: Normal  Next US date: 10/11/22    Blood Glucose Monitoring:   Glucose Meter: OneTouch Verio Flex  Instructed on testing blood sugars: 4 x per day (Fasting, 2 hour after start of each meal)    Gave instruction on site selection, skin preparation, loading strips and lancet device, meter activation, obtaining blood sample, test strip and lancet disposal and storage, and recording log book entries  Patient has good understanding of material covered and was able to test their own blood sugar in office today       Instruction for reporting blood sugar results weekly via:  Phone: [phone]   OR  My Chart (Message with image attachment, or Glucose Flowsheet)    Goal Blood Sugar Ranges:   Fastin-90 mg/dL  1 hour after the start of each meal: 140 mg/dL or less  2 hours after start of each meal: 120 mg/dL or less    Meal Plan (daily calorie and protein needs):  Calories: 1800 calorie (CHO: 23-19-31-60-41-18) (PRO:2-1-3-1-3-2)    Type of Diet:Regular  Additional Nutrition Concerns: None    Meal Plan Tips:  1  Patient was provided with a meal plan including 3 meals and 3 snacks  2  Discussed appropriate amounts of CHO, PRO, and Fat at each meal and snack  3  Reviewed CHO exchange list, and portion sizes for both CHO and PRO via food models  4  Instruction on how to read a food label  5  Provided suggested meal/snack options to increase nutrition and maintain consistent meal and snack intakes  6  Instructed on how to keep a 3-day food diary to be brought to follow- up appointment  7  Encouraged  patient to eat every 2 0-3 5 hours while awake  8  Encouraged patient to go no longer than 8-10 hours fasting overnight until first meal of the day  Physical Activity:  Discussed benefits of physical activity to optimize blood glucose control, encouraged activity at patient is physically able  Always consult a physician prior to starting an exercise program  Recommend 20-30 minutes daily  Patient Stated Goal: "I will eat 3 meals and 3 snacks each day, including protein at each"    Diabetes Self Management Support Plan outside of ongoing care: Spouse/Family    Learner/s Present:Learners Present: Patient   Barriers to Learning/Change: No Barriers  Expected Compliance: good    Date to report blood sugars: Wednesday, 9/21/22  Class 2 (date): Thursday, 9/22/22    Begin Time: 1:05 PM  End Time: 2:05 PM    It was a pleasure working with them today  Please feel free to call with any questions or concerns      Vanita Louis  Diabetes Educator  [facility]etal [facility]  Diabetes in Pregnancy Program  [address]  [address]

## 2022-09-14 NOTE — PROGRESS NOTES
35 y o  [id number] at 29w0d, here for routine OB visit  Feeling well overall and without concerns  Good FM  Denies LOF, VB, contractions  Denies dysuria, hematuria  No problems with activity  No problems updated      Problem List Items Addressed This Visit        Endocrine    Diet controlled gestational diabetes mellitus (GDM) in third trimester       Other    28 weeks gestation of pregnancy - Primary    Relevant Orders    POCT urine dip (Completed)    Ambulatory Referral to Pediatrics

## 2022-09-21 LAB
DME PARACHUTE DELIVERY DATE ACTUAL: NORMAL
DME PARACHUTE DELIVERY DATE EXPECTED: NORMAL
DME PARACHUTE DELIVERY DATE REQUESTED: NORMAL
DME PARACHUTE ITEM DESCRIPTION: NORMAL
DME PARACHUTE ORDER STATUS: NORMAL
DME PARACHUTE SUPPLIER NAME: NORMAL
DME PARACHUTE SUPPLIER PHONE: NORMAL

## 2022-09-22 ENCOUNTER — TELEMEDICINE (OUTPATIENT)
Dept: PERINATAL CARE | Facility: CLINIC | Age: 42
End: 2022-09-22
Payer: COMMERCIAL

## 2022-09-22 DIAGNOSIS — O24.419 GESTATIONAL DIABETES MELLITUS (GDM) IN THIRD TRIMESTER, GESTATIONAL DIABETES METHOD OF CONTROL UNSPECIFIED: Primary | ICD-10-CM

## 2022-09-22 DIAGNOSIS — Z3A.30 30 WEEKS GESTATION OF PREGNANCY: ICD-10-CM

## 2022-09-22 DIAGNOSIS — O99.210 OBESITY IN PREGNANCY, ANTEPARTUM: ICD-10-CM

## 2022-09-22 PROCEDURE — G0108 DIAB MANAGE TRN  PER INDIV: HCPCS

## 2022-09-22 NOTE — PROGRESS NOTES
Virtual Regular Visit    Verification of patient location:    Patient is located in the following state in which I hold an active license [place]      Assessment/Plan:    Problem List Items Addressed This Visit        Endocrine    Diet controlled gestational diabetes mellitus (GDM) in third trimester - Primary       Other    28 weeks gestation of pregnancy    Obesity in pregnancy, antepartum               Reason for visit is   Chief Complaint   Patient presents with    Gestational Diabetes    Patient Education    Virtual Regular Visit        Encounter provider Estefani Nguyen    Provider located at [address] [place] [id number] [phone]      Recent Visits  No visits were found meeting these conditions  Showing recent visits within past 7 days and meeting all other requirements  Today's Visits  Date Type Provider Dept   09/22/22 1401 [address]   Showing today's visits and meeting all other requirements  Future Appointments  No visits were found meeting these conditions  Showing future appointments within next 150 days and meeting all other requirements       The patient was identified by name and date of birth  Piter Goldberg was informed that this is a telemedicine visit and that the visit is being conducted through [facility] and patient was informed this is a secure, HIPAA-complaint platform  She agrees to proceed     My office door was closed  No one else was in the room  She acknowledged consent and understanding of privacy and security of the video platform  The patient has agreed to participate and understands they can discontinue the visit at any time  Patient is aware this is a billable service  Subjective  Piter Goldberg is a 35 y  o pregnant female          HPI     Past Medical History:   Diagnosis Date    Anxiety     Asthma     Depression     Known health problems: none     Pregnancy     resolved: 11/5/2015       Past Surgical History:   Procedure Laterality Date    ARTHROSCOPY KNEE      BREAST SURGERY      reduction procedure    REDUCTION MAMMAPLASTY Bilateral 1999       Current Outpatient Medications   Medication Sig Dispense Refill    aspirin (ECOTRIN LOW STRENGTH) 81 mg EC tablet Take 162 mg by mouth daily      Blood Glucose Monitoring Suppl (OneTouch Verio Flex System) w/Device KIT Test 4 times daily 1 kit 0    ferrous sulfate 324 (65 Fe) mg Take 1 tablet (324 mg total) by mouth 2 (two) times a day before meals 60 tablet 6    FLUoxetine (PROzac) 10 mg capsule TAKE 1 CAPSULE BY MOUTH EVERY DAY 90 capsule 1    mometasone (NASONEX) 50 mcg/act nasal spray SPRAY 2 SPRAYS INTO EACH NOSTRIL EVERY DAY 17 g 3    OneTouch Delica Lancets 27Z MISC Test 4 times daily 100 each 4    OneTouch Verio test strip Test 4 times daily   strip 4    Prenatal Vit w/Dg-Kjgcypkwe-QV (PNV PO) Take by mouth      Restasis MultiDose 0 05 % ophthalmic emulsion Administer 1 drop to both eyes every 12 (twelve) hours       No current facility-administered medications for this visit  Allergies   Allergen Reactions    Amoxicillin Hives    Other      Annotation - [id number]: paprika  blackberries    Penicillins Hives    Sulfa Antibiotics Hives       Review of Systems    Video Exam    There were no vitals filed for this visit  Physical Exam     I spent 45 minutes with patient today in which greater than 50% of the time was spent in counseling/coordination of care regarding gestational diabetes  Thank you for referring your patient to [facility] Maternal Fetal Medicine Diabetes in Pregnancy Program      Emily Carreon is a  35 y o  female who presents today for Class 2  Patient is at 30w1d gestation, Estimated Date of Delivery: 11/30/22  Reviewed and updated the following from patients medical record: PMH, Problem List, Allergies, and Current Medications      Visit Diagnosis:  GDM in pregnancy method of control unspecified    Additional Pregnancy Complications:  Obesity    Labs:    Lab Results   Component Value Date    [id number] 151 (H) 2022       Lab Results   Component Value Date    GLUF 96 (H) 09/10/2022    IJUISHO5DI 188 (H) 09/10/2022    AKMJRZS4TT 180 (H) 09/10/2022    LMHMEVR8LZ 85 09/10/2022        No components found for: HGA1C    Medications:  No diabetes related medications    Anthropometrics:  Ht Readings from Last 3 Encounters:   22 5' 2" (1 575 m)   22 5' 2" (1 575 m)   07/15/22 5' 2" (1 575 m)     Wt Readings from Last 3 Encounters:   22 99 2 kg (218 lb 9 6 oz)   22 99 5 kg (219 lb 6 4 oz)   08/15/22 98 4 kg (217 lb)     Pre-gravid weight: 93 kg (205 lb)  Pre-gravid BMI: 37 49  Weight Change: 6 169 kg (13 lb 9 6 oz)  Weight gain recommendations: BMI (> 30) 11-20 lbs  Comments: Patient may gain up to 6 pounds for the duration of her pregnancy    Recent Ultra Sound Results:  Date:7/15/22   Fetal Growth:Normal  CHENG: Normal  Next US date: 10/11/22    Blood Glucose Monitoring:  Reinforcement of blood glucose goals and reporting guidelines  Glucose Meter: OneTouch Verio Flex  Testing blood sugars: 4 x per day (Fasting, 2 hour after start of each meal)  Method of reporting blood sugars:Glucose Flowsheet    Report blood sugar results weekly via:  Phone: [phone]   OR  My Chart (Message with image attachment, or Glucose Flowsheet)    Goal Blood Sugar Ranges:   Fastin-90 mg/dL  1 hour after the start of each meal: 140 mg/dL or less  2 hours after start of each meal: 120 mg/dL or less      BG Log:  Review of blood glucose log          Meal Plan:  Calories: 2000 calorie (GMR:[id number]) (PRO: 2/3-1-3/4-1-3/4-2) calories increased due to patient's job as a  and reported increased physical activity on the job     Diet Type: Regular  Additional Nutrition concerns: none    Diet review: Patient is following the meal plan very closely  Diet Instruction:  The patient was instructed on the followin  Individualized meal plan  2  Importance of consistent carbohydrate intake via 3 meals and 3 snacks per day  Advised patient to change bedtime snack to 1 serving CHO (15 gms) and 2-3 ounces of protein  3  Importance of protein as it relates to blood glucose control  4  Encouraged  patient to eat every 2 0-3 5 hours while awake  5  Encouraged patient to go no longer than 8-10 hours fasting overnight until first meal of the day  6  Provided suggested meal/snack options to increase nutrition and maintain consistent meal and snack intakes  7  Medication options reviewed with patient today  Patient is aware that if FBG measurements continue to trend above target goal a follow up appointment will be scheduled  Physical Activity:  Discussed benefits of physical activity to optimize blood glucose control, encouraged activity at patient is physically able  Always consult a physician prior to starting an exercise program  Recommend 20-30 minutes daily  Is patient physically active? Yes active in her job     Sick day Guidelines:   Patient advised that sickness will raise blood sugar and need to continue medication regimen as directed  If blood sugar is > 160 mg/dL twice in one day call doctor  Instructed on what to do when unable to consume normal meal plan  Hypoglycemia & Treatment Guidelines:  Reviewed what hypoglycemia is, signs and symptoms, and how to treat via the 15:15 rule  Post-Partum Guidelines:  Completion of 75 gm CHO 2 hr gtt at 6 weeks post-partum to check for Type 2 DM diagnosis    Breastfeeding Guidelines:  Continue GDM meal plan plus additional 350-500 calories daily  Stay hydrated by drinking 8-10 (8 oz ) fluids daily  Examples of protein and carbohydrate snacks provided      Dining Out & Travel Guidelines:  Patient advised to be prepared with extra diabetes supplies, medications, and snacks, as well as sticking to the same time schedule and portions eaten at home for meals and snacks  Maternal-Fetal Testing:    Ultrasounds- growth scans every 4 weeks  NST- twice weekly starting at 32nd week GA   CHENG- weekly starting at 32 weeks GA    Patient Stated Goal: "I will eat 3 meals and 3 snacks each day, including protein at each"  Goal Assessment: On track    Diabetes Self Management Support Plan outside of ongoing care: Spouse/Family    Learner/s Present:Learners Present: Patient   Barriers to Learning/Change: No Barriers  Expected Compliance: very good    Date to report blood sugars: Requested patient report blood sugars weekly  Follow up date: no follow up scheduled today; pending future FBG measurements    Begin Time: 4:00 PM  End Time: 4:45 PM    It was a pleasure working with them today  Please feel free to call with any questions or concerns      Jody Laurent, RD,LDN,CDE  Diabetes Educator  [facility]  Diabetes in Pregnancy Program  [address]  [address]

## 2022-09-27 ENCOUNTER — ROUTINE PRENATAL (OUTPATIENT)
Dept: OBGYN CLINIC | Facility: CLINIC | Age: 42
End: 2022-09-27
Payer: COMMERCIAL

## 2022-09-27 VITALS — WEIGHT: 219.8 LBS | BODY MASS INDEX: 40.2 KG/M2 | DIASTOLIC BLOOD PRESSURE: 88 MMHG | SYSTOLIC BLOOD PRESSURE: 124 MMHG

## 2022-09-27 DIAGNOSIS — Z34.83 PRENATAL CARE, SUBSEQUENT PREGNANCY, THIRD TRIMESTER: ICD-10-CM

## 2022-09-27 DIAGNOSIS — Z23 NEED FOR TDAP VACCINATION: ICD-10-CM

## 2022-09-27 DIAGNOSIS — Z3A.30 30 WEEKS GESTATION OF PREGNANCY: Primary | ICD-10-CM

## 2022-09-27 DIAGNOSIS — O24.410 DIET CONTROLLED GESTATIONAL DIABETES MELLITUS (GDM) IN THIRD TRIMESTER: ICD-10-CM

## 2022-09-27 DIAGNOSIS — O09.522 MULTIGRAVIDA OF ADVANCED MATERNAL AGE IN SECOND TRIMESTER: ICD-10-CM

## 2022-09-27 LAB
SL AMB  POCT GLUCOSE, UA: NORMAL
SL AMB POCT URINE PROTEIN: NORMAL

## 2022-09-27 PROCEDURE — 90715 TDAP VACCINE 7 YRS/> IM: CPT

## 2022-09-27 PROCEDURE — 90471 IMMUNIZATION ADMIN: CPT

## 2022-09-27 PROCEDURE — PNV: Performed by: PHYSICIAN ASSISTANT

## 2022-09-27 NOTE — PROGRESS NOTES
Diet controlled gestational diabetes mellitus (GDM) in third trimester  Following with diabetic pathways and reporting sugars  States she has had some elevated fasting blood sugars and is working on making dietary changes to correct this  She is aware of recommendation of 44 week delivery given age and GDM status and is agreeable to this plan  Has follow-up growth scan scheduled with MFM on 10/11/2022  Lab Results   Component Value Date    HGBA1C 5 4 12/21/2019       Multigravida of advanced maternal age in second trimester  Follow-up growth scan scheduled Maternal-Fetal Medicine on 10/11/2022    30 weeks gestation of pregnancy  Ismael Mcclendon  is a 35 y o  [id number] @30w6d who presents for routine prenatal visit  She is doing well  Since her 3rd trimester started she notes occasional SOB which is present only in the morning randomly  Feels like she has to take a minute to take a breath  Episodes only lasts a few seconds  It is not associated with any palpitations, chest pain, lightheadedness, dizziness, presyncope  Notes this feeling in the morning only; shortly after waking, prior to eating  She does report reflux in this pregnancy  States she had severe reflux by the end of her last pregnancy  She is currently only using Tums or Prilosec p r n  Suspect symptoms of feeling like breath is taken away could be reflux related  Advised patient to trial Pepcid 20 mg b i d  30 minutes prior to breakfast and dinner regularly for the next week to see if this helps her symptoms  She was advised to call if no improvement  Or if experiences persistent SOB, chest pain, palpitations, dizziness  Reports good fetal movement  Denies LOF, vaginal bleeding, regular uterine contractions, cramping, headaches or visual changes    TDAP received today  Reviewed PTL/Labor precautions and FKC  Given age, BMI, and GDM diagnosis reviewed preeclampsia signs and symptoms in detail with patient as well today    She does have a blood pressure cuff at home  Advised she start monitoring blood pressure at home given added risk factors for preeclampsia in her history  Advised to call with any s/s of PEC or elevated readings >140/90

## 2022-09-27 NOTE — PROGRESS NOTES
PNV  30w6d  [id number]  Patient denies any lof, vb or ctx  Patient has +fm  Patient urine is neg/neg  Patient has no concerns today    Pt having SOB mostly in the morning   Tdap given today tolerated well

## 2022-09-27 NOTE — PATIENT INSTRUCTIONS
Pregnancy at 31 to 34 Weeks   AMBULATORY CARE:   Changes happening with your body: You may continue to have symptoms such as shortness of breath, heartburn, contractions, or swelling of your ankles and feet  You may be gaining about 1 pound a week now  Seek care immediately if:   You develop a severe headache that does not go away  You have new or increased vision changes, such as blurred or spotted vision  You have new or increased swelling in your face or hands  You have vaginal spotting or bleeding  Your water broke or you feel warm water gushing or trickling from your vagina  Call your obstetrician if:   You have more than 5 contractions in 1 hour  You notice any changes in your baby's movements  You have abdominal cramps, pressure, or tightening  You have a change in vaginal discharge  You have chills or a fever  You have vaginal itching, burning, or pain  You have yellow, green, white, or foul-smelling vaginal discharge  You have pain or burning when you urinate, less urine than usual, or pink or bloody urine  You have questions or concerns about your condition or care  How to care for yourself at this stage of your pregnancy:       Eat a variety of healthy foods  Healthy foods include fruits, vegetables, whole-grain breads, low-fat dairy foods, beans, lean meats, and fish  Drink liquids as directed  Ask how much liquid to drink each day and which liquids are best for you  Limit caffeine to less than 200 milligrams each day  Limit your intake of fish to 2 servings each week  Choose fish low in mercury such as canned light tuna, shrimp, salmon, cod, or tilapia  Do not  eat fish high in mercury such as swordfish, tilefish, saeid mackerel, and shark  Manage heartburn  by eating 4 or 5 small meals each day instead of large meals  Avoid spicy food  Manage swelling  by lying down and putting your feet up  Take prenatal vitamins as directed    Your need for certain vitamins and minerals, such as folic acid, increases during pregnancy  Prenatal vitamins provide some of the extra vitamins and minerals you need  Prenatal vitamins may also help to decrease the risk of certain birth defects  Talk to your healthcare provider about exercise  Moderate exercise can help you stay fit  Your healthcare provider will help you plan an exercise program that is safe for you during pregnancy  Do not smoke  Smoking increases your risk of a miscarriage and other health problems during your pregnancy  Smoking can cause your baby to be born too early or weigh less at birth  Ask your healthcare provider for information if you need help quitting  Do not drink alcohol  Alcohol passes from your body to your baby through the placenta  It can affect your baby's brain development and cause fetal alcohol syndrome (FAS)  FAS is a group of conditions that causes mental, behavior, and growth problems  Talk to your healthcare provider before you take any medicines  Many medicines may harm your baby if you take them when you are pregnant  Do not take any medicines, vitamins, herbs, or supplements without first talking to your healthcare provider  Never use illegal or street drugs (such as marijuana or cocaine) while you are pregnant  Safety tips during pregnancy:   Avoid hot tubs and saunas  Do not use a hot tub or sauna while you are pregnant, especially during your first trimester  Hot tubs and saunas may raise your baby's temperature and increase the risk of birth defects  Avoid toxoplasmosis  This is an infection caused by eating raw meat or being around infected cat feces  It can cause birth defects, miscarriages, and other problems  Wash your hands after you touch raw meat  Make sure any meat is well-cooked before you eat it  Avoid raw eggs and unpasteurized milk  Use gloves or ask someone else to clean your cat's litter box while you are pregnant  Changes happening with your baby:  By 34 weeks, your baby may weigh more than 5 pounds  Your baby will be about 12 ½ inches long from the top of the head to the rump (baby's bottom)  Your baby is gaining about ½ pound a week  Your baby's eyes open and close now  Your baby's kicks and movements are more forceful at this time  What you need to know about prenatal care: Your healthcare provider will check your blood pressure and weight  You may also need the following:  A urine test  may also be done to check for sugar and protein  These can be signs of gestational diabetes or infection  Protein in your urine may also be a sign of preeclampsia  Preeclampsia is a condition that can develop during week 20 or later of your pregnancy  It causes high blood pressure, and it can cause problems with your kidneys and other organs  A gestational diabetes screen  may be done  Your healthcare provider may order either a 1-step or 2-step oral glucose tolerance test (OGTT)  1-step OGTT:  Your blood sugar level will be tested after you have not eaten for 8 hours (fasting)  You will then be given a glucose drink  Your level will be tested again 1 hour and 2 hours after you finish the drink  2-step OGTT:  You do not have to fast for the first part of the test  You will have the glucose drink at any time of day  Your blood sugar level will be checked 1 hour later  If your blood sugar is higher than a certain level, another test will be ordered  You will fast and your blood sugar level will be tested  You will have the glucose drink  Your blood will be tested again 1 hour, 2 hours, and 3 hours after you finish the glucose drink  A Tdap vaccine  may be recommended by your healthcare provider  Fundal height  is a measurement of your uterus to check your baby's growth  This number is usually the same as the number of weeks that you have been pregnant   Your healthcare provider may also check your baby's position  Your baby's heart rate  will be checked  Follow up with your obstetrician as directed:  Write down your questions so you remember to ask them during your visits  © Copyright [employer] 2022 Information is for End User's use only and may not be sold, redistributed or otherwise used for commercial purposes  All illustrations and images included in CareNotes® are the copyrighted property of A D A M [employer] Inc  or [employer]   The above information is an  only  It is not intended as medical advice for individual conditions or treatments  Talk to your doctor, nurse or pharmacist before following any medical regimen to see if it is safe and effective for you

## 2022-09-28 DIAGNOSIS — O99.019 MATERNAL ANEMIA, ANTEPARTUM: ICD-10-CM

## 2022-09-28 RX ORDER — FERROUS SULFATE TAB EC 324 MG (65 MG FE EQUIVALENT) 324 (65 FE) MG
TABLET DELAYED RESPONSE ORAL
Qty: 180 TABLET | Refills: 3 | Status: SHIPPED | OUTPATIENT
Start: 2022-09-28

## 2022-09-28 NOTE — ASSESSMENT & PLAN NOTE
Rosangela Pierce  is a 35 y o  [id number] @30w6d who presents for routine prenatal visit  She is doing well  Since her 3rd trimester started she notes occasional SOB which is present only in the morning randomly  Feels like she has to take a minute to take a breath  Episodes only lasts a few seconds  It is not associated with any palpitations, chest pain, lightheadedness, dizziness, presyncope  Notes this feeling in the morning only; shortly after waking, prior to eating  She does report reflux in this pregnancy  States she had severe reflux by the end of her last pregnancy  She is currently only using Tums or Prilosec p r n  Suspect symptoms of feeling like breath is taken away could be reflux related  Advised patient to trial Pepcid 20 mg b i d  30 minutes prior to breakfast and dinner regularly for the next week to see if this helps her symptoms  She was advised to call if no improvement  Or if experiences persistent SOB, chest pain, palpitations, dizziness  Reports good fetal movement  Denies LOF, vaginal bleeding, regular uterine contractions, cramping, headaches or visual changes    TDAP received today  Reviewed PTL/Labor precautions and FKC  Given age, BMI, and GDM diagnosis reviewed preeclampsia signs and symptoms in detail with patient as well today  She does have a blood pressure cuff at home  Advised she start monitoring blood pressure at home given added risk factors for preeclampsia in her history  Advised to call with any s/s of PEC or elevated readings >140/90

## 2022-09-28 NOTE — ASSESSMENT & PLAN NOTE
Following with diabetic pathways and reporting sugars  States she has had some elevated fasting blood sugars and is working on making dietary changes to correct this    She is aware of recommendation of 39 week delivery given age and GDM status and is agreeable to this plan  Has follow-up growth scan scheduled with M on 10/11/2022  Lab Results   Component Value Date    HGBA1C 5 4 12/21/2019

## 2022-10-10 ENCOUNTER — TELEMEDICINE (OUTPATIENT)
Dept: PERINATAL CARE | Facility: CLINIC | Age: 42
End: 2022-10-10
Payer: COMMERCIAL

## 2022-10-10 DIAGNOSIS — Z3A.32 32 WEEKS GESTATION OF PREGNANCY: ICD-10-CM

## 2022-10-10 DIAGNOSIS — O24.414 INSULIN CONTROLLED GESTATIONAL DIABETES MELLITUS (GDM) IN THIRD TRIMESTER: Primary | ICD-10-CM

## 2022-10-10 DIAGNOSIS — O99.210 OBESITY IN PREGNANCY, ANTEPARTUM: ICD-10-CM

## 2022-10-10 PROCEDURE — G0108 DIAB MANAGE TRN  PER INDIV: HCPCS | Performed by: DIETITIAN, REGISTERED

## 2022-10-10 RX ORDER — PEN NEEDLE, DIABETIC 32 GX 1/4"
NEEDLE, DISPOSABLE MISCELLANEOUS
Qty: 100 EACH | Refills: 1 | Status: SHIPPED | OUTPATIENT
Start: 2022-10-10 | End: 2022-10-10

## 2022-10-10 RX ORDER — INSULIN DETEMIR 100 [IU]/ML
30 INJECTION, SOLUTION SUBCUTANEOUS
Qty: 15 ML | Refills: 0 | Status: SHIPPED | OUTPATIENT
Start: 2022-10-10

## 2022-10-10 NOTE — PROGRESS NOTES
Virtual Regular Visit    Verification of patient location: [place], [place]    Patient is located in the following state in which I hold an active license [place]      Assessment/Plan:    Problem List Items Addressed This Visit        Endocrine    Diet controlled gestational diabetes mellitus (GDM) in third trimester - Primary       Other    30 weeks gestation of pregnancy    Obesity in pregnancy, antepartum               Reason for visit is   Chief Complaint   Patient presents with   • Virtual Regular Visit        Encounter provider Andrea Souza    Provider located at [address] [place] [id number] [phone]      Recent Visits  No visits were found meeting these conditions  Showing recent visits within past 7 days and meeting all other requirements  Today's Visits  Date Type Provider Dept   10/10/22 15[address] [facility]   Showing today's visits and meeting all other requirements  Future Appointments  No visits were found meeting these conditions  Showing future appointments within next 150 days and meeting all other requirements       The patient was identified by name and date of birth  Radha Galan was informed that this is a telemedicine visit and that the visit is being conducted through [address] and patient was informed this is a secure, HIPAA-complaint platform  She agrees to proceed     My office door was closed  No one else was in the room  She acknowledged consent and understanding of privacy and security of the video platform  The patient has agreed to participate and understands they can discontinue the visit at any time  Patient is aware this is a billable service  Subjective  Radha Galan is a 35 y o  female Pregnant patient  [facility] DANIELS     Past Medical History:   Diagnosis Date   • Anxiety    • Asthma    • Depression    • Known health problems: none    • Pregnancy     resolved: 11/5/2015       Past Surgical History:   Procedure Laterality Date   • ARTHROSCOPY KNEE     • BREAST SURGERY      reduction procedure   • REDUCTION MAMMAPLASTY Bilateral 1999       Current Outpatient Medications   Medication Sig Dispense Refill   • aspirin (ECOTRIN LOW STRENGTH) 81 mg EC tablet Take 162 mg by mouth daily     • Blood Glucose Monitoring Suppl (OneTouch Verio Flex System) w/Device KIT Test 4 times daily 1 kit 0   • ferrous sulfate 324 (65 Fe) mg TAKE 1 TABLET BY MOUTH 2 TIMES A DAY BEFORE MEALS 180 tablet 3   • FLUoxetine (PROzac) 10 mg capsule TAKE 1 CAPSULE BY MOUTH EVERY DAY 90 capsule 1   • mometasone (NASONEX) 50 mcg/act nasal spray SPRAY 2 SPRAYS INTO EACH NOSTRIL EVERY DAY 17 g 3   • OneTouch Delica Lancets 67E MISC Test 4 times daily 100 each 4   • OneTouch Verio test strip Test 4 times daily   strip 4   • Prenatal Vit w/Rk-Lyhphdssz-VO (PNV PO) Take by mouth     • Restasis MultiDose 0 05 % ophthalmic emulsion Administer 1 drop to both eyes every 12 (twelve) hours       No current facility-administered medications for this visit  Allergies   Allergen Reactions   • Amoxicillin Hives   • Other      Annotation - [id number]: paprika  blackberries   • Penicillins Hives   • Sulfa Antibiotics Hives       Review of Systems    Video Exam    There were no vitals filed for this visit  Physical Exam --not perfomred    I spent 30 minutes directly with the patient during this visit         Thank you for referring your patient to [facility] Maternal Fetal Medicine Diabetes in Pregnancy Program      Emily Carreon is a  35 y o  female who presents today for Insulin Teaching  Patient is at 32w5d gestation, Estimated Date of Delivery: 11/30/22  Please refer to blood sugar log under encounter tab for complete documentation of patient blood glucose levels  Reviewed and updated the following from patients medical record: PMH, Problem List, Allergies, and Current Medications        Visit Diagnosis:  Insulin controlled GDM    Labs:  No components found for: HGA1C    Labs Ordered: Ordered HgA1C and sent script and encouraged her to complete at her earliest convenience      Anthropometrics:  Ht Readings from Last 3 Encounters:   22 5' 2" (1 575 m)   22 5' 2" (1 575 m)   07/15/22 5' 2" (1 575 m)     Wt Readings from Last 3 Encounters:   22 99 7 kg (219 lb 12 8 oz)   22 99 2 kg (218 lb 9 6 oz)   22 99 5 kg (219 lb 6 4 oz)     Pre-gravid weight: 93 kg (205 lb)  Pre-gravid BMI: 37 49  Weight Change: 6 713 kg (14 lb 12 8 oz)  Weight gain recommendations: BMI (> 30) 11-20 lbs  Comments: Patient may gain 5 more pounds for the remainder of the pregnancy  Recent Ultra Sound Results:  Date: 22  Fetal Growth: Normal  CHENG: Normal  Next US date: 10/11/22    BG Log:  Discussed at appointment today        Insulin Education:    Levemir Begin 30 units at bedtime  The patient was instructed on the following:  • Insulin administration times, insulin action  • Hypoglycemia signs, symptoms and treatment  Advised patient to test blood sugar at 3:00 am for first 3 mornings following insulin start to monitor for hypoglycemia  • Increase in maternal-fetal surveillance with insulin initiation  • Side effects of hyperglycemia in pregnancy including macrosomia,  hypoglycemia, polyhydramnios, pre-term labor and stillbirth  • Continue to monitor blood glucoses via fingerstick fasting (goal 60 mg/dl to 90 mg/dl) and two hours post prandial (goal less than 120 mg/dl)  • Non-stress testing two times weekly and CHENG testing beginning at 32 weeks gestation  • Ultrasounds every 4 weeks at the [facility] Maternal Fetal Medicine  • HbA1c every 6 to 8 weeks      Patient Stated Goal: "I will eat 3 meals and 3 snacks each day, including protein at each"  Goal Assessment:  On track    Diabetes Self Management Support Plan outside of ongoing care: Spouse/Family    Date to report blood sugars: Weekly  Follow Up Date: As Needed    Begin Time: 11:30 AM  End Time: 11:56 AM    Gerri Gerard  Diabetes Educator  [facility] Maternal Fetal Medicine  Diabetes in Pregnancy Program  [address],[facility] [place] [address]  [address] [facility]

## 2022-10-11 ENCOUNTER — ULTRASOUND (OUTPATIENT)
Dept: PERINATAL CARE | Facility: CLINIC | Age: 42
End: 2022-10-11
Payer: COMMERCIAL

## 2022-10-11 VITALS
SYSTOLIC BLOOD PRESSURE: 144 MMHG | HEIGHT: 62 IN | DIASTOLIC BLOOD PRESSURE: 78 MMHG | WEIGHT: 220.2 LBS | BODY MASS INDEX: 40.52 KG/M2 | HEART RATE: 102 BPM

## 2022-10-11 DIAGNOSIS — O35.EXX0 PYELECTASIS OF FETUS ON PRENATAL ULTRASOUND: ICD-10-CM

## 2022-10-11 DIAGNOSIS — R03.0 ELEVATED BLOOD PRESSURE READING WITHOUT DIAGNOSIS OF HYPERTENSION: ICD-10-CM

## 2022-10-11 DIAGNOSIS — O24.414 INSULIN CONTROLLED GESTATIONAL DIABETES MELLITUS (GDM) IN THIRD TRIMESTER: Primary | ICD-10-CM

## 2022-10-11 DIAGNOSIS — Z36.89 ENCOUNTER FOR ULTRASOUND TO CHECK FETAL GROWTH: ICD-10-CM

## 2022-10-11 DIAGNOSIS — O09.523 MULTIGRAVIDA OF ADVANCED MATERNAL AGE IN THIRD TRIMESTER: ICD-10-CM

## 2022-10-11 PROCEDURE — 76819 FETAL BIOPHYS PROFIL W/O NST: CPT | Performed by: OBSTETRICS & GYNECOLOGY

## 2022-10-11 PROCEDURE — 76816 OB US FOLLOW-UP PER FETUS: CPT | Performed by: OBSTETRICS & GYNECOLOGY

## 2022-10-11 PROCEDURE — 99214 OFFICE O/P EST MOD 30 MIN: CPT | Performed by: OBSTETRICS & GYNECOLOGY

## 2022-10-11 NOTE — LETTER
2022    Dariusz Knott,  [address] [facility] [address]   [address] [address]    Patient: Anderson Breech   YOB: 1980   Date of Visit: 10/11/2022   Gestational age 28w0d   [facility] this communication: Routine though please note BP elevation x2 today for which I ordered labs; please consider gHTN versus cHTN  Baby is measuring big  Dear Rin Flores,    This patient was seen recently in our  office  The content of my evaluation today is in the ultrasound report under "OB Procedures" tab  Please don't hesitate to contact our office with any concerns or questions       Sincerely,      Susan Hernandez MD  Attending Physician, Daniel

## 2022-10-12 ENCOUNTER — ROUTINE PRENATAL (OUTPATIENT)
Dept: PERINATAL CARE | Facility: CLINIC | Age: 42
End: 2022-10-12
Payer: COMMERCIAL

## 2022-10-12 VITALS
HEIGHT: 62 IN | SYSTOLIC BLOOD PRESSURE: 122 MMHG | DIASTOLIC BLOOD PRESSURE: 70 MMHG | WEIGHT: 219.8 LBS | BODY MASS INDEX: 40.45 KG/M2 | HEART RATE: 90 BPM

## 2022-10-12 DIAGNOSIS — O09.523 MULTIGRAVIDA OF ADVANCED MATERNAL AGE IN THIRD TRIMESTER: ICD-10-CM

## 2022-10-12 DIAGNOSIS — O24.414 INSULIN CONTROLLED GESTATIONAL DIABETES MELLITUS (GDM) IN THIRD TRIMESTER: ICD-10-CM

## 2022-10-12 DIAGNOSIS — Z3A.33 33 WEEKS GESTATION OF PREGNANCY: Primary | ICD-10-CM

## 2022-10-12 PROCEDURE — 59025 FETAL NON-STRESS TEST: CPT | Performed by: STUDENT IN AN ORGANIZED HEALTH CARE EDUCATION/TRAINING PROGRAM

## 2022-10-12 NOTE — PROGRESS NOTES
[address]: Ms Octavia Rodriguez was seen today for NST (found under the pregnancy episode) which I reviewed the RN assessment and agree  Please don't hesitate to contact our office with any concerns or questions    -Dylan Olivarez

## 2022-10-12 NOTE — PROGRESS NOTES
[address]: Ms Magda Kim was seen today for fetal growth assessment ultrasound  See ultrasound report under "OB Procedures" tab  The time spent on this established patient on the encounter date included 5 minutes previsit service time reviewing records and precharting, 15 minutes face-to-face service time counseling regarding results and coordinating care, and  10 minutes charting, totalling 30 minutes  Please don't hesitate to contact our office with any concerns or questions    Lacey Galvan

## 2022-10-12 NOTE — PROGRESS NOTES
Non-Stress Testing:    Non-Stress test, equipment, procedure, and expected outcomes reviewed  Reviewed fetal kick counts and when to call OB  Perlita Dye Verified patient understanding of fetal kick counts with teach back method  Patient reports feeling daily fetal movements  Patient has no questions or concerns  Dr Liam Dang viewed NST strip prior to completion of visit

## 2022-10-12 NOTE — LETTER
NST sleeve cover sheet    Patient name: Piter Goldberg  : 1980  MRN: [id number]    KIARA: Estimated Date of Delivery: 22    Obstetrician: [facility]       Reason(s) for testing: GEST DM    Testing frequency:    _X__ 2x/wk  ___ 1x/wk  ___ Dopplers  ___ BPP?       Last growth scan: __________________________________________

## 2022-10-13 ENCOUNTER — ROUTINE PRENATAL (OUTPATIENT)
Dept: OBGYN CLINIC | Facility: CLINIC | Age: 42
End: 2022-10-13

## 2022-10-13 VITALS
DIASTOLIC BLOOD PRESSURE: 74 MMHG | WEIGHT: 219.6 LBS | SYSTOLIC BLOOD PRESSURE: 126 MMHG | HEIGHT: 62 IN | BODY MASS INDEX: 40.41 KG/M2

## 2022-10-13 DIAGNOSIS — O24.414 INSULIN CONTROLLED GESTATIONAL DIABETES MELLITUS (GDM) IN THIRD TRIMESTER: ICD-10-CM

## 2022-10-13 DIAGNOSIS — R03.0 ELEVATED BLOOD PRESSURE READING WITHOUT DIAGNOSIS OF HYPERTENSION: ICD-10-CM

## 2022-10-13 DIAGNOSIS — Z3A.33 33 WEEKS GESTATION OF PREGNANCY: Primary | ICD-10-CM

## 2022-10-13 LAB
SL AMB  POCT GLUCOSE, UA: NEGATIVE
SL AMB POCT URINE PROTEIN: ABNORMAL

## 2022-10-13 PROCEDURE — PNV: Performed by: STUDENT IN AN ORGANIZED HEALTH CARE EDUCATION/TRAINING PROGRAM

## 2022-10-13 NOTE — ASSESSMENT & PLAN NOTE
Just started insulin  Doing okay with it  Discussed delivery planning, repeat growth and the potential for recommendation for primary CS pending baby size  Discussed risk of shoulder dystocia    Lab Results   Component Value Date    HGBA1C 5 4 12/21/2019

## 2022-10-13 NOTE — PROGRESS NOTES
Insulin controlled gestational diabetes mellitus (GDM) in third trimester  Just started insulin  Doing okay with it  Discussed delivery planning, repeat growth and the potential for recommendation for primary CS pending baby size  Discussed risk of shoulder dystocia  Lab Results   Component Value Date    HGBA1C 5 4 2019       Elevated blood pressure reading without diagnosis of hypertension  Normotensive and asymptomatic  Going to get her labs on Saturday  Reviewed pre e precautions    33 weeks gestation of pregnancy  34 yo  at 33+1 here for routine ob visit  No contractions, leaking or bleeding  Good fetal movement  Has an hour every morning after arriving at work she has the feeling of SOB, does not occur any other time- discussed likely situational/anxiety related and to call if not resolving or occurring more times in the day or starting at rest  Return in 2 wks

## 2022-10-13 NOTE — PROGRESS NOTES
Pt is here for routine ob visit   No concerns at this time  Urine +1 protein/neg glucose   No LOF,VB,Contractions  +FM   UTD tdap/covid vaccines   Declined Flu  Delivery Consent signed

## 2022-10-13 NOTE — ASSESSMENT & PLAN NOTE
34 yo  at 33+1 here for routine ob visit  No contractions, leaking or bleeding  Good fetal movement  Has an hour every morning after arriving at work she has the feeling of SOB, does not occur any other time- discussed likely situational/anxiety related and to call if not resolving or occurring more times in the day or starting at rest  Return in 2 wks

## 2022-10-16 ENCOUNTER — APPOINTMENT (OUTPATIENT)
Dept: LAB | Facility: CLINIC | Age: 42
End: 2022-10-16
Payer: COMMERCIAL

## 2022-10-16 DIAGNOSIS — R03.0 ELEVATED BLOOD PRESSURE READING WITHOUT DIAGNOSIS OF HYPERTENSION: ICD-10-CM

## 2022-10-16 LAB
ALBUMIN SERPL BCP-MCNC: 3 G/DL (ref 3.5–5)
ALP SERPL-CCNC: 66 U/L (ref 34–104)
ALT SERPL W P-5'-P-CCNC: 16 U/L (ref 7–52)
ANION GAP SERPL CALCULATED.3IONS-SCNC: 9 MMOL/L (ref 4–13)
AST SERPL W P-5'-P-CCNC: 17 U/L (ref 13–39)
BILIRUB SERPL-MCNC: 0.35 MG/DL (ref 0.2–1)
BUN SERPL-MCNC: 9 MG/DL (ref 5–25)
CALCIUM ALBUM COR SERPL-MCNC: 9.4 MG/DL (ref 8.3–10.1)
CALCIUM SERPL-MCNC: 8.6 MG/DL (ref 8.4–10.2)
CHLORIDE SERPL-SCNC: 106 MMOL/L (ref 96–108)
CO2 SERPL-SCNC: 20 MMOL/L (ref 21–32)
CREAT SERPL-MCNC: 0.49 MG/DL (ref 0.6–1.3)
CREAT UR-MCNC: 139 MG/DL
ERYTHROCYTE [DISTWIDTH] IN BLOOD BY AUTOMATED COUNT: 13.7 % (ref 11.6–15.1)
EST. AVERAGE GLUCOSE BLD GHB EST-MCNC: 126 MG/DL
GFR SERPL CREATININE-BSD FRML MDRD: 119 ML/MIN/1.73SQ M
GLUCOSE P FAST SERPL-MCNC: 90 MG/DL (ref 65–99)
HBA1C MFR BLD: 6 %
HCT VFR BLD AUTO: 30.7 % (ref 34.8–46.1)
HGB BLD-MCNC: 10 G/DL (ref 11.5–15.4)
MCH RBC QN AUTO: 26.1 PG (ref 26.8–34.3)
MCHC RBC AUTO-ENTMCNC: 32.6 G/DL (ref 31.4–37.4)
MCV RBC AUTO: 80 FL (ref 82–98)
PLATELET # BLD AUTO: 307 THOUSANDS/UL (ref 149–390)
PMV BLD AUTO: 9.9 FL (ref 8.9–12.7)
POTASSIUM SERPL-SCNC: 3.9 MMOL/L (ref 3.5–5.3)
PROT SERPL-MCNC: 5.8 G/DL (ref 6.4–8.4)
PROT UR-MCNC: 36 MG/DL
PROT/CREAT UR: 0.26 MG/G{CREAT} (ref 0–0.1)
RBC # BLD AUTO: 3.83 MILLION/UL (ref 3.81–5.12)
SODIUM SERPL-SCNC: 135 MMOL/L (ref 135–147)
URATE SERPL-MCNC: 3.9 MG/DL (ref 2–7.5)
WBC # BLD AUTO: 7.24 THOUSAND/UL (ref 4.31–10.16)

## 2022-10-16 PROCEDURE — 83036 HEMOGLOBIN GLYCOSYLATED A1C: CPT | Performed by: DIETITIAN, REGISTERED

## 2022-10-16 PROCEDURE — 80053 COMPREHEN METABOLIC PANEL: CPT

## 2022-10-16 PROCEDURE — 84156 ASSAY OF PROTEIN URINE: CPT | Performed by: OBSTETRICS & GYNECOLOGY

## 2022-10-16 PROCEDURE — 84550 ASSAY OF BLOOD/URIC ACID: CPT

## 2022-10-16 PROCEDURE — 82570 ASSAY OF URINE CREATININE: CPT | Performed by: OBSTETRICS & GYNECOLOGY

## 2022-10-16 PROCEDURE — 85027 COMPLETE CBC AUTOMATED: CPT

## 2022-10-16 PROCEDURE — 36415 COLL VENOUS BLD VENIPUNCTURE: CPT | Performed by: DIETITIAN, REGISTERED

## 2022-10-19 ENCOUNTER — ULTRASOUND (OUTPATIENT)
Dept: PERINATAL CARE | Facility: CLINIC | Age: 42
End: 2022-10-19
Payer: COMMERCIAL

## 2022-10-19 VITALS
SYSTOLIC BLOOD PRESSURE: 138 MMHG | HEART RATE: 102 BPM | BODY MASS INDEX: 40.48 KG/M2 | HEIGHT: 62 IN | WEIGHT: 220 LBS | DIASTOLIC BLOOD PRESSURE: 80 MMHG

## 2022-10-19 DIAGNOSIS — Z3A.34 34 WEEKS GESTATION OF PREGNANCY: Primary | ICD-10-CM

## 2022-10-19 DIAGNOSIS — O24.414 INSULIN CONTROLLED GESTATIONAL DIABETES MELLITUS (GDM) IN THIRD TRIMESTER: ICD-10-CM

## 2022-10-19 PROCEDURE — 59025 FETAL NON-STRESS TEST: CPT | Performed by: OBSTETRICS & GYNECOLOGY

## 2022-10-19 PROCEDURE — 76815 OB US LIMITED FETUS(S): CPT | Performed by: OBSTETRICS & GYNECOLOGY

## 2022-10-19 NOTE — PROGRESS NOTES
Repeat Non-Stress Testing:    Patient verbalizes +FM  Pt denies ALL:               Leaking of fluid   Contractions   Vaginal bleeding   Decreased fetal movement    Patient is performing daily kick counts  Patient has no questions or concerns  NST strip reviewed by Dr Bill Christy

## 2022-10-21 ENCOUNTER — ROUTINE PRENATAL (OUTPATIENT)
Dept: PERINATAL CARE | Facility: CLINIC | Age: 42
End: 2022-10-21
Payer: COMMERCIAL

## 2022-10-21 VITALS
BODY MASS INDEX: 40.63 KG/M2 | DIASTOLIC BLOOD PRESSURE: 62 MMHG | HEART RATE: 97 BPM | SYSTOLIC BLOOD PRESSURE: 128 MMHG | WEIGHT: 220.8 LBS | HEIGHT: 62 IN

## 2022-10-21 DIAGNOSIS — O24.414 INSULIN CONTROLLED GESTATIONAL DIABETES MELLITUS (GDM) IN THIRD TRIMESTER: Primary | ICD-10-CM

## 2022-10-21 DIAGNOSIS — Z3A.34 34 WEEKS GESTATION OF PREGNANCY: ICD-10-CM

## 2022-10-21 PROCEDURE — 59025 FETAL NON-STRESS TEST: CPT | Performed by: OBSTETRICS & GYNECOLOGY

## 2022-10-21 NOTE — PROGRESS NOTES
Repeat Non-Stress Testing:    Patient verbalizes +FM  Pt denies ALL:               Leaking of fluid   Contractions   Vaginal bleeding   Decreased fetal movement    Patient is performing daily kick counts  Patient has no questions or concerns     NST strip reviewed by Dr Meredith Varela

## 2022-10-24 ENCOUNTER — ROUTINE PRENATAL (OUTPATIENT)
Dept: PERINATAL CARE | Facility: CLINIC | Age: 42
End: 2022-10-24
Payer: COMMERCIAL

## 2022-10-24 VITALS
BODY MASS INDEX: 40.93 KG/M2 | WEIGHT: 222.4 LBS | HEIGHT: 62 IN | SYSTOLIC BLOOD PRESSURE: 136 MMHG | DIASTOLIC BLOOD PRESSURE: 82 MMHG | HEART RATE: 83 BPM

## 2022-10-24 DIAGNOSIS — O24.414 INSULIN CONTROLLED GESTATIONAL DIABETES MELLITUS (GDM) IN THIRD TRIMESTER: Primary | ICD-10-CM

## 2022-10-24 PROCEDURE — 59025 FETAL NON-STRESS TEST: CPT | Performed by: OBSTETRICS & GYNECOLOGY

## 2022-10-27 ENCOUNTER — ULTRASOUND (OUTPATIENT)
Dept: PERINATAL CARE | Facility: CLINIC | Age: 42
End: 2022-10-27
Payer: COMMERCIAL

## 2022-10-27 ENCOUNTER — ROUTINE PRENATAL (OUTPATIENT)
Dept: OBGYN CLINIC | Facility: CLINIC | Age: 42
End: 2022-10-27

## 2022-10-27 VITALS
HEIGHT: 62 IN | SYSTOLIC BLOOD PRESSURE: 130 MMHG | BODY MASS INDEX: 41.22 KG/M2 | WEIGHT: 224 LBS | HEART RATE: 88 BPM | DIASTOLIC BLOOD PRESSURE: 80 MMHG

## 2022-10-27 VITALS
HEIGHT: 62 IN | BODY MASS INDEX: 41.22 KG/M2 | WEIGHT: 224 LBS | SYSTOLIC BLOOD PRESSURE: 132 MMHG | DIASTOLIC BLOOD PRESSURE: 88 MMHG

## 2022-10-27 DIAGNOSIS — O24.414 INSULIN CONTROLLED GESTATIONAL DIABETES MELLITUS (GDM) IN THIRD TRIMESTER: Primary | ICD-10-CM

## 2022-10-27 DIAGNOSIS — R03.0 ELEVATED BLOOD PRESSURE READING WITHOUT DIAGNOSIS OF HYPERTENSION: ICD-10-CM

## 2022-10-27 DIAGNOSIS — Z3A.35 35 WEEKS GESTATION OF PREGNANCY: ICD-10-CM

## 2022-10-27 LAB
SL AMB  POCT GLUCOSE, UA: POSITIVE
SL AMB POCT URINE PROTEIN: POSITIVE

## 2022-10-27 PROCEDURE — 59025 FETAL NON-STRESS TEST: CPT | Performed by: OBSTETRICS & GYNECOLOGY

## 2022-10-27 PROCEDURE — 76815 OB US LIMITED FETUS(S): CPT | Performed by: OBSTETRICS & GYNECOLOGY

## 2022-10-27 NOTE — ASSESSMENT & PLAN NOTE
Doing okay with insulin  Discussed delivery planning, timing and route  Discussed risk of shoulder dystocia  Growth next week and will review recs and plan at her visit next week    Lab Results   Component Value Date    HGBA1C 6 0 (H) 10/16/2022

## 2022-10-27 NOTE — LETTER
October 27, 2022     Anaid Porter, N[facility] [address] [facility]  [address] [address]    Patient: Candi Howard   YOB: 1980   Date of Visit: 10/27/2022       Dear Dr Funk Mediate: Thank you for referring Candi Howard to me for evaluation  Below are my notes for this consultation  If you have questions, please do not hesitate to call me  I look forward to following your patient along with you  Sincerely,        Stephan Figueroa MD        CC: No Recipients  Stephan Figueroa MD  10/25/2022  4:58 PM  Sign when Signing Visit  Please refer to the [facility] ultrasound report in Ob Procedures for additional information regarding today's visit

## 2022-10-27 NOTE — ASSESSMENT & PLAN NOTE
34 yo  at 35+1 here for routine ob visit  Feeling well besides some swelling in her feet at the end of the day, resolves overnight  BP normal today  No refractory HA, RUQ pain or vision changes  Does have occasional HA related to stress that she manages with normal measures with resolution  GBS next visit  Return in 1 wk

## 2022-10-27 NOTE — PROGRESS NOTES
Insulin controlled gestational diabetes mellitus (GDM) in third trimester  Doing okay with insulin  Discussed delivery planning, timing and route  Discussed risk of shoulder dystocia  Growth next week and will review recs and plan at her visit next week  Lab Results   Component Value Date    HGBA1C 6 0 (H) 10/16/2022       Elevated blood pressure reading without diagnosis of hypertension  Based on prior reading suspect potential cHTN  Discussed potential delivery timing recs  Will follow up [facility] recs after next US and continue discussion  35 weeks gestation of pregnancy  36 yo  at 35+1 here for routine ob visit  Feeling well besides some swelling in her feet at the end of the day, resolves overnight  BP normal today  No refractory HA, RUQ pain or vision changes  Does have occasional HA related to stress that she manages with normal measures with resolution  GBS next visit  Return in 1 wk  From visit with Dr Karli Feldman 10/11  -both blood pressures today were elevated  - 144/88 at 12w5d, followed by BP  elevations at [facility] office (140/82 at 20w2d, 144/70 at 25w2d)  Her school  nurse has checked her blood pressure and notes normal readings, with  124/84 and 126/82 by patient report  -Delivery timing will depend on blood pressures going forward  While  there may be a white coat component of her hypertension, in the setting of  age greater than 36, I am inclined to manage this as gestational (versus  chronic) hypertension   -I recommend growth assessment in 3 weeks time at which time delivery  route can be reviewed, in light of evolving macrosomia today

## 2022-10-27 NOTE — PROGRESS NOTES
Repeat Non-Stress Testing:    Patient verbalizes +FM  Pt denies ALL:               Leaking of fluid   Contractions   Vaginal bleeding   Decreased fetal movement    Patient is performing daily kick counts  Patient has no questions or concerns  NST strip reviewed by Dr Dana Read

## 2022-10-27 NOTE — PROGRESS NOTES
Pt is here for routine ob visit   Swelling lower extremities   Urine +1 protein/ +1 glucose   No LOF,VB,Contractions  + Cramping   +FM   UTD tdap/covid vaccines   Declined Flu   Delivery Consent signed

## 2022-10-27 NOTE — ASSESSMENT & PLAN NOTE
Based on prior reading suspect potential cHTN  Discussed potential delivery timing recs  Will follow up MFM recs after next US and continue discussion

## 2022-10-31 ENCOUNTER — TELEPHONE (OUTPATIENT)
Dept: PEDIATRICS CLINIC | Facility: CLINIC | Age: 42
End: 2022-10-31

## 2022-11-02 ENCOUNTER — ULTRASOUND (OUTPATIENT)
Dept: PERINATAL CARE | Facility: CLINIC | Age: 42
End: 2022-11-02

## 2022-11-02 VITALS
HEART RATE: 90 BPM | SYSTOLIC BLOOD PRESSURE: 128 MMHG | HEIGHT: 62 IN | WEIGHT: 221.6 LBS | BODY MASS INDEX: 40.78 KG/M2 | DIASTOLIC BLOOD PRESSURE: 84 MMHG

## 2022-11-02 DIAGNOSIS — O24.414 INSULIN CONTROLLED GESTATIONAL DIABETES MELLITUS (GDM) IN THIRD TRIMESTER: Primary | ICD-10-CM

## 2022-11-02 DIAGNOSIS — O36.63X0 FETAL MACROSOMIA IN THIRD TRIMESTER, SINGLE OR UNSPECIFIED FETUS: ICD-10-CM

## 2022-11-02 DIAGNOSIS — Z3A.36 36 WEEKS GESTATION OF PREGNANCY: ICD-10-CM

## 2022-11-02 DIAGNOSIS — Z36.89 ENCOUNTER FOR ULTRASOUND TO ASSESS FETAL GROWTH: ICD-10-CM

## 2022-11-02 DIAGNOSIS — O99.210 OBESITY IN PREGNANCY, ANTEPARTUM: ICD-10-CM

## 2022-11-02 DIAGNOSIS — O09.523 MULTIGRAVIDA OF ADVANCED MATERNAL AGE IN THIRD TRIMESTER: ICD-10-CM

## 2022-11-02 DIAGNOSIS — O35.EXX0 PYELECTASIS OF FETUS ON PRENATAL ULTRASOUND: ICD-10-CM

## 2022-11-02 NOTE — PATIENT INSTRUCTIONS
Thank you for choosing us for your  care today  If you have any questions about your ultrasound or care, please do not hesitate to contact us or your primary obstetrician  Some general instructions for your pregnancy are:    Protect against coronavirus: get vaccinated - pregnant women are increased risk of severe COVID  Notify your primary care doctor if you have any symptoms  Exercise: Aim for 22 minutes per day (150 minutes per week) of regular exercise  Walking is great! Nutrition: aim for calcium-rich and iron-rich foods as well as healthy sources of protein  Learn about Preeclampsia: preeclampsia is a common, serious high blood pressure complication in pregnancy  A blood pressure of 509WMQV (systolic or top number) or 88YMHX (diastolic or bottom number) is not normal and needs evaluation by your doctor  Aspirin is sometimes prescribed in early pregnancy to prevent preeclampsia in women with risk factors - ask your obstetrician if you should be on this medication  If you smoke, try to reduce how many cigarettes you smoke or try to quit completely  Do not vape  Other warning signs to watch out for in pregnancy or postpartum: chest pain, obstructed breathing or shortness of breath, seizures, thoughts of hurting yourself or your baby, bleeding, a painful or swollen leg, fever, or headache (see [facility]WH[facility] POST-BIRTH Warning Signs campaign)  If these happen call 911  Itching is also not normal in pregnancy and if you experience this, especially over your hands and feet, potentially worse at night, notify your doctors

## 2022-11-02 NOTE — LETTER
November 2, 2022     Karis Lord, [address] [facility] [address]  [address] [place] [address]    Patient: Ja Perales   YOB: 1980   Date of Visit: 11/2/2022       Dear Dr Rochelle Burrell: Thank you for referring Ja Perales to me for evaluation  Below are my notes for this consultation  If you have questions, please do not hesitate to call me  I look forward to following your patient along with you  Sincerely,        Aquiles Luciano MD        CC: MD Aquiles Oleary MD  11/2/2022  9:43 PM  Sign when Signing Visit  Ja Perales has no complaints today  She is present today with her partner Caryle Ala  She reports regular fetal movements and does not report any problems  She is here today at 36w0d for an ultrasound for fetal growth  Her last baby weighed 8 pounds at birth and upon review of the records she had a 2nd stage that lasted between 1:20 a m  to 3:47 a m  She did not require vacuum or forceps and there was no reported shoulder dystocia  This pregnancy has the same father as her last pregnancy  Problem list:  1  Macrosomia  2  Late onset development of gestational diabetes currently well controlled on 36 units of Levemir q h s  This was diagnosed when her 32 week ultrasound showed a fetus in the greater than 97th percentile  Ultrasound findings: The ultrasound today shows a symmetric fetus with the head and abdomen greater than 97th percentile  Overall the estimated fetal weight is greater than 97th percentile  The abdomen is larger than the fetal head by only 2 centimeters which is considered symmetric  Both the biometrics of the head and the fetal abdomen measure greater than 40 weeks  For the 1st time today we noticed that the fluid in the left renal pelvis is measuring at 8 millimeters  This may be a sign of fetal reflux or may be a normal variant in response to a full fetal bladder       NST is reactive    Pregnancy ultrasound has limitations and is unable to detect all forms of fetal congenital abnormalities  The inaccuracy in the EFW can be off by 1 lb either way in the third trimester  Specific counseling was provided on the following problems:  1  A macrosomic (>4000 gram) symmetric fetus is noted on todays scan  Risk factors for macrosomia include maternal obesity, maternal diabetes, excessive weight gain, family genetics and delivering post dates  I reviewed the inaccuracy of an EFW in the third trimester which can be off by 1 lb either way  Macrosomic babies can have an increased risk of shoulder dystocia which if difficult to resolve can cause danger to the fetus such as hypoxemia, neurologic damage, fetal death or  nerve injury to an arm or broken clavicle or humerus ect  Fracture of the clavicle complicates 5 5-6 8% of all births and typically resolves without permanent sequelae  It is important to note that although macrosomia clearly increases risk, most instances of shoulder dystocia occur unpredictably among newborns of normal birth weight, and most macrosomic newborns do not experience shoulder dystocia  Most of the deliveries diagnosed with a shoulder dystocia can be resolved by the different maneuvers that her provider is trained to do  ACOG recommends a  section in a mother with diabetes and her fetus has an estimated fetal weight of 4500 grams which is 9 pounds 15 ounces  Her baby may attain this weight by 39 weeks if it gains the customary have a pound per week    Follow up recommended:   1  Recommend she continue twice weekly NST and weekly CHENG through [facility]  2  We will review the fetal left renal pelvis on a future CHENG scan  If this normalizes then this is a normal variant    If this continues to be present then recommend a  ultrasound ordered through her pediatrician at least 72 hours after birth as immediately after birth the  is usually in a state of dehydration and this can falsely improve the renal pelvis dilation  3  I encouraged her to discuss with her OB provider at her next visit if she wants to attempt to deliver vaginally or if she would like a scheduled primary  because of the estimated fetal weight which increases her risk for having a baby with shoulder dystocia  Pre visit time reviewing her records   10 minutes  Face to face time 10 minutes  Post visit time on documentation of note, updating her problem list, adding orders and prescriptions 15 minutes  Procedures that were completed today were charged separately  The level of decision making was moderate complexity      Evgeny Esposito

## 2022-11-02 NOTE — PROGRESS NOTES
Saroj Manning has no complaints today  She is present today with her partner Hiram  She reports regular fetal movements and does not report any problems  She is here today at 36w0d for an ultrasound for fetal growth  Her last baby weighed 8 pounds at birth and upon review of the records she had a 2nd stage that lasted between 1:20 a m  to 3:47 a m  She did not require vacuum or forceps and there was no reported shoulder dystocia  This pregnancy has the same father as her last pregnancy  Problem list:  1  Macrosomia  2  Late onset development of gestational diabetes currently well controlled on 36 units of Levemir q h s  This was diagnosed when her 32 week ultrasound showed a fetus in the greater than 97th percentile  Ultrasound findings: The ultrasound today shows a symmetric fetus with the head and abdomen greater than 97th percentile  Overall the estimated fetal weight is greater than 97th percentile  The abdomen is larger than the fetal head by only 2 centimeters which is considered symmetric  Both the biometrics of the head and the fetal abdomen measure greater than 40 weeks  For the 1st time today we noticed that the fluid in the left renal pelvis is measuring at 8 millimeters  This may be a sign of fetal reflux or may be a normal variant in response to a full fetal bladder  NST is reactive    Pregnancy ultrasound has limitations and is unable to detect all forms of fetal congenital abnormalities  The inaccuracy in the EFW can be off by 1 lb either way in the third trimester  Specific counseling was provided on the following problems:  1  A macrosomic (>4000 gram) symmetric fetus is noted on todays scan  Risk factors for macrosomia include maternal obesity, maternal diabetes, excessive weight gain, family genetics and delivering post dates  I reviewed the inaccuracy of an EFW in the third trimester which can be off by 1 lb either way   Macrosomic babies can have an increased risk of shoulder dystocia which if difficult to resolve can cause danger to the fetus such as hypoxemia, neurologic damage, fetal death or  nerve injury to an arm or broken clavicle or humerus ect  Fracture of the clavicle complicates 9 9-0 3% of all births and typically resolves without permanent sequelae  It is important to note that although macrosomia clearly increases risk, most instances of shoulder dystocia occur unpredictably among newborns of normal birth weight, and most macrosomic newborns do not experience shoulder dystocia  Most of the deliveries diagnosed with a shoulder dystocia can be resolved by the different maneuvers that her provider is trained to do  ACOG recommends a  section in a mother with diabetes and her fetus has an estimated fetal weight of 4500 grams which is 9 pounds 15 ounces  Her baby may attain this weight by 39 weeks if it gains the customary have a pound per week    Follow up recommended:   1  Recommend she continue twice weekly NST and weekly CHENG through [facility]  2  We will review the fetal left renal pelvis on a future CHENG scan  If this normalizes then this is a normal variant  If this continues to be present then recommend a  ultrasound ordered through her pediatrician at least 72 hours after birth as immediately after birth the  is usually in a state of dehydration and this can falsely improve the renal pelvis dilation  3  I encouraged her to discuss with her OB provider at her next visit if she wants to attempt to deliver vaginally or if she would like a scheduled primary  because of the estimated fetal weight which increases her risk for having a baby with shoulder dystocia  Pre visit time reviewing her records   10 minutes  Face to face time 10 minutes  Post visit time on documentation of note, updating her problem list, adding orders and prescriptions 15 minutes  Procedures that were completed today were charged separately  The level of decision making was moderate complexity      Kanika Cole

## 2022-11-03 ENCOUNTER — ROUTINE PRENATAL (OUTPATIENT)
Dept: OBGYN CLINIC | Facility: CLINIC | Age: 42
End: 2022-11-03

## 2022-11-03 ENCOUNTER — TELEPHONE (OUTPATIENT)
Dept: OBGYN CLINIC | Facility: MEDICAL CENTER | Age: 42
End: 2022-11-03

## 2022-11-03 VITALS — SYSTOLIC BLOOD PRESSURE: 132 MMHG | WEIGHT: 221.8 LBS | DIASTOLIC BLOOD PRESSURE: 90 MMHG | BODY MASS INDEX: 40.57 KG/M2

## 2022-11-03 DIAGNOSIS — R03.0 ELEVATED BLOOD PRESSURE READING WITHOUT DIAGNOSIS OF HYPERTENSION: ICD-10-CM

## 2022-11-03 DIAGNOSIS — Z23 NEED FOR INFLUENZA VACCINATION: ICD-10-CM

## 2022-11-03 DIAGNOSIS — Z34.83 PRENATAL CARE, SUBSEQUENT PREGNANCY, THIRD TRIMESTER: Primary | ICD-10-CM

## 2022-11-03 DIAGNOSIS — I10 CHRONIC HYPERTENSION: ICD-10-CM

## 2022-11-03 DIAGNOSIS — O35.EXX0 PYELECTASIS OF FETUS ON PRENATAL ULTRASOUND: ICD-10-CM

## 2022-11-03 DIAGNOSIS — O24.414 INSULIN CONTROLLED GESTATIONAL DIABETES MELLITUS (GDM) IN THIRD TRIMESTER: ICD-10-CM

## 2022-11-03 DIAGNOSIS — Z3A.36 36 WEEKS GESTATION OF PREGNANCY: ICD-10-CM

## 2022-11-03 DIAGNOSIS — O99.210 OBESITY IN PREGNANCY, ANTEPARTUM: ICD-10-CM

## 2022-11-03 DIAGNOSIS — O09.523 MULTIGRAVIDA OF ADVANCED MATERNAL AGE IN THIRD TRIMESTER: ICD-10-CM

## 2022-11-03 LAB
SL AMB  POCT GLUCOSE, UA: NORMAL
SL AMB POCT URINE PROTEIN: NORMAL

## 2022-11-03 NOTE — ASSESSMENT & PLAN NOTE
BP elevated today 138/90, decreased to 132/90 at time of discharge  Prior elevated readings suggestive of cHTN  Reports some swelling at end of the day, decreasing over night  No HA, vision change, or RUQ pain  Denies complaints today  We reviewed delivery timing based on this, GDMA2, and findings of macrosomia on US yesterday  We reviewed timing of delivery, risk vs  benefit of induction vs CSection, and risk of shoulder dystocia  Admits to feeling overwhelmed with information  Would like to consider this and discuss with her   Encouraged to call office or RTO with  if has questions or would like to discuss further  To notify office on decision for scheduling otherwise  Agreeable  Would like to recheck PreE labs, which she will plan to go for today  Advised continued daily BP monitoring  Reviewed sx and elevations to report  All questions answered  Precautions given

## 2022-11-03 NOTE — TELEPHONE ENCOUNTER
----- Message from Jazmyne Rodgers sent at 11/3/2022 10:49 AM EDT -----  Regarding: FW: Delivery Question    ----- Message -----  From: Sandra Livingston  Sent: 11/3/2022  10:42 AM EDT  To: [facility]  Subject: Delivery Question                                Dr Abdulaziz Shelton,   I recently went for my growth scan at [facility]  This time they really did not give me any indication of a direction that they are leaning toward  It was a different doctor than last time  I did come this morning and met with Holley Long but I would be more comfortable talking with you about what the next steps are if that is ok

## 2022-11-03 NOTE — ASSESSMENT & PLAN NOTE
Mya Levine  is a 35 y o  [id number] @36w1d who presents for routine prenatal visit  Flu vaccine given today  Tdap - s/p  GBS collected  Cervical check declined  To D/c ASA  Plans to breastfeed  Reports good fetal movement  Denies LOF, vaginal bleeding, regular uterine contractions, cramping, headaches or visual changes  Reviewed PTL precautions and FKC

## 2022-11-03 NOTE — TELEPHONE ENCOUNTER
Called patient after discussion with Dr Bayron Gonzales  Reviewed BP  Agree that Tiffanie Felipe is the most consistent diagnosis  Would plan for delivery 38-39 wks  Based on fetal growth she will be expected to be at 4500g at 38 wks  Based on that EFW and her A2GDM as well as ACOG guidance on offering primary  delivery for suspected macrosomia I would recommend/offere Thom Conner a primary  at 38 wks  She is very agreeable to this  We discussed if planning primary CS that delivery could be considered in the above range, she favors closest to 38 wks  We discussed should labor or another indication for delivery occur prior to 38 wks that her EFW would be <4500g and we could consider a trial of labor but that if she still desired primary CS that would be an okay plan to keep  She understands this as well  Will send to on call docs of her 38th week for approval and agreement and work on getting scheduled

## 2022-11-03 NOTE — PROGRESS NOTES
Patient here for PN visit  She denies any complaints  Good fetal movement  Flu vaccine administered today in left deltoid; tolerated well  VIS given  She handed in birth plan paperwork  GBS collected today

## 2022-11-03 NOTE — ASSESSMENT & PLAN NOTE
Growth US yesterday continues to show symmetrical macrosomia  Reviewed delivery options and discussed risk of shoulder dystocia  To continue twice weekly NST, and weekly CHENG with MFM         Lab Results   Component Value Date    HGBA1C 6 0 (H) 10/16/2022

## 2022-11-03 NOTE — PROGRESS NOTES
Problem List Items Addressed This Visit        Endocrine    Insulin controlled gestational diabetes mellitus (GDM) in third trimester       Other    Multigravida of advanced maternal age in third trimester    39 weeks gestation of pregnancy    Obesity in pregnancy, antepartum    Elevated blood pressure reading without diagnosis of hypertension    Relevant Orders    CBC and differential    Comprehensive metabolic panel    Protein / creatinine ratio, urine    Pyelectasis of fetus on prenatal ultrasound      Other Visit Diagnoses     Prenatal care, subsequent pregnancy, third trimester    -  Primary    Relevant Orders    POCT urine dip (Completed)    Strep B DNA probe, amplification    Need for influenza vaccination        Relevant Orders    influenza vaccine, quadrivalent, 0 5 mL, preservative-free, for adult and pediatric patients 6 mos+ (AFLURIA, FLUARIX, FLULAVAL, FLUZONE) (Completed)

## 2022-11-04 ENCOUNTER — TELEPHONE (OUTPATIENT)
Dept: OBGYN CLINIC | Facility: CLINIC | Age: 42
End: 2022-11-04

## 2022-11-04 ENCOUNTER — ROUTINE PRENATAL (OUTPATIENT)
Dept: PERINATAL CARE | Facility: CLINIC | Age: 42
End: 2022-11-04

## 2022-11-04 ENCOUNTER — APPOINTMENT (OUTPATIENT)
Dept: LAB | Facility: CLINIC | Age: 42
End: 2022-11-04

## 2022-11-04 VITALS
BODY MASS INDEX: 40.93 KG/M2 | HEIGHT: 62 IN | DIASTOLIC BLOOD PRESSURE: 72 MMHG | HEART RATE: 83 BPM | WEIGHT: 222.4 LBS | SYSTOLIC BLOOD PRESSURE: 116 MMHG

## 2022-11-04 DIAGNOSIS — R03.0 ELEVATED BLOOD PRESSURE READING WITHOUT DIAGNOSIS OF HYPERTENSION: ICD-10-CM

## 2022-11-04 DIAGNOSIS — Z3A.36 36 WEEKS GESTATION OF PREGNANCY: Primary | ICD-10-CM

## 2022-11-04 DIAGNOSIS — O24.414 INSULIN CONTROLLED GESTATIONAL DIABETES MELLITUS (GDM) IN THIRD TRIMESTER: ICD-10-CM

## 2022-11-04 DIAGNOSIS — O99.019 MATERNAL ANEMIA, ANTEPARTUM: Primary | ICD-10-CM

## 2022-11-04 LAB
ALBUMIN SERPL BCP-MCNC: 2.9 G/DL (ref 3.5–5)
ALP SERPL-CCNC: 82 U/L (ref 34–104)
ALT SERPL W P-5'-P-CCNC: 16 U/L (ref 7–52)
ANION GAP SERPL CALCULATED.3IONS-SCNC: 7 MMOL/L (ref 4–13)
AST SERPL W P-5'-P-CCNC: 16 U/L (ref 13–39)
BASOPHILS # BLD AUTO: 0.01 THOUSANDS/ÂΜL (ref 0–0.1)
BASOPHILS NFR BLD AUTO: 0 % (ref 0–1)
BILIRUB SERPL-MCNC: 0.3 MG/DL (ref 0.2–1)
BUN SERPL-MCNC: 10 MG/DL (ref 5–25)
CALCIUM ALBUM COR SERPL-MCNC: 9.5 MG/DL (ref 8.3–10.1)
CALCIUM SERPL-MCNC: 8.6 MG/DL (ref 8.4–10.2)
CHLORIDE SERPL-SCNC: 105 MMOL/L (ref 96–108)
CO2 SERPL-SCNC: 22 MMOL/L (ref 21–32)
CREAT SERPL-MCNC: 0.51 MG/DL (ref 0.6–1.3)
CREAT UR-MCNC: 192.8 MG/DL
EOSINOPHIL # BLD AUTO: 0.09 THOUSAND/ÂΜL (ref 0–0.61)
EOSINOPHIL NFR BLD AUTO: 2 % (ref 0–6)
ERYTHROCYTE [DISTWIDTH] IN BLOOD BY AUTOMATED COUNT: 14.3 % (ref 11.6–15.1)
GFR SERPL CREATININE-BSD FRML MDRD: 118 ML/MIN/1.73SQ M
GLUCOSE P FAST SERPL-MCNC: 99 MG/DL (ref 65–99)
HCT VFR BLD AUTO: 30.3 % (ref 34.8–46.1)
HGB BLD-MCNC: 9.7 G/DL (ref 11.5–15.4)
IMM GRANULOCYTES # BLD AUTO: 0.02 THOUSAND/UL (ref 0–0.2)
IMM GRANULOCYTES NFR BLD AUTO: 0 % (ref 0–2)
LYMPHOCYTES # BLD AUTO: 1.78 THOUSANDS/ÂΜL (ref 0.6–4.47)
LYMPHOCYTES NFR BLD AUTO: 30 % (ref 14–44)
MCH RBC QN AUTO: 25.9 PG (ref 26.8–34.3)
MCHC RBC AUTO-ENTMCNC: 32 G/DL (ref 31.4–37.4)
MCV RBC AUTO: 81 FL (ref 82–98)
MONOCYTES # BLD AUTO: 0.51 THOUSAND/ÂΜL (ref 0.17–1.22)
MONOCYTES NFR BLD AUTO: 9 % (ref 4–12)
NEUTROPHILS # BLD AUTO: 3.6 THOUSANDS/ÂΜL (ref 1.85–7.62)
NEUTS SEG NFR BLD AUTO: 59 % (ref 43–75)
NRBC BLD AUTO-RTO: 0 /100 WBCS
PLATELET # BLD AUTO: 292 THOUSANDS/UL (ref 149–390)
PMV BLD AUTO: 9.9 FL (ref 8.9–12.7)
POTASSIUM SERPL-SCNC: 3.7 MMOL/L (ref 3.5–5.3)
PROT SERPL-MCNC: 5.8 G/DL (ref 6.4–8.4)
PROT UR-MCNC: 53 MG/DL
PROT/CREAT UR: 0.27 MG/G{CREAT} (ref 0–0.1)
RBC # BLD AUTO: 3.74 MILLION/UL (ref 3.81–5.12)
SODIUM SERPL-SCNC: 134 MMOL/L (ref 135–147)
WBC # BLD AUTO: 6.01 THOUSAND/UL (ref 4.31–10.16)

## 2022-11-04 RX ORDER — SODIUM CHLORIDE 9 MG/ML
20 INJECTION, SOLUTION INTRAVENOUS ONCE
Status: CANCELLED | OUTPATIENT
Start: 2022-11-11

## 2022-11-04 NOTE — TELEPHONE ENCOUNTER
Procedure to be scheduled (IOL or CS): pCS  KIARA: Estimated Date of Delivery: 11/30/22  Indication for delivery: cHTN, fetal macrosomia >4500g in A2GDM  Requested date (s) of delivery: 11/16  If requested date is unavailable, is there a date by which the pt must be delivered?  11/29  Physician preference: none- all providers on 16,17,18 okay with plan for delivery and aware of patient  If IOL, anticipated method: n/a  If CS, with or without tubal: undecided

## 2022-11-04 NOTE — TELEPHONE ENCOUNTER
Called L & D, per Clover Ross, pt scheduled for pC/S, Wednesday, 11/16/22 at 1300 , medical     Called pt to inform of date & time  Reviewed directions/protocol  Advised L & D will call evening prior to C/S  Routed to Dr Stacie Forrest, [facility], on-call

## 2022-11-04 NOTE — PROGRESS NOTES
Repeat Non-Stress Testing:    Patient verbalizes +FM  Pt denies ALL:               Leaking of fluid   Contractions   Vaginal bleeding   Decreased fetal movement    Patient is performing daily kick counts  Patient has no questions or concerns  NST strip reviewed by Dr Farzad Fitzgerald

## 2022-11-04 NOTE — TELEPHONE ENCOUNTER
Order signed   First infusion scheduled for Friday, 11/11/22 at 130pm , [place] [facility]    Called pt to iinform of date & time- reviewed directions to Unit &  Procedure  Pt to schedule remaining appts prior to D/C

## 2022-11-04 NOTE — TELEPHONE ENCOUNTER
----- Message from Radha So PA-C sent at 11/4/2022  7:52 AM EDT -----  Please call Ayala Said to let her know her labs returned normal for pregnancy  I did notice her anemia seems to be worsening  I believe she is taking an oral iron supplement already  Because of this I would recommend IV iron infusions to optimize her for delivery  The alternative would be to continue oral iron - taking separate from her PNV - in addition to a vitamin C tab, but again, it does not appear to have been effective thus far  Please let me know if she has questions  Venofer order below: This patient has a diagnosis of iron deficiency anemia and needs IV iron treatment - please schedule for infusions     Venofer 200 mg in 100 mL sodium chloride 0 9%  twice weekly   Total of five treatments   Please include infusion appointment request, oncology nurse communication, and monitor patient afterwards

## 2022-11-04 NOTE — TELEPHONE ENCOUNTER
Called pt- pt is willing to try iron infusions to help build up her hemoglobin prior to delivery  Infusion order placed for [facility]     Routed to Dr Sandra Redmond [facility], on-call ) for her signature  Once signed , will scheduled first appt

## 2022-11-05 LAB — GP B STREP DNA SPEC QL NAA+PROBE: NEGATIVE

## 2022-11-07 ENCOUNTER — TELEPHONE (OUTPATIENT)
Dept: PEDIATRICS CLINIC | Facility: CLINIC | Age: 42
End: 2022-11-07

## 2022-11-07 NOTE — TELEPHONE ENCOUNTER
(second attempt) LVM for patient to give me a call back as soon as possible to help choose a Pediatrician for when baby is born

## 2022-11-08 ENCOUNTER — ROUTINE PRENATAL (OUTPATIENT)
Dept: PERINATAL CARE | Facility: CLINIC | Age: 42
End: 2022-11-08

## 2022-11-08 VITALS
HEIGHT: 62 IN | WEIGHT: 225.8 LBS | DIASTOLIC BLOOD PRESSURE: 80 MMHG | BODY MASS INDEX: 41.55 KG/M2 | SYSTOLIC BLOOD PRESSURE: 118 MMHG | HEART RATE: 83 BPM

## 2022-11-08 DIAGNOSIS — O24.414 INSULIN CONTROLLED GESTATIONAL DIABETES MELLITUS (GDM) IN THIRD TRIMESTER: ICD-10-CM

## 2022-11-08 DIAGNOSIS — Z3A.36 36 WEEKS GESTATION OF PREGNANCY: Primary | ICD-10-CM

## 2022-11-08 NOTE — PROGRESS NOTES
Repeat Non-Stress Testing:    Patient verbalizes +FM  Pt denies ALL:               Leaking of fluid   Contractions   Vaginal bleeding   Decreased fetal movement    Patient is performing daily kick counts  Patient has no questions or concerns  NST strip reviewed by Dr Gary Adams

## 2022-11-10 ENCOUNTER — ULTRASOUND (OUTPATIENT)
Dept: PERINATAL CARE | Facility: CLINIC | Age: 42
End: 2022-11-10

## 2022-11-10 VITALS
BODY MASS INDEX: 41.77 KG/M2 | SYSTOLIC BLOOD PRESSURE: 108 MMHG | DIASTOLIC BLOOD PRESSURE: 70 MMHG | HEIGHT: 62 IN | HEART RATE: 83 BPM | WEIGHT: 227 LBS

## 2022-11-10 DIAGNOSIS — Z3A.37 37 WEEKS GESTATION OF PREGNANCY: ICD-10-CM

## 2022-11-10 DIAGNOSIS — O24.414 INSULIN CONTROLLED GESTATIONAL DIABETES MELLITUS (GDM) IN THIRD TRIMESTER: Primary | ICD-10-CM

## 2022-11-10 PROBLEM — R03.0 ELEVATED BLOOD PRESSURE READING: Status: RESOLVED | Noted: 2022-04-25 | Resolved: 2022-11-10

## 2022-11-10 PROBLEM — E66.9 OBESITY (BMI 35.0-39.9 WITHOUT COMORBIDITY): Status: RESOLVED | Noted: 2022-07-20 | Resolved: 2022-11-10

## 2022-11-10 PROBLEM — O35.EXX0 PYELECTASIS OF FETUS ON PRENATAL ULTRASOUND: Status: RESOLVED | Noted: 2022-11-02 | Resolved: 2022-11-10

## 2022-11-10 NOTE — PATIENT INSTRUCTIONS
Thank you for choosing us for your  care today  If you have any questions about your ultrasound or care, please do not hesitate to contact us or your primary obstetrician  Some general instructions for your pregnancy are:    Protect against coronavirus: get vaccinated - pregnant women are increased risk of severe COVID  Notify your primary care doctor if you have any symptoms  Exercise: Aim for 22 minutes per day (150 minutes per week) of regular exercise  Walking is great! Nutrition: aim for calcium-rich and iron-rich foods as well as healthy sources of protein  Learn about Preeclampsia: preeclampsia is a common, serious high blood pressure complication in pregnancy  A blood pressure of 171KXPT (systolic or top number) or 13UFVL (diastolic or bottom number) is not normal and needs evaluation by your doctor  Aspirin is sometimes prescribed in early pregnancy to prevent preeclampsia in women with risk factors - ask your obstetrician if you should be on this medication  If you smoke, try to reduce how many cigarettes you smoke or try to quit completely  Do not vape  Other warning signs to watch out for in pregnancy or postpartum: chest pain, obstructed breathing or shortness of breath, seizures, thoughts of hurting yourself or your baby, bleeding, a painful or swollen leg, fever, or headache (see [employer] POST-BIRTH Warning Signs campaign)  If these happen call 911  Itching is also not normal in pregnancy and if you experience this, especially over your hands and feet, potentially worse at night, notify your doctors

## 2022-11-10 NOTE — LETTER
November 10, 2022     Jani Laguerre, [address]    Patient: Melody Quiroga   YOB: 1980   Date of Visit: 11/10/2022       Dear Dr Lenny Lacy: Thank you for referring Melody Quiroga to me for evaluation  Below are my notes for this consultation  If you have questions, please do not hesitate to call me  I look forward to following your patient along with you  Sincerely,        Stephy Gillette MD        CC: No Recipients  KEEGAN Kaur  11/10/2022  6:27 PM  Attested  [address] [address]: Ms Yan Carpio was seen today at 37w1d for NST (found under the pregnancy episode) which I reviewed the RN assessment and agree, and CHENG (see ultrasound report under OB procedures tab)  See ultrasound report under "OB Procedures" tab  Her sugars are in target ranges  She is scheduled for repeat  on 22  She was counseled by Dr Lia Avaloso for polyhydramnios noted today  Please don't hesitate to contact our office with any concerns or questions   -KEEGAN Kaur      Attestation signed by Stephy Gillette MD at 11/10/2022  6:27 PM:  I was the collaborating physician for Melody Quiroga  I acknowledge the AP's documentation and services provided  Finding of mild polyhydramnios versus top normal amniotic fluid reviewed with Dmitriy Torres, which does not alter delivery timing recommendations     Stephy Gillette MD 11/10/22

## 2022-11-10 NOTE — PROGRESS NOTES
Repeat Non-Stress Testing:    Patient verbalizes +FM  Pt denies ALL:               Leaking of fluid   Contractions   Vaginal bleeding   Decreased fetal movement    Patient is performing daily kick counts  Patient has no questions or concerns     NST strip reviewed by Kitty Francis

## 2022-11-10 NOTE — PROGRESS NOTES
[address] [facility]: Ms Alley Koehler was seen today at 37w1d for NST (found under the pregnancy episode) which I reviewed the RN assessment and agree, and CHENG (see ultrasound report under OB procedures tab)  See ultrasound report under "OB Procedures" tab  Her sugars are in target ranges  She is scheduled for repeat  on 22  She was counseled by Dr Bay Ralph for polyhydramnios noted today   Please don't hesitate to contact our office with any concerns or questions   -KEEGAN Bowden

## 2022-11-11 ENCOUNTER — ROUTINE PRENATAL (OUTPATIENT)
Dept: OBGYN CLINIC | Facility: CLINIC | Age: 42
End: 2022-11-11

## 2022-11-11 ENCOUNTER — HOSPITAL ENCOUNTER (OUTPATIENT)
Dept: INFUSION CENTER | Facility: HOSPITAL | Age: 42
End: 2022-11-11

## 2022-11-11 VITALS — BODY MASS INDEX: 41.34 KG/M2 | SYSTOLIC BLOOD PRESSURE: 122 MMHG | DIASTOLIC BLOOD PRESSURE: 82 MMHG | WEIGHT: 226 LBS

## 2022-11-11 DIAGNOSIS — Z34.83 PRENATAL CARE, SUBSEQUENT PREGNANCY, THIRD TRIMESTER: Primary | ICD-10-CM

## 2022-11-11 DIAGNOSIS — I10 CHRONIC HYPERTENSION: ICD-10-CM

## 2022-11-11 DIAGNOSIS — O36.63X0 FETAL MACROSOMIA IN THIRD TRIMESTER, SINGLE OR UNSPECIFIED FETUS: ICD-10-CM

## 2022-11-11 DIAGNOSIS — Z3A.37 37 WEEKS GESTATION OF PREGNANCY: ICD-10-CM

## 2022-11-11 DIAGNOSIS — O24.414 INSULIN CONTROLLED GESTATIONAL DIABETES MELLITUS (GDM) IN THIRD TRIMESTER: ICD-10-CM

## 2022-11-11 DIAGNOSIS — O99.019 MATERNAL ANEMIA, ANTEPARTUM: Primary | ICD-10-CM

## 2022-11-11 LAB
SL AMB  POCT GLUCOSE, UA: NORMAL
SL AMB POCT URINE PROTEIN: NORMAL

## 2022-11-11 RX ORDER — SODIUM CHLORIDE 9 MG/ML
20 INJECTION, SOLUTION INTRAVENOUS ONCE
Status: CANCELLED | OUTPATIENT
Start: 2022-11-13

## 2022-11-11 RX ORDER — SODIUM CHLORIDE 9 MG/ML
20 INJECTION, SOLUTION INTRAVENOUS ONCE
Status: COMPLETED | OUTPATIENT
Start: 2022-11-11 | End: 2022-11-11

## 2022-11-11 RX ADMIN — SODIUM CHLORIDE 200 MG: 900 INJECTION, SOLUTION INTRAVENOUS at 13:41

## 2022-11-11 RX ADMIN — SODIUM CHLORIDE 20 ML/HR: 0.9 INJECTION, SOLUTION INTRAVENOUS at 13:41

## 2022-11-11 NOTE — ASSESSMENT & PLAN NOTE
Primary  scheduled for 2022  Salpingectomy consent signed today   GBS negative  Reviewed PTL/Labor precautions and FKC

## 2022-11-11 NOTE — PROGRESS NOTES
PNV  37w2d  [id number]  Patient denies any lof, vb or ctx  Patient has +fm  Patient urine is -/-  Flu - completed  GBS -   Patient has no concerns today

## 2022-11-11 NOTE — PROGRESS NOTES
Pt presents today for routine OB visit (rescheduled from [address] today)  Pregnancy complicated by insulin controlled GDM, cHTN, AMA, fetal macrosomia, BMI 41  She has primary  scheduled for next week 22  Consent signed for BS today as she does not have another appt prior to her   Antiseptic skin cleanser given today as well  She is interested in b/l salpingectomy at the time of her   She is aware this is permanent sterilization  We discussed risks, benefits, alternatives to bilateral salpingectomy  We discussed risks including bleeding, infection, damage to surrounding organs, death  Reviewed increased risk of ectopic pregnancy if becomes pregnant after salpingectomy  Benefits of bilateral saplpingectomy include sterilization and decreased risk ovarian cancer  Patient wishes to proceed  Consents signed today  Reports good fetal movement  Denies LOF, vaginal bleeding, regular uterine contractions, cramping, headaches or visual changes  Insulin controlled gestational diabetes mellitus (GDM) in third trimester  Has twice weekly NST and weekly CHENG  Reporting to diabetic pathways  Lab Results   Component Value Date    HGBA1C 6 0 (H) 10/16/2022       37 weeks gestation of pregnancy  Primary  scheduled for 2022  Salpingectomy consent signed today   GBS negative  Reviewed PTL/Labor precautions and FKC            Fetal macrosomia in third trimester  Has primary CS scheduled for 22    Chronic hypertension  38 wk delivery planned

## 2022-11-11 NOTE — ASSESSMENT & PLAN NOTE
Has twice weekly NST and weekly CHENG  Reporting to diabetic pathways  Lab Results   Component Value Date    HGBA1C 6 0 (H) 10/16/2022

## 2022-11-14 ENCOUNTER — HOSPITAL ENCOUNTER (OUTPATIENT)
Dept: INFUSION CENTER | Facility: HOSPITAL | Age: 42
Discharge: HOME | End: 2022-11-14

## 2022-11-14 VITALS
DIASTOLIC BLOOD PRESSURE: 73 MMHG | TEMPERATURE: 97.4 F | OXYGEN SATURATION: 98 % | HEART RATE: 88 BPM | SYSTOLIC BLOOD PRESSURE: 152 MMHG

## 2022-11-14 DIAGNOSIS — O99.019 MATERNAL ANEMIA, ANTEPARTUM: Primary | ICD-10-CM

## 2022-11-14 RX ORDER — SODIUM CHLORIDE 9 MG/ML
20 INJECTION, SOLUTION INTRAVENOUS ONCE
OUTPATIENT
Start: 2022-11-18

## 2022-11-14 RX ORDER — SODIUM CHLORIDE 9 MG/ML
20 INJECTION, SOLUTION INTRAVENOUS ONCE
Status: COMPLETED | OUTPATIENT
Start: 2022-11-14 | End: 2022-11-14

## 2022-11-14 RX ADMIN — IRON SUCROSE 200 MG: 20 INJECTION, SOLUTION INTRAVENOUS at 10:56

## 2022-11-14 RX ADMIN — SODIUM CHLORIDE 20 ML/HR: 0.9 INJECTION, SOLUTION INTRAVENOUS at 10:54

## 2022-11-14 NOTE — PROGRESS NOTES
Patient tolerated venofer today without complications, pt states she is having scheduled  on 22, no more appts scheduled for venofer then

## 2022-11-15 NOTE — PRE-PROCEDURE INSTRUCTIONS
Phone call to patient for pre-operative instructions prior to     Pt was instructed to arrive 1100, 2 hours before her scheduled 1300 OR time  (If the patient is RH negative, she should be told to come in 2 1/2 hours before scheduled OR)    Pt instructed to remain NPO after midnight  *This includes gum, water and hard candy  *If patient takes AM meds (e g hypertensive meds) they may take these meds with a sip of water  *Pt will take 1/2 dose levemir Tuesday night (18u) and will take her prozac with a sip of water in the morning    *This should not include medications like prenatal vitamins Aspirin or colace  Pt was instructed to buy and use a pre-surgical wash containing chlorhexidine the night before and the morning of her scheduled  and to wear clean clothing after the wash  Pt may shave the night before if she wishes  Pt was asked not to wear any jewelry and to leave all of her valuables at home  Pt was asked to leave all of her larger bags and suitcases in the car to be brought in after she is assigned a post partum room  Pt was informed that she may have 1 support person in the OR/PACU area of of the current visiting policies

## 2022-11-16 ENCOUNTER — ANESTHESIA (INPATIENT)
Dept: LABOR AND DELIVERY | Facility: HOSPITAL | Age: 42
End: 2022-11-16

## 2022-11-16 ENCOUNTER — ANESTHESIA EVENT (INPATIENT)
Dept: LABOR AND DELIVERY | Facility: HOSPITAL | Age: 42
End: 2022-11-16

## 2022-11-16 ENCOUNTER — HOSPITAL ENCOUNTER (INPATIENT)
Facility: HOSPITAL | Age: 42
LOS: 2 days | Discharge: HOME | End: 2022-11-18
Attending: OBSTETRICS & GYNECOLOGY | Admitting: OBSTETRICS & GYNECOLOGY

## 2022-11-16 DIAGNOSIS — Z98.891 S/P CESAREAN SECTION: ICD-10-CM

## 2022-11-16 DIAGNOSIS — O10.919 CHRONIC HYPERTENSION DURING PREGNANCY, ANTEPARTUM: ICD-10-CM

## 2022-11-16 DIAGNOSIS — O09.523 MULTIGRAVIDA OF ADVANCED MATERNAL AGE IN THIRD TRIMESTER: Primary | ICD-10-CM

## 2022-11-16 DIAGNOSIS — O24.419 GESTATIONAL DIABETES MELLITUS (GDM) IN THIRD TRIMESTER, GESTATIONAL DIABETES METHOD OF CONTROL UNSPECIFIED: ICD-10-CM

## 2022-11-16 PROBLEM — Z98.890 PONV (POSTOPERATIVE NAUSEA AND VOMITING): Status: ACTIVE | Noted: 2022-11-16

## 2022-11-16 PROBLEM — Z3A.38 38 WEEKS GESTATION OF PREGNANCY: Status: ACTIVE | Noted: 2022-04-25

## 2022-11-16 PROBLEM — R11.2 PONV (POSTOPERATIVE NAUSEA AND VOMITING): Status: ACTIVE | Noted: 2022-11-16

## 2022-11-16 LAB
ABO GROUP BLD: NORMAL
ALBUMIN SERPL BCP-MCNC: 3 G/DL (ref 3.5–5)
ALP SERPL-CCNC: 84 U/L (ref 34–104)
ALT SERPL W P-5'-P-CCNC: 16 U/L (ref 7–52)
AMPHETAMINES SERPL QL SCN: NEGATIVE
ANION GAP SERPL CALCULATED.3IONS-SCNC: 11 MMOL/L (ref 4–13)
AST SERPL W P-5'-P-CCNC: 19 U/L (ref 13–39)
BARBITURATES UR QL: NEGATIVE
BASE EXCESS BLDCOA CALC-SCNC: -1.5 MMOL/L (ref 3–11)
BASE EXCESS BLDCOV CALC-SCNC: -1.5 MMOL/L (ref 1–9)
BENZODIAZ UR QL: NEGATIVE
BILIRUB SERPL-MCNC: 0.32 MG/DL (ref 0.2–1)
BLD GP AB SCN SERPL QL: NEGATIVE
BUN SERPL-MCNC: 9 MG/DL (ref 5–25)
CALCIUM ALBUM COR SERPL-MCNC: 9 MG/DL (ref 8.3–10.1)
CALCIUM SERPL-MCNC: 8.2 MG/DL (ref 8.4–10.2)
CHLORIDE SERPL-SCNC: 106 MMOL/L (ref 96–108)
CO2 SERPL-SCNC: 19 MMOL/L (ref 21–32)
COCAINE UR QL: NEGATIVE
CREAT SERPL-MCNC: 0.48 MG/DL (ref 0.6–1.3)
CREAT UR-MCNC: 134.5 MG/DL
ERYTHROCYTE [DISTWIDTH] IN BLOOD BY AUTOMATED COUNT: 15.2 % (ref 11.6–15.1)
ERYTHROCYTE [DISTWIDTH] IN BLOOD BY AUTOMATED COUNT: 15.4 % (ref 11.6–15.1)
GFR SERPL CREATININE-BSD FRML MDRD: 120 ML/MIN/1.73SQ M
GLUCOSE SERPL-MCNC: 67 MG/DL (ref 65–140)
GLUCOSE SERPL-MCNC: 76 MG/DL (ref 65–140)
HCO3 BLDCOA-SCNC: 24.8 MMOL/L (ref 17.3–27.3)
HCO3 BLDCOV-SCNC: 23.1 MMOL/L (ref 12.2–28.6)
HCT VFR BLD AUTO: 28.7 % (ref 34.8–46.1)
HCT VFR BLD AUTO: 30.2 % (ref 34.8–46.1)
HGB BLD-MCNC: 9.1 G/DL (ref 11.5–15.4)
HGB BLD-MCNC: 9.8 G/DL (ref 11.5–15.4)
MCH RBC QN AUTO: 26 PG (ref 26.8–34.3)
MCH RBC QN AUTO: 26.1 PG (ref 26.8–34.3)
MCHC RBC AUTO-ENTMCNC: 31.7 G/DL (ref 31.4–37.4)
MCHC RBC AUTO-ENTMCNC: 32.5 G/DL (ref 31.4–37.4)
MCV RBC AUTO: 80 FL (ref 82–98)
MCV RBC AUTO: 83 FL (ref 82–98)
METHADONE UR QL: NEGATIVE
O2 CT VFR BLDCOA CALC: 9.9 ML/DL
OPIATES UR QL SCN: NEGATIVE
OXYCODONE+OXYMORPHONE UR QL SCN: NEGATIVE
OXYHGB MFR BLDCOA: 46.9 %
OXYHGB MFR BLDCOV: 56.1 %
PCO2 BLDCOA: 47.8 MM[HG] (ref 30–60)
PCO2 BLDCOV: 39 MM HG (ref 27–43)
PCP UR QL: NEGATIVE
PH BLDCOA: 7.33 [PH] (ref 7.23–7.43)
PH BLDCOV: 7.39 [PH] (ref 7.19–7.49)
PLATELET # BLD AUTO: 273 THOUSANDS/UL (ref 149–390)
PLATELET # BLD AUTO: 299 THOUSANDS/UL (ref 149–390)
PMV BLD AUTO: 10.2 FL (ref 8.9–12.7)
PMV BLD AUTO: 10.6 FL (ref 8.9–12.7)
PO2 BLDCOA: 20 MM HG (ref 5–25)
PO2 BLDCOV: 22.4 MM HG (ref 15–45)
POTASSIUM SERPL-SCNC: 3.8 MMOL/L (ref 3.5–5.3)
PROT SERPL-MCNC: 5.8 G/DL (ref 6.4–8.4)
PROT UR-MCNC: 38 MG/DL
PROT/CREAT UR: 0.28 MG/G{CREAT} (ref 0–0.1)
RBC # BLD AUTO: 3.48 MILLION/UL (ref 3.81–5.12)
RBC # BLD AUTO: 3.77 MILLION/UL (ref 3.81–5.12)
RH BLD: POSITIVE
SAO2 % BLDCOV: 11.4 ML/DL
SODIUM SERPL-SCNC: 136 MMOL/L (ref 135–147)
SPECIMEN EXPIRATION DATE: NORMAL
THC UR QL: NEGATIVE
WBC # BLD AUTO: 14.72 THOUSAND/UL (ref 4.31–10.16)
WBC # BLD AUTO: 7.52 THOUSAND/UL (ref 4.31–10.16)

## 2022-11-16 PROCEDURE — 4A1HXCZ MONITORING OF PRODUCTS OF CONCEPTION, CARDIAC RATE, EXTERNAL APPROACH: ICD-10-PCS | Performed by: OBSTETRICS & GYNECOLOGY

## 2022-11-16 PROCEDURE — 0UB70ZZ EXCISION OF BILATERAL FALLOPIAN TUBES, OPEN APPROACH: ICD-10-PCS | Performed by: OBSTETRICS & GYNECOLOGY

## 2022-11-16 PROCEDURE — 0UB90ZZ EXCISION OF UTERUS, OPEN APPROACH: ICD-10-PCS | Performed by: OBSTETRICS & GYNECOLOGY

## 2022-11-16 RX ORDER — DEXAMETHASONE SODIUM PHOSPHATE 4 MG/ML
INJECTION, SOLUTION INTRA-ARTICULAR; INTRALESIONAL; INTRAMUSCULAR; INTRAVENOUS; SOFT TISSUE AS NEEDED
Status: DISCONTINUED | OUTPATIENT
Start: 2022-11-16 | End: 2022-11-16

## 2022-11-16 RX ORDER — FENTANYL CITRATE 50 UG/ML
INJECTION, SOLUTION INTRAMUSCULAR; INTRAVENOUS AS NEEDED
Status: DISCONTINUED | OUTPATIENT
Start: 2022-11-16 | End: 2022-11-16

## 2022-11-16 RX ORDER — ENOXAPARIN SODIUM 100 MG/ML
40 INJECTION SUBCUTANEOUS EVERY 12 HOURS
Status: DISCONTINUED | OUTPATIENT
Start: 2022-11-16 | End: 2022-11-16

## 2022-11-16 RX ORDER — OXYTOCIN/RINGER'S LACTATE 30/500 ML
62.5 PLASTIC BAG, INJECTION (ML) INTRAVENOUS ONCE
Status: COMPLETED | OUTPATIENT
Start: 2022-11-16 | End: 2022-11-17

## 2022-11-16 RX ORDER — MIDAZOLAM HYDROCHLORIDE 2 MG/2ML
INJECTION, SOLUTION INTRAMUSCULAR; INTRAVENOUS AS NEEDED
Status: DISCONTINUED | OUTPATIENT
Start: 2022-11-16 | End: 2022-11-16

## 2022-11-16 RX ORDER — DOCUSATE SODIUM 100 MG/1
100 CAPSULE, LIQUID FILLED ORAL 2 TIMES DAILY
Status: DISCONTINUED | OUTPATIENT
Start: 2022-11-16 | End: 2022-11-18 | Stop reason: HOSPADM

## 2022-11-16 RX ORDER — KETOROLAC TROMETHAMINE 30 MG/ML
INJECTION, SOLUTION INTRAMUSCULAR; INTRAVENOUS AS NEEDED
Status: DISCONTINUED | OUTPATIENT
Start: 2022-11-16 | End: 2022-11-16

## 2022-11-16 RX ORDER — ONDANSETRON 2 MG/ML
4 INJECTION INTRAMUSCULAR; INTRAVENOUS EVERY 6 HOURS PRN
Status: DISPENSED | OUTPATIENT
Start: 2022-11-16 | End: 2022-11-17

## 2022-11-16 RX ORDER — ONDANSETRON 2 MG/ML
INJECTION INTRAMUSCULAR; INTRAVENOUS AS NEEDED
Status: DISCONTINUED | OUTPATIENT
Start: 2022-11-16 | End: 2022-11-16

## 2022-11-16 RX ORDER — HYDROMORPHONE HCL/PF 1 MG/ML
SYRINGE (ML) INJECTION AS NEEDED
Status: DISCONTINUED | OUTPATIENT
Start: 2022-11-16 | End: 2022-11-16

## 2022-11-16 RX ORDER — OXYCODONE HYDROCHLORIDE 10 MG/1
10 TABLET ORAL EVERY 4 HOURS PRN
Status: DISCONTINUED | OUTPATIENT
Start: 2022-11-17 | End: 2022-11-18 | Stop reason: HOSPADM

## 2022-11-16 RX ORDER — IBUPROFEN 600 MG/1
600 TABLET ORAL EVERY 6 HOURS PRN
Status: DISCONTINUED | OUTPATIENT
Start: 2022-11-17 | End: 2022-11-18 | Stop reason: HOSPADM

## 2022-11-16 RX ORDER — SODIUM CHLORIDE, SODIUM LACTATE, POTASSIUM CHLORIDE, CALCIUM CHLORIDE 600; 310; 30; 20 MG/100ML; MG/100ML; MG/100ML; MG/100ML
125 INJECTION, SOLUTION INTRAVENOUS CONTINUOUS
Status: DISCONTINUED | OUTPATIENT
Start: 2022-11-16 | End: 2022-11-18

## 2022-11-16 RX ORDER — HYDROMORPHONE HCL/PF 1 MG/ML
0.4 SYRINGE (ML) INJECTION
Status: DISCONTINUED | OUTPATIENT
Start: 2022-11-16 | End: 2022-11-18

## 2022-11-16 RX ORDER — MORPHINE SULFATE 1 MG/ML
INJECTION, SOLUTION EPIDURAL; INTRATHECAL; INTRAVENOUS AS NEEDED
Status: DISCONTINUED | OUTPATIENT
Start: 2022-11-16 | End: 2022-11-16

## 2022-11-16 RX ORDER — PROPOFOL 10 MG/ML
INJECTION, EMULSION INTRAVENOUS CONTINUOUS PRN
Status: DISCONTINUED | OUTPATIENT
Start: 2022-11-16 | End: 2022-11-16

## 2022-11-16 RX ORDER — OXYTOCIN/RINGER'S LACTATE 30/500 ML
PLASTIC BAG, INJECTION (ML) INTRAVENOUS CONTINUOUS PRN
Status: DISCONTINUED | OUTPATIENT
Start: 2022-11-16 | End: 2022-11-16

## 2022-11-16 RX ORDER — FENTANYL CITRATE/PF 50 MCG/ML
25 SYRINGE (ML) INJECTION
Status: DISCONTINUED | OUTPATIENT
Start: 2022-11-16 | End: 2022-11-18

## 2022-11-16 RX ORDER — CALCIUM CARBONATE 200(500)MG
1000 TABLET,CHEWABLE ORAL DAILY PRN
Status: DISCONTINUED | OUTPATIENT
Start: 2022-11-16 | End: 2022-11-18 | Stop reason: HOSPADM

## 2022-11-16 RX ORDER — FLUOXETINE 10 MG/1
10 CAPSULE ORAL DAILY
Status: DISCONTINUED | OUTPATIENT
Start: 2022-11-17 | End: 2022-11-18 | Stop reason: HOSPADM

## 2022-11-16 RX ORDER — SIMETHICONE 80 MG
80 TABLET,CHEWABLE ORAL 4 TIMES DAILY PRN
Status: DISCONTINUED | OUTPATIENT
Start: 2022-11-16 | End: 2022-11-18 | Stop reason: HOSPADM

## 2022-11-16 RX ORDER — NALBUPHINE HCL 10 MG/ML
10 AMPUL (ML) INJECTION EVERY 4 HOURS PRN
Status: ACTIVE | OUTPATIENT
Start: 2022-11-16 | End: 2022-11-17

## 2022-11-16 RX ORDER — NALOXONE HYDROCHLORIDE 0.4 MG/ML
0.1 INJECTION, SOLUTION INTRAMUSCULAR; INTRAVENOUS; SUBCUTANEOUS
Status: ACTIVE | OUTPATIENT
Start: 2022-11-16 | End: 2022-11-17

## 2022-11-16 RX ORDER — KETOROLAC TROMETHAMINE 30 MG/ML
30 INJECTION, SOLUTION INTRAMUSCULAR; INTRAVENOUS EVERY 6 HOURS
Status: COMPLETED | OUTPATIENT
Start: 2022-11-16 | End: 2022-11-17

## 2022-11-16 RX ORDER — SODIUM CHLORIDE 9 MG/ML
125 INJECTION, SOLUTION INTRAVENOUS CONTINUOUS
Status: DISCONTINUED | OUTPATIENT
Start: 2022-11-16 | End: 2022-11-16

## 2022-11-16 RX ORDER — CLINDAMYCIN PHOSPHATE 900 MG/50ML
900 INJECTION INTRAVENOUS ONCE
Status: COMPLETED | OUTPATIENT
Start: 2022-11-16 | End: 2022-11-16

## 2022-11-16 RX ORDER — OXYCODONE HYDROCHLORIDE 5 MG/1
5 TABLET ORAL EVERY 4 HOURS PRN
Status: DISCONTINUED | OUTPATIENT
Start: 2022-11-17 | End: 2022-11-18 | Stop reason: HOSPADM

## 2022-11-16 RX ORDER — ONDANSETRON 2 MG/ML
4 INJECTION INTRAMUSCULAR; INTRAVENOUS ONCE AS NEEDED
Status: DISCONTINUED | OUTPATIENT
Start: 2022-11-16 | End: 2022-11-18

## 2022-11-16 RX ORDER — BUPIVACAINE HYDROCHLORIDE 7.5 MG/ML
INJECTION, SOLUTION INTRASPINAL AS NEEDED
Status: DISCONTINUED | OUTPATIENT
Start: 2022-11-16 | End: 2022-11-16

## 2022-11-16 RX ORDER — ENOXAPARIN SODIUM 100 MG/ML
40 INJECTION SUBCUTANEOUS EVERY 12 HOURS
Status: DISCONTINUED | OUTPATIENT
Start: 2022-11-17 | End: 2022-11-18 | Stop reason: HOSPADM

## 2022-11-16 RX ORDER — HYDROMORPHONE HCL/PF 1 MG/ML
1 SYRINGE (ML) INJECTION EVERY 2 HOUR PRN
Status: DISCONTINUED | OUTPATIENT
Start: 2022-11-16 | End: 2022-11-16

## 2022-11-16 RX ORDER — HYDROMORPHONE HCL/PF 1 MG/ML
1 SYRINGE (ML) INJECTION EVERY 2 HOUR PRN
Status: ACTIVE | OUTPATIENT
Start: 2022-11-16 | End: 2022-11-17

## 2022-11-16 RX ORDER — ACETAMINOPHEN 325 MG/1
650 TABLET ORAL EVERY 6 HOURS SCHEDULED
Status: DISCONTINUED | OUTPATIENT
Start: 2022-11-16 | End: 2022-11-18 | Stop reason: HOSPADM

## 2022-11-16 RX ADMIN — KETOROLAC TROMETHAMINE 30 MG: 30 INJECTION, SOLUTION INTRAMUSCULAR at 13:56

## 2022-11-16 RX ADMIN — SODIUM CHLORIDE 999 ML/HR: 0.9 INJECTION, SOLUTION INTRAVENOUS at 11:45

## 2022-11-16 RX ADMIN — Medication 20 MG: at 14:43

## 2022-11-16 RX ADMIN — Medication 62.5 MILLI-UNITS/MIN: at 17:38

## 2022-11-16 RX ADMIN — BUPIVACAINE HYDROCHLORIDE IN DEXTROSE 1.6 ML: 7.5 INJECTION, SOLUTION SUBARACHNOID at 13:23

## 2022-11-16 RX ADMIN — ACETAMINOPHEN 650 MG: 325 TABLET ORAL at 18:06

## 2022-11-16 RX ADMIN — Medication 250 MILLI-UNITS/MIN: at 13:45

## 2022-11-16 RX ADMIN — ONDANSETRON 4 MG: 2 INJECTION INTRAMUSCULAR; INTRAVENOUS at 13:50

## 2022-11-16 RX ADMIN — SODIUM CHLORIDE 125 ML/HR: 0.9 INJECTION, SOLUTION INTRAVENOUS at 13:01

## 2022-11-16 RX ADMIN — KETOROLAC TROMETHAMINE 30 MG: 30 INJECTION, SOLUTION INTRAMUSCULAR at 19:29

## 2022-11-16 RX ADMIN — FENTANYL CITRATE 15 MCG: 50 INJECTION, SOLUTION INTRAMUSCULAR; INTRAVENOUS at 13:23

## 2022-11-16 RX ADMIN — MIDAZOLAM 2 MG: 1 INJECTION INTRAMUSCULAR; INTRAVENOUS at 14:10

## 2022-11-16 RX ADMIN — Medication 30 MG: at 14:49

## 2022-11-16 RX ADMIN — FENTANYL CITRATE 50 MCG: 50 INJECTION, SOLUTION INTRAMUSCULAR; INTRAVENOUS at 13:56

## 2022-11-16 RX ADMIN — TRANEXAMIC ACID 1000 MG: 1 INJECTION, SOLUTION INTRAVENOUS at 14:24

## 2022-11-16 RX ADMIN — GENTAMICIN SULFATE 80 MG: 40 INJECTION, SOLUTION INTRAMUSCULAR; INTRAVENOUS at 13:22

## 2022-11-16 RX ADMIN — SODIUM CHLORIDE: 0.9 INJECTION, SOLUTION INTRAVENOUS at 15:03

## 2022-11-16 RX ADMIN — HYDROMORPHONE HYDROCHLORIDE 1 MG: 1 INJECTION, SOLUTION INTRAMUSCULAR; INTRAVENOUS; SUBCUTANEOUS at 14:57

## 2022-11-16 RX ADMIN — SODIUM CHLORIDE, SODIUM LACTATE, POTASSIUM CHLORIDE, AND CALCIUM CHLORIDE 125 ML/HR: .6; .31; .03; .02 INJECTION, SOLUTION INTRAVENOUS at 22:31

## 2022-11-16 RX ADMIN — FENTANYL CITRATE 50 MCG: 50 INJECTION, SOLUTION INTRAMUSCULAR; INTRAVENOUS at 14:18

## 2022-11-16 RX ADMIN — PROPOFOL 50 MCG/KG/MIN: 10 INJECTION, EMULSION INTRAVENOUS at 14:30

## 2022-11-16 RX ADMIN — MORPHINE SULFATE 0.15 MG: 1 INJECTION, SOLUTION EPIDURAL; INTRATHECAL; INTRAVENOUS at 13:23

## 2022-11-16 RX ADMIN — PHENYLEPHRINE HYDROCHLORIDE 50 MCG/MIN: 50 INJECTION INTRAVENOUS at 13:26

## 2022-11-16 RX ADMIN — DEXAMETHASONE SODIUM PHOSPHATE 4 MG: 4 INJECTION INTRA-ARTICULAR; INTRALESIONAL; INTRAMUSCULAR; INTRAVENOUS; SOFT TISSUE at 13:51

## 2022-11-16 RX ADMIN — ONDANSETRON 4 MG: 2 INJECTION INTRAMUSCULAR; INTRAVENOUS at 19:29

## 2022-11-16 RX ADMIN — DOCUSATE SODIUM 100 MG: 100 CAPSULE, LIQUID FILLED ORAL at 18:06

## 2022-11-16 RX ADMIN — CLINDAMYCIN PHOSPHATE 900 MG: 900 INJECTION, SOLUTION INTRAVENOUS at 13:20

## 2022-11-16 RX ADMIN — SODIUM CHLORIDE, SODIUM LACTATE, POTASSIUM CHLORIDE, AND CALCIUM CHLORIDE 125 ML/HR: .6; .31; .03; .02 INJECTION, SOLUTION INTRAVENOUS at 16:42

## 2022-11-16 NOTE — H&P
H&P Exam - Obstetrics   Verito Lizarraga 35 y o  female MRN: [id number]  Unit/Bed#: LD PACU-02 Encounter: [id number]      History of Present Illness     Chief Complaint:     HPI:  Ranjan Avalos is a 35 y o   female with an KIARA of 2022, by Last Menstrual Period at 38w0d weeks gestation who is being admitted for primary  section for suspected fetal macrosomia in the setting of A2GDM    Contractions: no  Loss of fluid: no  Vaginal bleeding: no  Fetal movement: yes    She is a [facility] patient  PREGNANCY COMPLICATIONS:   1) As below    OB History    Para Term  AB Living   4 1 1   2 1   SAB IAB Ectopic Multiple Live Births   2       1      # Outcome Date GA Lbr Ramses/2nd Weight Sex Delivery Anes PTL Lv   4 Current            3 Term 09/26/15 39w0d  3629 g (8 lb) F Vag-Spont EPI  ANGELICA   2 SAB            1 SAB                Baby complications/comments: macrosomia    Review of Systems   Constitutional: Negative for chills and fever  Eyes: Negative for visual disturbance  Respiratory: Negative for chest tightness and shortness of breath  Cardiovascular: Negative for chest pain and palpitations  Gastrointestinal: Negative for abdominal pain  Genitourinary: Negative for vaginal bleeding, vaginal discharge and vaginal pain  Musculoskeletal: Negative for back pain  Neurological: Negative for headaches         Historical Information   Past Medical History:   Diagnosis Date   • Anxiety    • Asthma    • Depression    • Known health problems: none    • Pregnancy     resolved: 2015     Past Surgical History:   Procedure Laterality Date   • ARTHROSCOPY KNEE     • BREAST SURGERY      reduction procedure   • REDUCTION MAMMAPLASTY Bilateral      Social History   Social History     Substance and Sexual Activity   Alcohol Use Not Currently    Comment: social     Social History     Substance and Sexual Activity   Drug Use Yes   • Types: Marijuana    Comment: medical marijuana- not in months     Social History     Tobacco Use   Smoking Status Never   Smokeless Tobacco Never     Family History: non-contributory    Meds/Allergies    {  Medications Prior to Admission   Medication   • Blood Glucose Monitoring Suppl (OneTouch Verio Flex System) w/Device KIT   • ferrous sulfate 324 (65 Fe) mg   • FLUoxetine (PROzac) 10 mg capsule   • Insulin Pen Needle (BD Pen Needle Alexus 2nd Gen) 32G X 4 MM MISC   • Levemir FlexTouch 100 units/mL injection pen   • OneTouch Delica Lancets 25G MISC   • OneTouch Verio test strip   • Prenatal Vit w/To-Mgpuckuor-PT (PNV PO)   • Restasis MultiDose 0 05 % ophthalmic emulsion   • mometasone (NASONEX) 50 mcg/act nasal spray        Allergies   Allergen Reactions   • Amoxicillin Hives   • Other      Annotation - [id number]: paprika  blackberries   • Penicillins Hives   • Sulfa Antibiotics Hives       OBJECTIVE:    Vitals:   BP (!) 167/103 (BP Location: Right arm)   Pulse 72   Temp 98 3 °F (36 8 °C) (Oral)   Resp 18   Ht 5' 2" (1 575 m)   Wt 103 kg (226 lb)   LMP 02/23/2022   SpO2 98%   BMI 41 34 kg/m²   Body mass index is 41 34 kg/m²  Physical Exam  Constitutional:       Appearance: She is obese  HENT:      Head: Normocephalic  Eyes:      Conjunctiva/sclera: Conjunctivae normal    Cardiovascular:      Rate and Rhythm: Normal rate  Pulses: Normal pulses  Pulmonary:      Effort: Pulmonary effort is normal    Abdominal:      Palpations: Abdomen is soft  Tenderness: There is no abdominal tenderness  Skin:     General: Skin is warm  Neurological:      Mental Status: She is alert and oriented to person, place, and time     Psychiatric:         Mood and Affect: Mood normal        Fetal heart rate:   135 bpm with moderate variability, accelerations, no decelerations    [place]:   irritability    EFW: 3952 grams - 8 lbs 11 oz    (>97%) on 11/2/22    Prenatal Labs:   Blood Type:   Lab Results   Component Value Date/Time    ABO Grouping B 05/14/2022 09:40 AM    ABO Grouping B 09/25/2015 10:53 AM     , D (Rh type):   Lab Results   Component Value Date/Time    Rh Factor Positive 05/14/2022 09:40 AM    Rh Factor Positive 09/25/2015 10:53 AM     , Antibody Screen:   Lab Results   Component Value Date/Time    Antibody Screen Negative 09/25/2015 10:53 AM    , HCT/HGB:   Lab Results   Component Value Date/Time    Hematocrit 30 3 (L) 11/04/2022 06:49 AM    Hematocrit 26 9 (L) 09/27/2015 05:26 AM    Hemoglobin 9 7 (L) 11/04/2022 06:49 AM    Hemoglobin 8 9 (L) 09/27/2015 05:26 AM      , MCV:   Lab Results   Component Value Date/Time    MCV 81 (L) 11/04/2022 06:49 AM    MCV 86 09/27/2015 05:26 AM      , Platelets:   Lab Results   Component Value Date/Time    Platelets 479 01/49/7478 06:49 AM    Platelets 024 95/22/3926 05:26 AM      , 1 hour Glucola:   Lab Results   Component Value Date/Time    GLUCOSE 1 HR 50 GM GLUC CHALLENGE-PREG  07/08/2015 08:32 AM    Glucose 151 (H) 05/14/2022 09:37 AM   , 3 hour GTT:   Lab Results   Component Value Date/Time    Glucose, GTT - 3 Hour 85 09/10/2022 12:11 PM     , Rubella:   Lab Results   Component Value Date/Time    RUBELLA IGG QUANTITATION 155 1 03/16/2015 07:09 AM    Rubella IgG Quant >175 0 05/14/2022 09:37 AM        , VDRL/RPR:   Lab Results   Component Value Date/Time    RPR SCREEN Nonreactive 09/25/2015 10:53 AM    RPR Non-Reactive 09/03/2022 07:55 AM      , Hep B:   Lab Results   Component Value Date/Time    HEPATITIS B SURFACE ANTIGEN Nonreactive (NR 03/16/2015 07:09 AM    Hepatitis B Surface Ag Non-reactive 05/14/2022 09:37 AM     , Hep C: non-reactive    , HIV:   Lab Results   Component Value Date/Time    HIV-1/HIV-2 Ab Non-Reactive 05/14/2022 09:37 AM     , Chlamydia: negative   , Gonorrhea:   Lab Results   Component Value Date/Time    N gonorrhoeae, DNA Probe Negative 05/23/2022 02:52 PM     , Group B Strep:    Lab Results   Component Value Date/Time    Strep Grp B PCR Negative 11/03/2022 08:14 AM Invasive Devices     Peripheral Intravenous Line  Duration           Peripheral IV 22 Dorsal (posterior); Left Hand <1 day                  Assessment/Plan     ASSESSMENT:    36 yo  at 38w0d weeks gestation who is being admitted for primary  section for suspected fetal macrosomia in the setting of A2GDM    PLAN:  * 38 weeks gestation of pregnancy  Assessment & Plan  1) Admit  2) CBC, RPR, Blood Type  3) Contraception: bilateral salpingectomy  4) Anesthesia consult for spinal  5) Clindamycin and Gentamicin ppx      Insulin controlled gestational diabetes mellitus (GDM) in third trimester  Assessment & Plan  Lab Results   Component Value Date    HGBA1C 6 0 (H) 10/16/2022       No results for input(s): POCGLU in the last 72 hours  Blood Sugar Average: Last 72 hrs:     POCT on admission  2h gluocla test postpartum    Fetal macrosomia in third trimester  Assessment & Plan  - EFW: 3952 grams - 8 lbs 11 oz    (>97%) on 22  -  section    Chronic hypertension  Assessment & Plan  No medications  CBC, CMP, P/Cr pending  Continue monitoring BPs    This patient will be an INPATIENT and I certify the anticipated length of stay is >2 Midnights        Darrion Garcia MD  2022  12:02 PM

## 2022-11-16 NOTE — ANESTHESIA POSTPROCEDURE EVALUATION
Post-Op Assessment Note    CV Status:  Stable  Pain Score: 0    Pain management: adequate     Mental Status:  Alert and awake   Hydration Status:  Euvolemic   PONV Controlled:  Controlled   Airway Patency:  Patent      Post Op Vitals Reviewed: Yes      Staff: CRNA         No notable events documented      BP   128/59   Temp      Pulse  85   Resp      SpO2   100

## 2022-11-16 NOTE — PLAN OF CARE
Problem: Potential for Falls  Goal: Patient will remain free of falls  Description: INTERVENTIONS:  - Educate patient/family on patient safety including physical limitations  - Instruct patient to call for assistance with activity   - Consult OT/PT to assist with strengthening/mobility   - Keep Call bell within reach  - Keep bed low and locked with side rails adjusted as appropriate  - Keep care items and personal belongings within reach  - Initiate and maintain comfort rounds  - Make Fall Risk Sign visible to staff  - Apply yellow socks and bracelet for high fall risk patients  - Consider moving patient to room near nurses station  Outcome: Progressing     Problem: PAIN - ADULT  Goal: Verbalizes/displays adequate comfort level or baseline comfort level  Description: Interventions:  - Encourage patient to monitor pain and request assistance  - Assess pain using appropriate pain scale  - Administer analgesics based on type and severity of pain and evaluate response  - Implement non-pharmacological measures as appropriate and evaluate response  - Consider cultural and social influences on pain and pain management  - Notify physician/advanced practitioner if interventions unsuccessful or patient reports new pain  Outcome: Progressing     Problem: INFECTION - ADULT  Goal: Absence or prevention of progression during hospitalization  Description: INTERVENTIONS:  - Assess and monitor for signs and symptoms of infection  - Monitor lab/diagnostic results  - Monitor all insertion sites, i e  indwelling lines, tubes, and drains  - Monitor endotracheal if appropriate and nasal secretions for changes in amount and color  - [place] appropriate cooling/warming therapies per order  - Administer medications as ordered  - Instruct and encourage patient and family to use good hand hygiene technique  - Identify and instruct in appropriate isolation precautions for identified infection/condition  Outcome: Progressing  Goal: Absence of fever/infection during neutropenic period  Description: INTERVENTIONS:  - Monitor WBC    Outcome: Progressing     Problem: SAFETY ADULT  Goal: Patient will remain free of falls  Description: INTERVENTIONS:  - Educate patient/family on patient safety including physical limitations  - Instruct patient to call for assistance with activity   - Consult OT/PT to assist with strengthening/mobility   - Keep Call bell within reach  - Keep bed low and locked with side rails adjusted as appropriate  - Keep care items and personal belongings within reach  - Initiate and maintain comfort rounds  - Make Fall Risk Sign visible to staff  - Apply yellow socks and bracelet for high fall risk patients  - Consider moving patient to room near nurses station  Outcome: Progressing     Problem: Knowledge Deficit  Goal: Patient/family/caregiver demonstrates understanding of disease process, treatment plan, medications, and discharge instructions  Description: Complete learning assessment and assess knowledge base    Interventions:  - Provide teaching at level of understanding  - Provide teaching via preferred learning methods  Outcome: Progressing     Problem: DISCHARGE PLANNING  Goal: Discharge to home or other facility with appropriate resources  Description: INTERVENTIONS:  - Identify barriers to discharge w/patient and caregiver  - Arrange for needed discharge resources and transportation as appropriate  - Identify discharge learning needs (meds, wound care, etc )  - Arrange for interpretive services to assist at discharge as needed  - Refer to Case Management Department for coordinating discharge planning if the patient needs post-hospital services based on physician/advanced practitioner order or complex needs related to functional status, cognitive ability, or social support system  Outcome: Progressing     Problem: POSTPARTUM  Goal: Experiences normal postpartum course  Description: INTERVENTIONS:  - Monitor maternal vital signs  - Assess uterine involution and lochia  Outcome: Progressing  Goal: Appropriate maternal -  bonding  Description: INTERVENTIONS:  - Identify family support  - Assess for appropriate maternal/infant bonding   -Encourage maternal/infant bonding opportunities  - Referral to  or  as needed  Outcome: Progressing  Goal: Establishment of infant feeding pattern  Description: INTERVENTIONS:  - Assess breast/bottle feeding  - Refer to lactation as needed  Outcome: Progressing  Goal: Incision(s), wounds(s) or drain site(s) healing without S/S of infection  Description: INTERVENTIONS  - Assess and document dressing, incision, wound bed, drain sites and surrounding tissue  - Provide patient and family education  - Perform skin care/dressing changes every   Outcome: Progressing

## 2022-11-16 NOTE — DISCHARGE SUMMARY
Discharge Summary - Dylan Carlos 35 y o  female MRN: [id number]    Unit/Bed#: LD PACU-02 Encounter: [id number]    Admission Date: 2022     Discharge Date: 2022    Admitting Attending: Araceli Dickens  Delivering Attending: Araceli Dickens  Discharge Attending: Liliane Mena    Pre-delivery Diagnosis:  45 week pregnancy  Fetal macrosomia  A2GDM  AMA    Post-delivery diagnosis:  Same, delivery    Procedures: Primary low transverse  section, abdominal myomectomy, and bilateral salpingectomy    Complications: none apparent     Pt is a 36 yo  who was admitted for 1LTCS for suspected fetal macrosomia at 38w0d  She underwent an  delivery, bilateral salpingectomy and abdominal myomectomy to achieve hemostasis of bleeding subserosal myoma  She delivered a viable male  at 26 on 22  Weight was 8lbs 15oz (4054g) with APGARs of 8 and 9 at 1 and 5 minutes  Her preoperative Hb was 9 9  Her postoperative Hb was 9 1 in PACU and am CBC was 7 4  She received Venofer postpartum  Her postoperative course was uncomplicated  Condition at discharge: good     On day of discharge pain was well controlled, patient was tolerating PO, passing flatus, has not had a bowel movement  She was discharged with standard post partum/ post operative instructions to follow up with her physician in 1 week for an incision check and in 3-6 weeks for a postpartum appointment  Discharge instructions/Information to patient and family:   -Do not place anything (no partner, tampons or douche) in your vagina for 6 weeks    -You may walk for exercise for the first 6 weeks then gradually return to your usual activities    -Please do not drive for 1 week if you have no stitches and for 2 weeks if you have stitches or underwent a  delivery     -You may take baths or shower per your preference    -Please look at your bust (breasts) in the mirror daily and call for redness or tenderness or increased warmth    -Please call us for temperature > 100 4*F or 38* C, worsening pain or a foul discharge       Discharge Medications:   Prenatal vitamin daily for 6 months or the duration of nursing whichever is longer  Motrin 600 mg orally every 6 hours as needed for pain  Tylenol (over the counter) per bottle directions as needed for pain: do NOT use with percocet  Hydrocortisone cream 1% (over the counter) applied 1-2x daily to hemorrhoids as needed  Percocet as needed    Provisions for Follow-Up Care: Follow up with your doctor in 1 week for incision site and blood pressure check       Planned Readmission: no

## 2022-11-16 NOTE — ASSESSMENT & PLAN NOTE
1) Admit  2) CBC, RPR, Blood Type  3) Contraception: bilateral salpingectomy  4) Anesthesia consult for spinal  5) Clindamycin and Gentamicin ppx

## 2022-11-16 NOTE — OP NOTE
OPERATIVE REPORT  PATIENT NAME: Rosezetta Boas    :  1980  MRN: [id number]  Pt Location: [facility] L&D OR ROOM 02    SURGERY DATE: 2022    Surgeon(s) and Role:     * Karlee Call MD - Primary     * Aubrey Fuentes MD - Assisting     * Terrie Fields MD - Assisting     * Ofe Dewey MD - Assisting    Preop Diagnosis:  Chronic hypertension during pregnancy, antepartum [O10 919]  Gestational diabetes mellitus (GDM) in third trimester, gestational diabetes method of control unspecified [O24 419]  Suspected fetal macrosomia    Post-Op Diagnosis Codes:     * Chronic hypertension during pregnancy, antepartum [O10 919]     * Gestational diabetes mellitus (GDM) in third trimester, gestational diabetes method of control unspecified [O24 419]   Abdominal myomectomy    Procedure(s) (LRB):   SECTION () (N/A)  LIGATION/COAGULATION TUBAL (Bilateral)    Specimen(s):  ID Type Source Tests Collected by Time Destination   1 : PRN Tissue Fallopian Tube, Left TISSUE EXAM Karlee Call MD 2022 1403    2 : PRN Tissue Fallopian Tube, Right TISSUE EXAM Karlee Call MD 2022 1403    3 :  Tissue Mass TISSUE EXAM Karlee Call MD 2022 1424    A :  Cord Blood Cord BLOOD GAS, VENOUS, CORD, BLOOD GAS, ARTERIAL, CORD Karlee Call MD 2022 1345    B :  Tissue (Placenta on Hold) OB Only Placenta PLACENTA IN STORAGE Karlee Call MD 2022 1345        Quantitative Blood Loss: 1143 cc    Drains:  Urethral Catheter Double-lumen;Non-latex 16 Fr  (Active)   Number of days: 0       Anesthesia Type: Spinal    Operative Indications:  Chronic hypertension during pregnancy, antepartum [O10 919]  Gestational diabetes mellitus (GDM) in third trimester, gestational diabetes method of control unspecified [O24 419]    Operative Findings:  1  Viable male  at 26 with APGARs of 8 and 9 at 1 and 5 minutes   Fetus weighted 8lb 15 oz   2  Normal intact placenta with eccentrally inserted 3VC  3  Large uterus with multiple fibroids  4  One 5 cm x 5 cm left cornual subserosal fibroid  One 2 cm x 1 cm subserosal fibroid on posterior wall of uterus  5  Normal bilateral tubes and ovaries  Umbilical Cord Venous Blood Gas:  Results from last 7 days   Lab Units 11/16/22  1345   PH COV  7 391   PCO2 COV mm HG 39 0   HCO3 COV mmol/L 23 1   BASE EXC COV mmol/L -1 5*   O2 CT CD VB mL/dL 11 4   O2 HGB, VENOUS CORD % 10 3     Umbilical Cord Arterial Blood Gas:  Results from last 7 days   Lab Units 11/16/22  1345   PH COA  7 333   PCO2 COA  47 8   PO2 COA mm HG 20 0   HCO3 COA mmol/L 24 8   BASE EXC COA mmol/L -1 5*   O2 CONTENT CORD ART ml/dl 9 9   O2 HGB, ARTERIAL CORD % 46 9       Procedure: The patient was taken to the operating room  Spinal was adequately established and Clindamycin and Gentamicin were given for preoperative prophylaxis  The patient was then placed in a supine position with a left lateral tilt  Stinson catheter was placed in sterile fashion  Fetal heart tones were noted to be normal  The patient was then prepped with chloraprep for abdominal and vaginal prep and draped in the usual sterile fashion for a  pfannenstiel skin incision  A time out was performed to confirm correct patient and correct procedure  An incision was made in the skin with a surgical scalpel and sharp dissection was carried out over subsequent layers of tissue including the fascia, followed by the Bovie electrocautery for hemostasis  The fascia was incised at the midline and the fascial incision was extended bilaterally using the curved Torres scissors  The superior edge of the fascial incision was grasped with Kocher clamps, tented up and the underlying rectus muscles were dissected off bluntly and sharply using the curved Torres scissors   The rectus muscles were then divided at midline and the peritoneum was identified, tented up at its upper margin taking care to avoid the bladder, and then entered  The peritoneal incision was extended superiorly and inferiorly  The bladder blade was inserted and a transverse incision was made in the lower uterine segment using a new surgical blade  The uterine incision was extended cephalad and caudal using blunt dissection  The amniotic sac was entered and the amniotic fluid was noted to be clear  The surgeon's hand was placed into the uterine cavity  The fetus was noted to be in JAMES presentation and the presenting part was grasped and delivered through the uterine incision with the assistance of fundal pressure  The infant's oral and nasal passages were bulb suctioned  After delivery the cord was doubly clamped and cut  The infant was then passed off the table to the awaiting  staff  Venous and arterial blood gas, cord blood, and portion of cord was obtained for analysis and routine blood testing  The placenta then delivered manually  The placenta was noted to be intact with a eccentrally inserted three-vessel cord  Oxytocin was administered by IV infusion to enhance uterine contraction  The uterus was exteriorized and cleared of all clots and debris by blunt curretage with a moist lap sponge  The uterine incision was reapproximated using a 0-Vicryl in a running locked fashion  A horizontal imbricating stitch with 0-Vicryl was applied  The uterine incision was examined and noted to be hemostatic  The right fallopian tube was grasped at its fimbriated end with a Amarilis and elevated to visualize the mesosalpinx  Bovie electrocautery was used to ligate along the mesosalpinx, working proximally and taking care to avoid ovarian vasculature  Approximately 2cm from the cornua, electrocautery was used to amputate fallopian tube  2-0 Vicryl suture was used to secure the pedicles  Specimen was sent for pathology  Attention was then turned to the contralateral tube, which was amputated in similar fashion   Good hemostasis was confirmed following salpingectomy  At this point, the posterior cul-de-sac was cleared of all clots  The left cornual fibroid was noted to bleed  Single interrupted sutures were placed and hemostasis was not obtained and the decision to perform abdominal myomectomy was made  The defect was reapproximated using a 0-Vicryl in a running locked fashion  Slow oozing was noted from the defect at which point single interrupted and figure of eight sutures were placed using 2-0 Vicryl  There was noted to be continued bleeding from the defect, and 0-Monocryl suture was used to placed two mattress sutures and hemostasis was achieved  The uterus was replaced into the abdomen and the pericolic gutters were cleared of all clots  The uterine incision and bilateral tubal ligation sites were once again reexamined and noted to be hemostatic  The fascia was then reapproximated using 0 Vicryl in a running nonlocked fashion  The subcutaneous tissue was irrigated and cleared of all clots and debris  Good hemostasis was noted achieved with Bovie electrocautery  The subcutaneous tissue was re-approximated with 3-0 Plain suture  The skin incision was closed with 4-0 Monocryl  Good hemostasis was noted  Telfa, gauze and ABD were placed on top of the skin incision in a sterile fashion as a surgical dressing  All needle, sponge, and instrument counts were noted to be correct x 2 at the end of the procedure  The patient was then cleansed and transferred to the recovery room  Overall, the patient tolerated the procedure well and is currently in stable condition in the PACU with her   Dr Araceli Dickens was present for the entire procedure        Gilson Mccarty MD  DATE: 2022  TIME: 3:59 PM

## 2022-11-16 NOTE — PLAN OF CARE
Problem: Potential for Falls  Goal: Patient will remain free of falls  Description: INTERVENTIONS:  - Educate patient/family on patient safety including physical limitations  - Instruct patient to call for assistance with activity   - Consult OT/PT to assist with strengthening/mobility   - Keep Call bell within reach  - Keep bed low and locked with side rails adjusted as appropriate  - Keep care items and personal belongings within reach  - Initiate and maintain comfort rounds  - Make Fall Risk Sign visible to staff  - Apply yellow socks and bracelet for high fall risk patients  - Consider moving patient to room near nurses station  11/16/2022 1232 by Martínez Max RN  Outcome: Progressing  11/16/2022 1232 by aMrtínez Max RN  Outcome: Progressing     Problem: PAIN - ADULT  Goal: Verbalizes/displays adequate comfort level or baseline comfort level  Description: Interventions:  - Encourage patient to monitor pain and request assistance  - Assess pain using appropriate pain scale  - Administer analgesics based on type and severity of pain and evaluate response  - Implement non-pharmacological measures as appropriate and evaluate response  - Consider cultural and social influences on pain and pain management  - Notify physician/advanced practitioner if interventions unsuccessful or patient reports new pain  11/16/2022 1232 by Martínez Max RN  Outcome: Progressing  11/16/2022 1232 by Martínez Max RN  Outcome: Progressing     Problem: INFECTION - ADULT  Goal: Absence or prevention of progression during hospitalization  Description: INTERVENTIONS:  - Assess and monitor for signs and symptoms of infection  - Monitor lab/diagnostic results  - Monitor all insertion sites, i e  indwelling lines, tubes, and drains  - Monitor endotracheal if appropriate and nasal secretions for changes in amount and color  - [place] appropriate cooling/warming therapies per order  - Administer medications as ordered  - Instruct and encourage patient and family to use good hand hygiene technique  - Identify and instruct in appropriate isolation precautions for identified infection/condition  11/16/2022 1232 by Krishna Armendariz RN  Outcome: Progressing  11/16/2022 1232 by Krishna Armendariz RN  Outcome: Progressing  Goal: Absence of fever/infection during neutropenic period  Description: INTERVENTIONS:  - Monitor WBC    11/16/2022 1232 by Krishna Armendariz RN  Outcome: Progressing  11/16/2022 1232 by Krishna Armendariz RN  Outcome: Progressing     Problem: SAFETY ADULT  Goal: Patient will remain free of falls  Description: INTERVENTIONS:  - Educate patient/family on patient safety including physical limitations  - Instruct patient to call for assistance with activity   - Consult OT/PT to assist with strengthening/mobility   - Keep Call bell within reach  - Keep bed low and locked with side rails adjusted as appropriate  - Keep care items and personal belongings within reach  - Initiate and maintain comfort rounds  - Make Fall Risk Sign visible to staff  - Offer Toileting every 2 Hours, in advance of need  - Initiate/Maintain bed alarm if patient uncooperative and demonstrates unsafe ambulation   - Obtain necessary fall risk management equipment: no-slip socks  - Apply yellow socks and bracelet for high fall risk patients  - Consider moving patient to room near nurses station  11/16/2022 1232 by Krishna Armendariz RN  Outcome: Progressing  11/16/2022 1232 by Krishna Armendariz RN  Outcome: Progressing  Goal: Maintain or return to baseline ADL function  Description: INTERVENTIONS:  -  Assess patient's ability to carry out ADLs; assess patient's baseline for ADL function and identify physical deficits which impact ability to perform ADLs (bathing, care of mouth/teeth, toileting, grooming, dressing, etc )  - Assess/evaluate cause of self-care deficits   - Assess range of motion  - Assess patient's mobility; develop plan if impaired  - Assess patient's need for assistive devices and provide as appropriate  - Encourage maximum independence but intervene and supervise when necessary  - Involve family in performance of ADLs  - Assess for home care needs following discharge   - Consider OT consult to assist with ADL evaluation and planning for discharge  - Provide patient education as appropriate  11/16/2022 1232 by Zeenat Muniz RN  Outcome: Progressing  11/16/2022 1232 by Zeenat Muniz RN  Outcome: Progressing  Goal: Maintains/Returns to pre admission functional level  Description: INTERVENTIONS:  - Perform BMAT or MOVE assessment daily    - Set and communicate daily mobility goal to care team and patient/family/caregiver  - Collaborate with rehabilitation services on mobility goals if consulted  - Perform Range of Motion prn   - Reposition patient every prn   - Dangle patient prn   - Stand patient prn   - Ambulate patient prn   - Out of bed to chair prn   - Out of bed for meals prn   - Out of bed for toileting  - Record patient progress and toleration of activity level   11/16/2022 1232 by Zeenat Muniz RN  Outcome: Progressing  11/16/2022 1232 by Zeenat Muniz RN  Outcome: Progressing     Problem: Knowledge Deficit  Goal: Patient/family/caregiver demonstrates understanding of disease process, treatment plan, medications, and discharge instructions  Description: Complete learning assessment and assess knowledge base    Interventions:  - Provide teaching at level of understanding  - Provide teaching via preferred learning methods  11/16/2022 1232 by Zeenat Muniz RN  Outcome: Progressing  11/16/2022 1232 by Zeenat Muniz RN  Outcome: Progressing     Problem: DISCHARGE PLANNING  Goal: Discharge to home or other facility with appropriate resources  Description: INTERVENTIONS:  - Identify barriers to discharge w/patient and caregiver  - Arrange for needed discharge resources and transportation as appropriate  - Identify discharge learning needs (meds, wound care, etc )  - Arrange for interpretive services to assist at discharge as needed  - Refer to Case Management Department for coordinating discharge planning if the patient needs post-hospital services based on physician/advanced practitioner order or complex needs related to functional status, cognitive ability, or social support system  2022 by Martínez Max RN  Outcome: Progressing  2022 by Martínez Max RN  Outcome: Progressing     Problem: BIRTH - VAGINAL/ SECTION  Goal: Fetal and maternal status remain reassuring during the birth process  Description: INTERVENTIONS:  - Monitor vital signs  - Monitor fetal heart rate  - Monitor uterine activity  - Monitor labor progression (vaginal delivery)  - DVT prophylaxis  - Antibiotic prophylaxis  2022 123 by Martínez Max RN  Outcome: Progressing  2022 by Martínez Max RN  Outcome: Progressing  Goal: Emotionally satisfying birthing experience for mother/fetus  Description: Interventions:  - Assess, plan, implement and evaluate the nursing care given to the patient in labor  - Advocate the philosophy that each childbirth experience is a unique experience and support the family's chosen level of involvement and control during the labor process   - Actively participate in both the patient's and family's teaching of the birth process  - Consider cultural, Jewish and age-specific factors and plan care for the patient in labor  2022 by Martínez Max RN  Outcome: Progressing  2022 by Martínez Max RN  Outcome: Progressing

## 2022-11-16 NOTE — ANESTHESIA PROCEDURE NOTES
Spinal Block    Patient location during procedure: OR  Reason for block: procedure for pain and at surgeon's request  Staffing  Performed: CRNA   Resident/CRNA: Blayne Gallegos CRNA  Preanesthetic Checklist  Completed: patient identified, IV checked, site marked, risks and benefits discussed, surgical consent, monitors and equipment checked, pre-op evaluation and timeout performed  Spinal Block  Patient position: sitting  Prep: ChloraPrep  Patient monitoring: cardiac monitor and frequent blood pressure checks  Approach: midline  Location: L3-4  Injection technique: single-shot  Needle  Needle type: pencil-tip   Needle gauge: 25 G  Needle length: 10 cm  Assessment  Sensory level: T4  Injection Assessment:  negative aspiration for heme, no paresthesia on injection and positive aspiration for clear CSF    Post-procedure:  adhesive bandage applied, pressure dressing applied, secured with tape, site cleaned and sterile dressing applied

## 2022-11-16 NOTE — ASSESSMENT & PLAN NOTE
Lab Results   Component Value Date    HGBA1C 6 0 (H) 10/16/2022       Recent Labs     11/16/22  1236   POCGLU 67       Blood Sugar Average: Last 72 hrs:     POCT on admission  2h gluocla test postpartum

## 2022-11-16 NOTE — ANESTHESIA PREPROCEDURE EVALUATION
Procedure:   SECTION () (Uterus)    Relevant Problems   ANESTHESIA   (+) PONV (postoperative nausea and vomiting)      CARDIO   (+) Chronic hypertension      ENDO   (-) Hyperthyroidism   (-) Hypothyroidism      GI/HEPATIC   (+) GERD (gastroesophageal reflux disease)   (+) Hepatic steatosis      /RENAL (within normal limits)      GYN   (+) 38 weeks gestation of pregnancy   (+) Multigravida of advanced maternal age in third trimester      HEMATOLOGY   (+) Maternal anemia, antepartum      MUSCULOSKELETAL (within normal limits)      NEURO/PSYCH   (+) Anxiety      PULMONARY   (+) Occasional asthma with acute exacerbation   (-) Sleep apnea      Endocrine   (+) Insulin controlled gestational diabetes mellitus (GDM) in third trimester        Physical Exam    Airway    Mallampati score: III  TM Distance: <3 FB  Neck ROM: full     Dental   No notable dental hx     Cardiovascular      Pulmonary      Other Findings        Anesthesia Plan  ASA Score- 3     Anesthesia Type- spinal with ASA Monitors  Additional Monitors:   Airway Plan:           Plan Factors-Exercise tolerance (METS): >4 METS  Chart reviewed  EKG reviewed  Existing labs reviewed  Patient summary reviewed  Patient is not a current smoker  Induction-     Postoperative Plan- Plan for postoperative opioid use  Informed Consent- Anesthetic plan and risks discussed with patient and spouse  I personally reviewed this patient with the CRNA  Discussed and agreed on the Anesthesia Plan with the CRNA  Tabatha Gomez

## 2022-11-17 PROBLEM — O36.63X0 FETAL MACROSOMIA IN THIRD TRIMESTER: Status: RESOLVED | Noted: 2022-11-02 | Resolved: 2022-11-17

## 2022-11-17 PROBLEM — Z98.891 S/P CESAREAN SECTION: Status: ACTIVE | Noted: 2022-11-17

## 2022-11-17 LAB
ERYTHROCYTE [DISTWIDTH] IN BLOOD BY AUTOMATED COUNT: 15.3 % (ref 11.6–15.1)
HCT VFR BLD AUTO: 22.9 % (ref 34.8–46.1)
HGB BLD-MCNC: 7.4 G/DL (ref 11.5–15.4)
MCH RBC QN AUTO: 26 PG (ref 26.8–34.3)
MCHC RBC AUTO-ENTMCNC: 32.3 G/DL (ref 31.4–37.4)
MCV RBC AUTO: 80 FL (ref 82–98)
PLATELET # BLD AUTO: 216 THOUSANDS/UL (ref 149–390)
PMV BLD AUTO: 10.5 FL (ref 8.9–12.7)
RBC # BLD AUTO: 2.85 MILLION/UL (ref 3.81–5.12)
RPR SER QL: NORMAL
WBC # BLD AUTO: 8.79 THOUSAND/UL (ref 4.31–10.16)

## 2022-11-17 RX ADMIN — FLUOXETINE 10 MG: 10 CAPSULE ORAL at 10:00

## 2022-11-17 RX ADMIN — DOCUSATE SODIUM 100 MG: 100 CAPSULE, LIQUID FILLED ORAL at 11:35

## 2022-11-17 RX ADMIN — ACETAMINOPHEN 650 MG: 325 TABLET ORAL at 00:06

## 2022-11-17 RX ADMIN — SODIUM CHLORIDE 200 MG: 900 INJECTION, SOLUTION INTRAVENOUS at 11:34

## 2022-11-17 RX ADMIN — KETOROLAC TROMETHAMINE 30 MG: 30 INJECTION, SOLUTION INTRAMUSCULAR at 04:14

## 2022-11-17 RX ADMIN — ACETAMINOPHEN 650 MG: 325 TABLET ORAL at 05:53

## 2022-11-17 RX ADMIN — KETOROLAC TROMETHAMINE 30 MG: 30 INJECTION, SOLUTION INTRAMUSCULAR at 11:32

## 2022-11-17 RX ADMIN — ACETAMINOPHEN 650 MG: 325 TABLET ORAL at 11:41

## 2022-11-17 RX ADMIN — ENOXAPARIN SODIUM 40 MG: 40 INJECTION SUBCUTANEOUS at 21:25

## 2022-11-17 RX ADMIN — IBUPROFEN 600 MG: 600 TABLET, FILM COATED ORAL at 17:12

## 2022-11-17 RX ADMIN — DOCUSATE SODIUM 100 MG: 100 CAPSULE, LIQUID FILLED ORAL at 17:12

## 2022-11-17 RX ADMIN — ACETAMINOPHEN 650 MG: 325 TABLET ORAL at 18:44

## 2022-11-17 RX ADMIN — ENOXAPARIN SODIUM 40 MG: 40 INJECTION SUBCUTANEOUS at 10:00

## 2022-11-17 NOTE — PLAN OF CARE
Problem: Potential for Falls  Goal: Patient will remain free of falls  Description: INTERVENTIONS:  - Educate patient/family on patient safety including physical limitations  - Instruct patient to call for assistance with activity   - Consult OT/PT to assist with strengthening/mobility   - Keep Call bell within reach  - Keep bed low and locked with side rails adjusted as appropriate  - Keep care items and personal belongings within reach  - Initiate and maintain comfort rounds    - Apply yellow socks and bracelet for high fall risk patients  - Consider moving patient to room near nurses station  Outcome: Progressing     Problem: PAIN - ADULT  Goal: Verbalizes/displays adequate comfort level or baseline comfort level  Description: Interventions:  - Encourage patient to monitor pain and request assistance  - Assess pain using appropriate pain scale  - Administer analgesics based on type and severity of pain and evaluate response  - Implement non-pharmacological measures as appropriate and evaluate response  - Consider cultural and social influences on pain and pain management  - Notify physician/advanced practitioner if interventions unsuccessful or patient reports new pain  Outcome: Progressing     Problem: INFECTION - ADULT  Goal: Absence or prevention of progression during hospitalization  Description: INTERVENTIONS:  - Assess and monitor for signs and symptoms of infection  - Monitor lab/diagnostic results  - Monitor all insertion sites, i e  indwelling lines, tubes, and drains  - Monitor endotracheal if appropriate and nasal secretions for changes in amount and color  - [place] appropriate cooling/warming therapies per order  - Administer medications as ordered  - Instruct and encourage patient and family to use good hand hygiene technique  - Identify and instruct in appropriate isolation precautions for identified infection/condition  Outcome: Progressing  Goal: Absence of fever/infection during neutropenic period  Description: INTERVENTIONS:  - Monitor WBC    Outcome: Progressing     Problem: Knowledge Deficit  Goal: Patient/family/caregiver demonstrates understanding of disease process, treatment plan, medications, and discharge instructions  Description: Complete learning assessment and assess knowledge base    Interventions:  - Provide teaching at level of understanding  - Provide teaching via preferred learning methods  Outcome: Progressing     Problem: DISCHARGE PLANNING  Goal: Discharge to home or other facility with appropriate resources  Description: INTERVENTIONS:  - Identify barriers to discharge w/patient and caregiver  - Arrange for needed discharge resources and transportation as appropriate  - Identify discharge learning needs (meds, wound care, etc )  - Arrange for interpretive services to assist at discharge as needed  - Refer to Case Management Department for coordinating discharge planning if the patient needs post-hospital services based on physician/advanced practitioner order or complex needs related to functional status, cognitive ability, or social support system  Outcome: Progressing

## 2022-11-17 NOTE — LACTATION NOTE
This note was copied from a baby's chart  CONSULT - LACTATION  Baby Boy Jessica Rios) [place] 0 days male MRN: [id number]    [facility] NURSERY Room / Bed: (N)/(N) Encounter: [id number]    Maternal Information     MOTHER:  Felicia العلي  Maternal Age: 35 y o    OB History: # 1 - Date: None, Sex: None, Weight: None, GA: None, Delivery: None, Apgar1: None, Apgar5: None, Living: None, Birth Comments: None    # 2 - Date: None, Sex: None, Weight: None, GA: None, Delivery: None, Apgar1: None, Apgar5: None, Living: None, Birth Comments: None    # 3 - Date: 09/26/15, Sex: Female, Weight: 3629 g (8 lb), GA: 39w0d, Delivery: Vaginal, Spontaneous, Apgar1: None, Apgar5: None, Living: Living, Birth Comments: None    # 4 - Date: 22, Sex: Male, Weight: 4054 g (8 lb 15 oz), GA: 38w0d, Delivery: , Low Transverse, Apgar1: 8, Apgar5: 9, Living: Living, Birth Comments: None   Previouse breast reduction surgery? Yes    Lactation history:   Has patient previously breast fed: Yes   How long had patient previously breast fed: 3 mo  Previous breast feeding complications: Low milk supply     Past Surgical History:   Procedure Laterality Date   • ARTHROSCOPY KNEE     • BREAST SURGERY      reduction procedure   • REDUCTION MAMMAPLASTY Bilateral         Birth information:  YOB: 2024   Time of birth: 1:44 PM   Sex: male   Delivery type: , Low Transverse   Birth Weight: 4054 g (8 lb 15 oz)   Percent of Weight Change: 0%     Gestational Age: 38w0d   [unfilled]    Assessment     Breast and nipple assessment: round, symmetrical breasts with darker areolas and everted nipples     Assessment: cleepy in the bassinet    Feeding assessment: mom states baby isn't feeding well  enc  s2s  LATCH:  Latch:      Audible Swallowing:     Type of Nipple:     Comfort (Breast/Nipple):     Hold (Positioning):     LATCH Score:            Feeding recommendations: pump every 2-3 hours and supplement with expressed colostrum via syringe  Education on spitty due to birth  Enc  S2s  Demonstrated hand expression and teach back of manual pumping  Enc  Mom to pump every 2 hrs after attempts at the breast  Education on switch feeding  Mom wants to finish dinner before hospital grade pump education  Night RN will provide  RSB/DC reviewed    Enc  To call lactation     Pumping:   - When pumping, begin in stimulation mode (high cycle, low vacuum) until milk begins to express  Change pump to expression mode (low cycle, high vacuum)  Use hands on pumping techniques to assist with milk transfer  When milk stops expressing, change back to stimulation mode  When milk begins to flow, change to expression mode  You make cycle pump up to three times in a pumping session  Instructions given on pumping  Discussed when to start, frequency, different pumps available versus manual expression  Discussed hygiene of hands and supplies as well as assembly, placement of flanges, size of flanged, preparing the breast and cycles and suction settings on pump  Demonstrated use of hand pump  Discussed labeling of milk, storage, and preparation of stored milk  Information on hand expression given  Discussed benefits of knowing how to manually express breast including stimulating milk supply, softening nipple for latch and evacuating breast in the event of engorgement  Information on hand expression given  Discussed benefits of knowing how to manually express breast including stimulating milk supply, softening nipple for latch and evacuating breast in the event of engorgement  Mom is encouraged to place baby skin to skin for feedings  Skin to skin education provided for baby placement on mother's chest, baby only in diaper, blankets below shoulders on baby's back  Skin to skin is encouraged to continue at home for feedings and between feedings      Worked on positioning infant up at chest level and starting to feed infant with nose arriving at the nipple  Then, using areolar compression to achieve a deep latch that is comfortable and exchanges optimum amounts of milk  - Start feedings on breast that last feeding ended   - allow no more than 3 hours between breast feeding sessions   - time between feedings is counted from the beginning of the first feed to the beginning of the next feeding session    Reviewed early signs of hunger, including tensing of hands and shoulders - no need to wait for open eyes  Crying is a late hunger sign  If baby is crying, soothe baby first and then attempt to latch  Reviewed normal sucking patterns: transition from stimulation to nutritive to release or non-nutritive  The goal is to see and hear lots of swallowing  Reviewed normal nursing pattern: infant could latch on one breast up to 30 minutes or until releases on own  Signs of satiation is open hand with fingers that do not grab your finger  Discussed difference in sensation of non-nutritive v nutritive sucking    Met with mother  Provided mother with Ready, Set, Baby booklet  Discussed Skin to Skin contact an benefits to mom and baby  Talked about the delay of the first bath until baby has adjusted  Spoke about the benefits of rooming in  Feeding on cue and what that means for recognizing infant's hunger  Avoidance of pacifiers for the first month discussed  Talked about exclusive breastfeeding for the first 6 months  Positioning and latch reviewed as well as showing images of other feeding positions  Discussed the properties of a good latch in any position  Reviewed hand/manual expression  Discussed s/s that baby is getting enough milk and some s/s that breastfeeding dyad may need further help  Gave information on common concerns, what to expect the first few weeks after delivery, preparing for other caregivers, and how partners can help   Resources for support also provided  Encouraged parents to call for assistance, questions, and concerns about breastfeeding  Extension provided  Met with mother to go over discharge breastfeeding booklet including the feeding log  Emphasized 8 or more (12) feedings in a 24 hour period, what to expect for the number of diapers per day of life and the progression of properties of the  stooling pattern  Reviewed breastfeeding and your lifestyle, storage and preparation of breast milk, how to keep you breast pump clean, the employed breastfeeding mother and paced bottle feeding handouts  Booklet included Breastfeeding Resources for after discharge including access to the number for the [address] [place], [place] 2022 7:08 PM

## 2022-11-17 NOTE — ASSESSMENT & PLAN NOTE
QBL 1143, Hgb 9 8 --> post op Hgb 9 1 -> POD#1 Hgb 7 4, Venofer ordered  Lines: reynoso in place , low output overnight per PRN, urine aide in color  Pain: Tylenol and toradol scheduled, demi 5/10 PRN    FEN: Tolerating regular diet  DVT ppx: SCDs and  Lovenox 40mg qD  Passing flatus   Incision C/D/I   Rh positive  GBS negative

## 2022-11-17 NOTE — PLAN OF CARE
Problem: Potential for Falls  Goal: Patient will remain free of falls  Description: INTERVENTIONS:  - Educate patient/family on patient safety including physical limitations  - Instruct patient to call for assistance with activity   - Consult OT/PT to assist with strengthening/mobility   - Keep Call bell within reach  - Keep bed low and locked with side rails adjusted as appropriate  - Keep care items and personal belongings within reach  - Initiate and maintain comfort rounds  - Make Fall Risk Sign visible to staff  - Offer Toileting every  Hours, in advance of need  - Initiate/Maintain alarm  - Obtain necessary fall risk management equipment:   - Apply yellow socks and bracelet for high fall risk patients  - Consider moving patient to room near nurses station  Outcome: Progressing     Problem: PAIN - ADULT  Goal: Verbalizes/displays adequate comfort level or baseline comfort level  Description: Interventions:  - Encourage patient to monitor pain and request assistance  - Assess pain using appropriate pain scale  - Administer analgesics based on type and severity of pain and evaluate response  - Implement non-pharmacological measures as appropriate and evaluate response  - Consider cultural and social influences on pain and pain management  - Notify physician/advanced practitioner if interventions unsuccessful or patient reports new pain  Outcome: Progressing     Problem: INFECTION - ADULT  Goal: Absence or prevention of progression during hospitalization  Description: INTERVENTIONS:  - Assess and monitor for signs and symptoms of infection  - Monitor lab/diagnostic results  - Monitor all insertion sites, i e  indwelling lines, tubes, and drains  - Monitor endotracheal if appropriate and nasal secretions for changes in amount and color  - [place] appropriate cooling/warming therapies per order  - Administer medications as ordered  - Instruct and encourage patient and family to use good hand hygiene technique  - Identify and instruct in appropriate isolation precautions for identified infection/condition  Outcome: Progressing  Goal: Absence of fever/infection during neutropenic period  Description: INTERVENTIONS:  - Monitor WBC    Outcome: Progressing     Problem: SAFETY ADULT  Goal: Patient will remain free of falls  Description: INTERVENTIONS:  - Educate patient/family on patient safety including physical limitations  - Instruct patient to call for assistance with activity   - Consult OT/PT to assist with strengthening/mobility   - Keep Call bell within reach  - Keep bed low and locked with side rails adjusted as appropriate  - Keep care items and personal belongings within reach  - Initiate and maintain comfort rounds  - Make Fall Risk Sign visible to staff  - Offer Toileting every  Hours, in advance of need  - Initiate/Maintain alarm  - Obtain necessary fall risk management equipment:   - Apply yellow socks and bracelet for high fall risk patients  - Consider moving patient to room near nurses station  Outcome: Progressing  Goal: Maintain or return to baseline ADL function  Description: INTERVENTIONS:  -  Assess patient's ability to carry out ADLs; assess patient's baseline for ADL function and identify physical deficits which impact ability to perform ADLs (bathing, care of mouth/teeth, toileting, grooming, dressing, etc )  - Assess/evaluate cause of self-care deficits   - Assess range of motion  - Assess patient's mobility; develop plan if impaired  - Assess patient's need for assistive devices and provide as appropriate  - Encourage maximum independence but intervene and supervise when necessary  - Involve family in performance of ADLs  - Assess for home care needs following discharge   - Consider OT consult to assist with ADL evaluation and planning for discharge  - Provide patient education as appropriate  Outcome: Progressing  Goal: Maintains/Returns to pre admission functional level  Description: INTERVENTIONS:  - Perform BMAT or MOVE assessment daily    - Set and communicate daily mobility goal to care team and patient/family/caregiver  - Collaborate with rehabilitation services on mobility goals if consulted  - Perform Range of Motion  times a day  - Reposition patient every  hours  - Dangle patient  times a day  - Stand patient  times a day  - Ambulate patient  times a day  - Out of bed to chair  times a day   - Out of bed for meals  times a day  - Out of bed for toileting  - Record patient progress and toleration of activity level   Outcome: Progressing     Problem: Knowledge Deficit  Goal: Patient/family/caregiver demonstrates understanding of disease process, treatment plan, medications, and discharge instructions  Description: Complete learning assessment and assess knowledge base    Interventions:  - Provide teaching at level of understanding  - Provide teaching via preferred learning methods  Outcome: Progressing     Problem: DISCHARGE PLANNING  Goal: Discharge to home or other facility with appropriate resources  Description: INTERVENTIONS:  - Identify barriers to discharge w/patient and caregiver  - Arrange for needed discharge resources and transportation as appropriate  - Identify discharge learning needs (meds, wound care, etc )  - Arrange for interpretive services to assist at discharge as needed  - Refer to Case Management Department for coordinating discharge planning if the patient needs post-hospital services based on physician/advanced practitioner order or complex needs related to functional status, cognitive ability, or social support system  Outcome: Progressing     Problem: POSTPARTUM  Goal: Experiences normal postpartum course  Description: INTERVENTIONS:  - Monitor maternal vital signs  - Assess uterine involution and lochia  Outcome: Progressing  Goal: Appropriate maternal -  bonding  Description: INTERVENTIONS:  - Identify family support  - Assess for appropriate maternal/infant bonding   -Encourage maternal/infant bonding opportunities  - Referral to  or  as needed  Outcome: Progressing  Goal: Establishment of infant feeding pattern  Description: INTERVENTIONS:  - Assess breast/bottle feeding  - Refer to lactation as needed  Outcome: Progressing  Goal: Incision(s), wounds(s) or drain site(s) healing without S/S of infection  Description: INTERVENTIONS  - Assess and document dressing, incision, wound bed, drain sites and surrounding tissue  - Provide patient and family education  - Perform skin care/dressing changes every   Outcome: Progressing

## 2022-11-17 NOTE — PROGRESS NOTES
Progress Note - OB/GYN  Nichole Lizarraga 35 y o  female MRN: [id number]  Unit/Bed#: -01 Encounter: [id number]    Assessment and Plan     June Hernandez is a patient of: [facility]  She is PPD# 1 s/p  primary  section, low transverse incision  Recovering well and is stable     S/P  section  Assessment & Plan  QBL 1143, Hgb 9 8 --> post op Hgb 9 1 -> POD#1 Hgb 7 4, Venofer ordered  Lines: reynoso in place , low output overnight per PRN, urine aide in color  Pain: Tylenol and toradol scheduled, demi 5/10 PRN    FEN: Tolerating regular diet  DVT ppx: SCDs and  Lovenox 40mg qD  Passing flatus   Incision C/D/I   Rh positive  GBS negative          Fetal macrosomia in third trimester  Assessment & Plan  - EFW: 3952 grams - 8 lbs 11 oz    (>97%) on 22  -  section    Chronic hypertension  Assessment & Plan  No medications  CBC, CMP, P/Cr pending  Continue monitoring BPs    Insulin controlled gestational diabetes mellitus (GDM) in third trimester  Assessment & Plan  Lab Results   Component Value Date    HGBA1C 6 0 (H) 10/16/2022       Recent Labs     22  1236   POCGLU 67       Blood Sugar Average: Last 72 hrs:     POCT on admission  2h gluocla test postpartum      Disposition    - Anticipate discharge home on PPD# 2-3      Subjective/Objective     Chief Complaint: Postpartum State     Subjective:    June Hernandez is PPD/POD#1 s/p  primary  section, low transverse incision, bilateral salpingectomy, and myomectomy  She has no current complaints  Pain is well controlled  Patient is currently voiding  She is ambulating  Patient is currently passing flatus and has had no bowel movement  She is tolerating PO, and denies nausea or vomitting  Patient denies fever, chills, chest pain, shortness of breath, or calf tenderness  Lochia is normal  She is  Breastfeeding  She is recovering well and is stable         Vitals:   BP 99/53 (BP Location: Right arm)   Pulse 82 Temp 98 1 °F (36 7 °C) (Oral)   Resp 18   Ht 5' 2" (1 575 m)   Wt 103 kg (226 lb)   LMP 02/23/2022   SpO2 97%   Breastfeeding Yes   BMI 41 34 kg/m²       Intake/Output Summary (Last 24 hours) at 11/17/2022 0617  Last data filed at 11/17/2022 0545  Gross per 24 hour   Intake 3562 5 ml   Output 1843 ml   Net 1719 5 ml       Invasive Devices     Peripheral Intravenous Line  Duration           Peripheral IV 11/16/22 Dorsal (posterior); Left Hand <1 day          Drain  Duration           Urethral Catheter Double-lumen;Non-latex 16 Fr  <1 day                Physical Exam:   GEN: Lilliam Feliciano appears well, alert and oriented x 3, pleasant and cooperative   CARDIO: RRR, no murmurs or rubs  RESP:  CTAB, no wheezes or rales  ABDOMEN: soft, no tenderness, no distention, fundus @ umbilicus, Incision C/D/I  EXTREMITIES: SCDs on, non tender, no erythema      Labs:     Hemoglobin   Date Value Ref Range Status   11/16/2022 9 1 (L) 11 5 - 15 4 g/dL Final   11/16/2022 9 8 (L) 11 5 - 15 4 g/dL Final   09/27/2015 8 9 (L) 11 5 - 15 4 g/dL Final   09/25/2015 12 3 11 5 - 15 4 g/dL Final     WBC   Date Value Ref Range Status   11/16/2022 14 72 (H) 4 31 - 10 16 Thousand/uL Final   11/16/2022 7 52 4 31 - 10 16 Thousand/uL Final   09/27/2015 13 52 (H) 4 31 - 10 16 Thousand/uL Final   09/25/2015 8 28 4 31 - 10 16 Thousand/uL Final     Platelets   Date Value Ref Range Status   11/16/2022 273 149 - 390 Thousands/uL Final   11/16/2022 299 149 - 390 Thousands/uL Final   09/27/2015 221 149 - 390 Thousand/uL Final   09/25/2015 267 149 - 390 Thousand/uL Final     Creatinine   Date Value Ref Range Status   11/16/2022 0 48 (L) 0 60 - 1 30 mg/dL Final     Comment:     Standardized to IDMS reference method   11/04/2022 0 51 (L) 0 60 - 1 30 mg/dL Final     Comment:     Standardized to IDMS reference method     AST   Date Value Ref Range Status   11/16/2022 19 13 - 39 U/L Final     Comment:     Specimen collection should occur prior to Sulfasalazine administration due to the potential for falsely depressed results  11/04/2022 16 13 - 39 U/L Final     Comment:     Specimen collection should occur prior to Sulfasalazine administration due to the potential for falsely depressed results  03/17/2018 14 10 - 30 U/L Final     ALT   Date Value Ref Range Status   11/16/2022 16 7 - 52 U/L Final     Comment:     Specimen collection should occur prior to Sulfasalazine administration due to the potential for falsely depressed results  11/04/2022 16 7 - 52 U/L Final     Comment:     Specimen collection should occur prior to Sulfasalazine administration due to the potential for falsely depressed results      03/17/2018 17 6 - 29 U/L Final          Ian Topete DO  11/17/2022  6:17 AM

## 2022-11-17 NOTE — CASE MANAGEMENT
Case Management Discharge Planning Note    Patient name Keyanna Beauchamp  Location  323/-48 MRN [id number]  : 1980 Date 2022       Current Admission Date: 2022  Current Admission Diagnosis:38 weeks gestation of pregnancy   Patient Active Problem List    Diagnosis Date Noted   • S/P  section 2022   • PONV (postoperative nausea and vomiting) 2022   • Chronic hypertension 10/11/2022   • Maternal anemia, antepartum 2022   • Obesity in pregnancy, antepartum 2022   • Uterine fibroid in pregnancy 2022   • Insulin controlled gestational diabetes mellitus (GDM) in third trimester 2022   • Multigravida of advanced maternal age in third trimester 2022   • 38 weeks gestation of pregnancy 2022   • Anxiety 2021   • GERD (gastroesophageal reflux disease) 2020   • Family history of colon cancer 2020   • Hepatic steatosis 2020   • Fibroids 2015   • Occasional asthma with acute exacerbation 2015   • Allergic rhinitis 2012      LOS (days): 1  Geometric Mean LOS (GMLOS) (days):   Days to GMLOS:     OBJECTIVE:  Risk of Unplanned Readmission Score: 6 38         Current admission status: Inpatient   Preferred Pharmacy:   [facility]/pharmacy [address] [place] [address]  Phone: [phone] Fax: [phone] [address] [address]  Phone: [phone] Fax: [phone]    Primary Care Provider: Marilin Mejias MD    Primary Insurance: BLUE CROSS  Secondary Insurance:     DISCHARGE DETAILS:  MOB UDS negative  Baby Boy UDS negative  MOB cleared to discharge home with Baby Boy  No other CM needs noted

## 2022-11-18 VITALS
WEIGHT: 226 LBS | DIASTOLIC BLOOD PRESSURE: 81 MMHG | HEART RATE: 92 BPM | SYSTOLIC BLOOD PRESSURE: 148 MMHG | OXYGEN SATURATION: 98 % | HEIGHT: 62 IN | TEMPERATURE: 97.7 F | RESPIRATION RATE: 18 BRPM | BODY MASS INDEX: 41.59 KG/M2

## 2022-11-18 RX ORDER — ONDANSETRON 2 MG/ML
4 INJECTION INTRAMUSCULAR; INTRAVENOUS EVERY 8 HOURS PRN
Status: DISCONTINUED | OUTPATIENT
Start: 2022-11-18 | End: 2022-11-18 | Stop reason: HOSPADM

## 2022-11-18 RX ORDER — ACETAMINOPHEN 325 MG/1
650 TABLET ORAL EVERY 6 HOURS SCHEDULED
Qty: 12 TABLET | Refills: 0
Start: 2022-11-18 | End: 2022-11-20

## 2022-11-18 RX ORDER — IBUPROFEN 600 MG/1
600 TABLET ORAL EVERY 6 HOURS PRN
Qty: 30 TABLET | Refills: 0
Start: 2022-11-18 | End: 2022-11-23 | Stop reason: ALTCHOICE

## 2022-11-18 RX ORDER — OXYCODONE HYDROCHLORIDE 5 MG/1
5 TABLET ORAL EVERY 4 HOURS PRN
Qty: 5 TABLET | Refills: 0 | Status: SHIPPED | OUTPATIENT
Start: 2022-11-18 | End: 2022-11-28

## 2022-11-18 RX ADMIN — ACETAMINOPHEN 650 MG: 325 TABLET ORAL at 08:34

## 2022-11-18 RX ADMIN — ACETAMINOPHEN 650 MG: 325 TABLET ORAL at 01:16

## 2022-11-18 RX ADMIN — FLUOXETINE 10 MG: 10 CAPSULE ORAL at 08:32

## 2022-11-18 RX ADMIN — DOCUSATE SODIUM 100 MG: 100 CAPSULE, LIQUID FILLED ORAL at 08:31

## 2022-11-18 RX ADMIN — IBUPROFEN 600 MG: 600 TABLET, FILM COATED ORAL at 08:31

## 2022-11-18 RX ADMIN — ENOXAPARIN SODIUM 40 MG: 40 INJECTION SUBCUTANEOUS at 08:31

## 2022-11-18 NOTE — PLAN OF CARE
Problem: Potential for Falls  Goal: Patient will remain free of falls  Description: INTERVENTIONS:  - Educate patient/family on patient safety including physical limitations  - Instruct patient to call for assistance with activity   - Consult OT/PT to assist with strengthening/mobility   - Keep Call bell within reach  - Keep bed low and locked with side rails adjusted as appropriate  - Keep care items and personal belongings within reach  - Initiate and maintain comfort rounds  - Make Fall Risk Sign visible to staff  - Offer Toileting every Hours, in advance of need  - Initiate/Maintain alarm  - Obtain necessary fall risk management equipment:   - Apply yellow socks and bracelet for high fall risk patients  - Consider moving patient to room near nurses station  Outcome: Progressing     Problem: PAIN - ADULT  Goal: Verbalizes/displays adequate comfort level or baseline comfort level  Description: Interventions:  - Encourage patient to monitor pain and request assistance  - Assess pain using appropriate pain scale  - Administer analgesics based on type and severity of pain and evaluate response  - Implement non-pharmacological measures as appropriate and evaluate response  - Consider cultural and social influences on pain and pain management  - Notify physician/advanced practitioner if interventions unsuccessful or patient reports new pain  Outcome: Progressing     Problem: INFECTION - ADULT  Goal: Absence or prevention of progression during hospitalization  Description: INTERVENTIONS:  - Assess and monitor for signs and symptoms of infection  - Monitor lab/diagnostic results  - Monitor all insertion sites, i e  indwelling lines, tubes, and drains  - Monitor endotracheal if appropriate and nasal secretions for changes in amount and color  - [place] appropriate cooling/warming therapies per order  - Administer medications as ordered  - Instruct and encourage patient and family to use good hand hygiene technique  - Identify and instruct in appropriate isolation precautions for identified infection/condition  Outcome: Progressing  Goal: Absence of fever/infection during neutropenic period  Description: INTERVENTIONS:  - Monitor WBC    Outcome: Progressing     Problem: SAFETY ADULT  Goal: Patient will remain free of falls  Description: INTERVENTIONS:  - Educate patient/family on patient safety including physical limitations  - Instruct patient to call for assistance with activity   - Consult OT/PT to assist with strengthening/mobility   - Keep Call bell within reach  - Keep bed low and locked with side rails adjusted as appropriate  - Keep care items and personal belongings within reach  - Initiate and maintain comfort rounds  - Make Fall Risk Sign visible to staff  - Offer Toileting every Hours, in advance of need  - Initiate/Maintain alarm  - Obtain necessary fall risk management equipment:   - Apply yellow socks and bracelet for high fall risk patients  - Consider moving patient to room near nurses station  Outcome: Progressing  Goal: Maintain or return to baseline ADL function  Description: INTERVENTIONS:  -  Assess patient's ability to carry out ADLs; assess patient's baseline for ADL function and identify physical deficits which impact ability to perform ADLs (bathing, care of mouth/teeth, toileting, grooming, dressing, etc )  - Assess/evaluate cause of self-care deficits   - Assess range of motion  - Assess patient's mobility; develop plan if impaired  - Assess patient's need for assistive devices and provide as appropriate  - Encourage maximum independence but intervene and supervise when necessary  - Involve family in performance of ADLs  - Assess for home care needs following discharge   - Consider OT consult to assist with ADL evaluation and planning for discharge  - Provide patient education as appropriate  Outcome: Progressing  Goal: Maintains/Returns to pre admission functional level  Description: INTERVENTIONS:  - Perform BMAT or MOVE assessment daily    - Set and communicate daily mobility goal to care team and patient/family/caregiver  - Collaborate with rehabilitation services on mobility goals if consulted  - Perform Range of Motion times a day  - Reposition patient every hours  - Dangle patient times a day  - Stand patient times a day  - Ambulate patient times a day  - Out of bed to chair times a day   - Out of bed for meals times a day  - Out of bed for toileting  - Record patient progress and toleration of activity level   Outcome: Progressing     Problem: Knowledge Deficit  Goal: Patient/family/caregiver demonstrates understanding of disease process, treatment plan, medications, and discharge instructions  Description: Complete learning assessment and assess knowledge base    Interventions:  - Provide teaching at level of understanding  - Provide teaching via preferred learning methods  Outcome: Progressing     Problem: DISCHARGE PLANNING  Goal: Discharge to home or other facility with appropriate resources  Description: INTERVENTIONS:  - Identify barriers to discharge w/patient and caregiver  - Arrange for needed discharge resources and transportation as appropriate  - Identify discharge learning needs (meds, wound care, etc )  - Arrange for interpretive services to assist at discharge as needed  - Refer to Case Management Department for coordinating discharge planning if the patient needs post-hospital services based on physician/advanced practitioner order or complex needs related to functional status, cognitive ability, or social support system  Outcome: Progressing     Problem: POSTPARTUM  Goal: Experiences normal postpartum course  Description: INTERVENTIONS:  - Monitor maternal vital signs  - Assess uterine involution and lochia  Outcome: Progressing  Goal: Appropriate maternal -  bonding  Description: INTERVENTIONS:  - Identify family support  - Assess for appropriate maternal/infant bonding -Encourage maternal/infant bonding opportunities  - Referral to  or  as needed  Outcome: Progressing  Goal: Establishment of infant feeding pattern  Description: INTERVENTIONS:  - Assess breast/bottle feeding  - Refer to lactation as needed  Outcome: Progressing  Goal: Incision(s), wounds(s) or drain site(s) healing without S/S of infection  Description: INTERVENTIONS  - Assess and document dressing, incision, wound bed, drain sites and surrounding tissue  - Provide patient and family education  - Perform skin care/dressing changes every   Outcome: Progressing

## 2022-11-18 NOTE — PLAN OF CARE
Problem: Potential for Falls  Goal: Patient will remain free of falls  Description: INTERVENTIONS:  - Educate patient/family on patient safety including physical limitations  - Instruct patient to call for assistance with activity   - Consult OT/PT to assist with strengthening/mobility   - Keep Call bell within reach  - Keep bed low and locked with side rails adjusted as appropriate  - Keep care items and personal belongings within reach  - Initiate and maintain comfort rounds  - Make Fall Risk Sign visible to staff  -  Outcome: Adequate for Discharge     Problem: PAIN - ADULT  Goal: Verbalizes/displays adequate comfort level or baseline comfort level  Description: Interventions:  - Encourage patient to monitor pain and request assistance  - Assess pain using appropriate pain scale  - Administer analgesics based on type and severity of pain and evaluate response  - Implement non-pharmacological measures as appropriate and evaluate response  - Consider cultural and social influences on pain and pain management  - Notify physician/advanced practitioner if interventions unsuccessful or patient reports new pain  Outcome: Adequate for Discharge     Problem: INFECTION - ADULT  Goal: Absence or prevention of progression during hospitalization  Description: INTERVENTIONS:  - Assess and monitor for signs and symptoms of infection  - Monitor lab/diagnostic results  - Monitor all insertion sites, i e  indwelling lines, tubes, and drains  - Monitor endotracheal if appropriate and nasal secretions for changes in amount and color  - [place] appropriate cooling/warming therapies per order  - Administer medications as ordered  - Instruct and encourage patient and family to use good hand hygiene technique  - Identify and instruct in appropriate isolation precautions for identified infection/condition  Outcome: Adequate for Discharge  Goal: Absence of fever/infection during neutropenic period  Description: INTERVENTIONS:  - Monitor WBC    Outcome: Adequate for Discharge     Problem: SAFETY ADULT  Goal: Patient will remain free of falls  Description: INTERVENTIONS:  - Educate patient/family on patient safety including physical limitations  - Instruct patient to call for assistance with activity   - Consult OT/PT to assist with strengthening/mobility   - Keep Call bell within reach  - Keep bed low and locked with side rails adjusted as appropriate  - Keep care items and personal belongings within reach  - Initiate and maintain comfort rounds  - Make Fall Risk Sign visible to staff    Outcome: Adequate for Discharge  Goal: Maintain or return to baseline ADL function  Description: INTERVENTIONS:  -  Assess patient's ability to carry out ADLs; assess patient's baseline for ADL function and identify physical deficits which impact ability to perform ADLs (bathing, care of mouth/teeth, toileting, grooming, dressing, etc )  - Assess/evaluate cause of self-care deficits   - Assess range of motion  - Assess patient's mobility; develop plan if impaired  - Assess patient's need for assistive devices and provide as appropriate  - Encourage maximum independence but intervene and supervise when necessary  - Involve family in performance of ADLs  - Assess for home care needs following discharge   - Consider OT consult to assist with ADL evaluation and planning for discharge  - Provide patient education as appropriate  Outcome: Adequate for Discharge  Goal: Maintains/Returns to pre admission functional level  Description: INTERVENTIONS:  - Perform BMAT or MOVE assessment daily    - Set and communicate daily mobility goal to care team and patient/family/caregiver     - Collaborate with rehabilitation services on mobility goals if consulted    - Out of bed for toileting  - Record patient progress and toleration of activity level   Outcome: Adequate for Discharge     Problem: Knowledge Deficit  Goal: Patient/family/caregiver demonstrates understanding of disease process, treatment plan, medications, and discharge instructions  Description: Complete learning assessment and assess knowledge base    Interventions:  - Provide teaching at level of understanding  - Provide teaching via preferred learning methods  Outcome: Adequate for Discharge     Problem: DISCHARGE PLANNING  Goal: Discharge to home or other facility with appropriate resources  Description: INTERVENTIONS:  - Identify barriers to discharge w/patient and caregiver  - Arrange for needed discharge resources and transportation as appropriate  - Identify discharge learning needs (meds, wound care, etc )  - Arrange for interpretive services to assist at discharge as needed  - Refer to Case Management Department for coordinating discharge planning if the patient needs post-hospital services based on physician/advanced practitioner order or complex needs related to functional status, cognitive ability, or social support system  Outcome: Adequate for Discharge     Problem: POSTPARTUM  Goal: Experiences normal postpartum course  Description: INTERVENTIONS:  - Monitor maternal vital signs  - Assess uterine involution and lochia  Outcome: Adequate for Discharge  Goal: Appropriate maternal -  bonding  Description: INTERVENTIONS:  - Identify family support  - Assess for appropriate maternal/infant bonding   -Encourage maternal/infant bonding opportunities  - Referral to  or  as needed  Outcome: Adequate for Discharge  Goal: Establishment of infant feeding pattern  Description: INTERVENTIONS:  - Assess breast/bottle feeding  - Refer to lactation as needed  Outcome: Adequate for Discharge  Goal: Incision(s), wounds(s) or drain site(s) healing without S/S of infection  Description: INTERVENTIONS  - Assess and document dressing, incision, wound bed, drain sites and surrounding tissue  - Provide patient and family education  - Perform skin care/dressing changes every day  Outcome: Adequate for Discharge

## 2022-11-18 NOTE — PROGRESS NOTES
Progress Note - OB/GYN  Vicente Naun Halper 35 y o  female MRN: [id number]  Unit/Bed#:  323-01 Encounter: [id number]    Assessment and Plan     Joe Richey is a patient of: [facility]  She is PPD# 2 s/p  primary  section, low transverse incision  Recovering well and is stable       S/P  section  Assessment & Plan  QBL 1143, Hgb 9 8 --> post op Hgb 9 1 -> POD#1 Hgb 7 4, Venofer ordered  Lines: reynoso in place , low output overnight per PRN, urine aide in color  Pain: Tylenol and toradol scheduled, demi 5/10 PRN    FEN: Tolerating regular diet  DVT ppx: SCDs and  Lovenox 40mg qD  Passing flatus   Incision C/D/I   Rh positive  GBS negative          Chronic hypertension  Assessment & Plan  No medications  CBC, CMP wnl, P/Cr 0 27  Continue monitoring BPs    Insulin controlled gestational diabetes mellitus (GDM) in third trimester  Assessment & Plan  Lab Results   Component Value Date    HGBA1C 6 0 (H) 10/16/2022       Recent Labs     22  1236   POCGLU 67       Blood Sugar Average: Last 72 hrs:     POCT on admission  2h gluocla test postpartum    Fetal macrosomia in third trimester-resolved as of 2022  Assessment & Plan  - EFW: 3952 grams - 8 lbs 11 oz    (>97%) on 22  -  section      Disposition    - Anticipate discharge home on PPD# 2      Subjective/Objective     Chief Complaint: Postpartum State     Subjective:    Joe Richey is PPD/POD#2 s/p  primary  section, low transverse incision  She has no current complaints  Pain is well controlled  Patient is currently voiding  She is ambulating  Patient is currently passing flatus and has had no bowel movement  She is tolerating PO, and denies nausea or vomitting  Patient denies fever, chills, chest pain, shortness of breath, or calf tenderness  Lochia is normal  She is  Breastfeeding  She is recovering well and is stable         Vitals:   /76 (BP Location: Right arm)   Pulse 89   Temp 98 4 °F (36 9 °C) (Oral)   Resp 18   Ht 5' 2" (1 575 m)   Wt 103 kg (226 lb)   LMP 02/23/2022   SpO2 97%   Breastfeeding Yes   BMI 41 34 kg/m²       Intake/Output Summary (Last 24 hours) at 11/18/2022 0607  Last data filed at 11/17/2022 1630  Gross per 24 hour   Intake --   Output 1525 ml   Net -1525 ml       Invasive Devices     Peripheral Intravenous Line  Duration           Peripheral IV 11/16/22 Dorsal (posterior); Left Hand 1 day                Physical Exam:   GEN: Kaelyn Castelan appears well, alert and oriented x 3, pleasant and cooperative   CARDIO: RRR, no murmurs or rubs  RESP:  CTAB, no wheezes or rales  ABDOMEN: soft, no tenderness, no distention, fundus @ U+2, Incision C/D/I  EXTREMITIES: SCDs on, non tender, no erythema    Labs:     Hemoglobin   Date Value Ref Range Status   11/17/2022 7 4 (L) 11 5 - 15 4 g/dL Final   11/16/2022 9 1 (L) 11 5 - 15 4 g/dL Final   09/27/2015 8 9 (L) 11 5 - 15 4 g/dL Final   09/25/2015 12 3 11 5 - 15 4 g/dL Final     WBC   Date Value Ref Range Status   11/17/2022 8 79 4 31 - 10 16 Thousand/uL Final   11/16/2022 14 72 (H) 4 31 - 10 16 Thousand/uL Final   09/27/2015 13 52 (H) 4 31 - 10 16 Thousand/uL Final   09/25/2015 8 28 4 31 - 10 16 Thousand/uL Final     Platelets   Date Value Ref Range Status   11/17/2022 216 149 - 390 Thousands/uL Final   11/16/2022 273 149 - 390 Thousands/uL Final   09/27/2015 221 149 - 390 Thousand/uL Final   09/25/2015 267 149 - 390 Thousand/uL Final     Creatinine   Date Value Ref Range Status   11/16/2022 0 48 (L) 0 60 - 1 30 mg/dL Final     Comment:     Standardized to IDMS reference method   11/04/2022 0 51 (L) 0 60 - 1 30 mg/dL Final     Comment:     Standardized to IDMS reference method     AST   Date Value Ref Range Status   11/16/2022 19 13 - 39 U/L Final     Comment:     Specimen collection should occur prior to Sulfasalazine administration due to the potential for falsely depressed results      11/04/2022 16 13 - 39 U/L Final Comment:     Specimen collection should occur prior to Sulfasalazine administration due to the potential for falsely depressed results  03/17/2018 14 10 - 30 U/L Final     ALT   Date Value Ref Range Status   11/16/2022 16 7 - 52 U/L Final     Comment:     Specimen collection should occur prior to Sulfasalazine administration due to the potential for falsely depressed results  11/04/2022 16 7 - 52 U/L Final     Comment:     Specimen collection should occur prior to Sulfasalazine administration due to the potential for falsely depressed results      03/17/2018 17 6 - 29 U/L Final          Ian Topete DO  11/18/2022  6:07 AM

## 2022-11-22 LAB — PLACENTA IN STORAGE: NORMAL

## 2022-11-23 ENCOUNTER — POSTPARTUM VISIT (OUTPATIENT)
Dept: OBGYN CLINIC | Facility: CLINIC | Age: 42
End: 2022-11-23

## 2022-11-23 ENCOUNTER — NURSE TRIAGE (OUTPATIENT)
Dept: OTHER | Facility: OTHER | Age: 42
End: 2022-11-23

## 2022-11-23 VITALS — WEIGHT: 215.8 LBS | DIASTOLIC BLOOD PRESSURE: 84 MMHG | SYSTOLIC BLOOD PRESSURE: 152 MMHG | BODY MASS INDEX: 39.47 KG/M2

## 2022-11-23 DIAGNOSIS — I10 CHRONIC HYPERTENSION: Primary | ICD-10-CM

## 2022-11-23 DIAGNOSIS — R03.0 ELEVATED BLOOD PRESSURE READING: ICD-10-CM

## 2022-11-23 RX ORDER — NIFEDIPINE 30 MG/1
30 TABLET, EXTENDED RELEASE ORAL DAILY
Qty: 30 TABLET | Refills: 0 | Status: SHIPPED | OUTPATIENT
Start: 2022-11-23

## 2022-11-23 RX ORDER — ACETAMINOPHEN 500 MG
500 TABLET ORAL EVERY 6 HOURS PRN
COMMUNITY

## 2022-11-23 NOTE — TELEPHONE ENCOUNTER
Reason for Disposition  • RN needs further essential information from caller in order to complete triage    Answer Assessment - Initial Assessment Questions  1  REASON FOR CALL or QUESTION: "What is your reason for calling today?" or "How can I best help you?" or "What question do you have that I can help answer?"      Patient stated she was just seen in the office for post partum visit and told to get lab work done tonight; all labs were closed and she cannot get it done today  Due to holiday, wanted to know if it would be ok to wait until Friday to get BW completed      Protocols used: INFORMATION ONLY CALL - NO TRIAGE-ADULT-

## 2022-11-23 NOTE — TELEPHONE ENCOUNTER
On call stated okay to wait to Friday  Check bp tonight and tomorrow and if >160/110 post meds call  Call with headache or other new symptoms     Patient verbalized understanding

## 2022-11-23 NOTE — PROGRESS NOTES
Mari Nash Elham  1980    S:  35 y o  female here for blood pressure check    She is s/p 1LTCS and BS and myomectomy on 11/16/22  She overall feels well  Noted a vein bulging on her labia  No headaches, visual changes, RUQ or epigastric pain  Her pregnancy was complicated by [facility] not on medication         Past Medical History:   Diagnosis Date   • ACL injury tear     Right knee   • Anxiety    • Asthma    • Depression    • Fibroid    • Miscarriage 9/25/20   • Pregnancy     resolved: 11/5/2015   • Varicella      Social History     Socioeconomic History   • Marital status: /Civil Union     Spouse name: Not on file   • Number of children: Not on file   • Years of education: Not on file   • Highest education level: Not on file   Occupational History   • Not on file   Tobacco Use   • Smoking status: Never   • Smokeless tobacco: Never   Vaping Use   • Vaping Use: Never used   Substance and Sexual Activity   • Alcohol use: Not Currently     Comment: social   • Drug use: Not Currently     Types: Marijuana     Comment: medical marijuana- not in months   • Sexual activity: Yes     Partners: Male     Birth control/protection: None   Other Topics Concern   • Not on file   Social History Narrative    Always uses seat belt    Exercise habits      Social Determinants of Health     Financial Resource Strain: Not on file   Food Insecurity: Not on file   Transportation Needs: Not on file   Physical Activity: Not on file   Stress: Not on file   Social Connections: Not on file   Intimate Partner Violence: Not on file   Housing Stability: Not on file     Family History   Problem Relation Age of Onset   • No Known Problems Mother    • Heart attack Father    • Hypertension Father    • Pancreatic cancer Father 61   • Cancer Father         pancreatic   • Celiac disease Sister    • Other Sister         idopathic thrombocytopenic purpura   • Lupus Sister    • No Known Problems Daughter    • Lung cancer Maternal Grandmother • Other Maternal Grandfather         renal failure   • Breast cancer Paternal Grandmother 54   • Breast cancer Paternal Aunt 46       Current Outpatient Medications:   •  acetaminophen (TYLENOL) 500 mg tablet, Take 500 mg by mouth every 6 (six) hours as needed for mild pain, Disp: , Rfl:   •  FLUoxetine (PROzac) 10 mg capsule, TAKE 1 CAPSULE BY MOUTH EVERY DAY, Disp: 90 capsule, Rfl: 1  •  NIFEdipine (PROCARDIA XL) 30 mg 24 hr tablet, Take 1 tablet (30 mg total) by mouth daily, Disp: 30 tablet, Rfl: 0  •  Prenatal Vit w/Ce-Imuzcrfre-CB (PNV PO), Take by mouth, Disp: , Rfl:   •  Restasis MultiDose 0 05 % ophthalmic emulsion, Administer 1 drop to both eyes every 12 (twelve) hours, Disp: , Rfl:   •  oxyCODONE (Roxicodone) 5 immediate release tablet, Take 1 tablet (5 mg total) by mouth every 4 (four) hours as needed for moderate pain for up to 10 days Max Daily Amount: 30 mg (Patient not taking: Reported on 11/23/2022), Disp: 5 tablet, Rfl: 0    O:  She appears well and is in no distress  Vitals:    11/23/22 1557   BP: 152/84     Abdomen is soft and nontender, incision c/d/i  Vulvar varicosity on left   + LE edema, has compression socks on    A/P:  Postpartum blood pressure check  To start procardia xl 30mg daily tonight and go for cbc/cmp now  Strict precautions reviewed  Follow up on Friday for BP check  Will get BP cuff

## 2022-11-25 ENCOUNTER — APPOINTMENT (OUTPATIENT)
Dept: LAB | Facility: AMBULARY SURGERY CENTER | Age: 42
End: 2022-11-25
Attending: OBSTETRICS & GYNECOLOGY

## 2022-11-25 ENCOUNTER — POSTPARTUM VISIT (OUTPATIENT)
Dept: OBGYN CLINIC | Facility: CLINIC | Age: 42
End: 2022-11-25

## 2022-11-25 VITALS
SYSTOLIC BLOOD PRESSURE: 144 MMHG | HEIGHT: 62 IN | BODY MASS INDEX: 37.73 KG/M2 | DIASTOLIC BLOOD PRESSURE: 92 MMHG | WEIGHT: 205 LBS

## 2022-11-25 DIAGNOSIS — Z01.30 BLOOD PRESSURE CHECK: ICD-10-CM

## 2022-11-25 DIAGNOSIS — R03.0 ELEVATED BLOOD PRESSURE READING: ICD-10-CM

## 2022-11-25 LAB
ALBUMIN SERPL BCP-MCNC: 2.4 G/DL (ref 3.5–5)
ALP SERPL-CCNC: 68 U/L (ref 46–116)
ALT SERPL W P-5'-P-CCNC: 28 U/L (ref 12–78)
ANION GAP SERPL CALCULATED.3IONS-SCNC: 7 MMOL/L (ref 4–13)
AST SERPL W P-5'-P-CCNC: 14 U/L (ref 5–45)
BASOPHILS # BLD AUTO: 0.04 THOUSANDS/ÂΜL (ref 0–0.1)
BASOPHILS NFR BLD AUTO: 1 % (ref 0–1)
BILIRUB SERPL-MCNC: 0.27 MG/DL (ref 0.2–1)
BUN SERPL-MCNC: 9 MG/DL (ref 5–25)
CALCIUM ALBUM COR SERPL-MCNC: 10.2 MG/DL (ref 8.3–10.1)
CALCIUM SERPL-MCNC: 8.9 MG/DL (ref 8.3–10.1)
CHLORIDE SERPL-SCNC: 109 MMOL/L (ref 96–108)
CO2 SERPL-SCNC: 24 MMOL/L (ref 21–32)
CREAT SERPL-MCNC: 0.6 MG/DL (ref 0.6–1.3)
EOSINOPHIL # BLD AUTO: 0.33 THOUSAND/ÂΜL (ref 0–0.61)
EOSINOPHIL NFR BLD AUTO: 4 % (ref 0–6)
ERYTHROCYTE [DISTWIDTH] IN BLOOD BY AUTOMATED COUNT: 17.5 % (ref 11.6–15.1)
GFR SERPL CREATININE-BSD FRML MDRD: 111 ML/MIN/1.73SQ M
GLUCOSE P FAST SERPL-MCNC: 88 MG/DL (ref 65–99)
HCT VFR BLD AUTO: 29.4 % (ref 34.8–46.1)
HGB BLD-MCNC: 8.9 G/DL (ref 11.5–15.4)
IMM GRANULOCYTES # BLD AUTO: 0.03 THOUSAND/UL (ref 0–0.2)
IMM GRANULOCYTES NFR BLD AUTO: 0 % (ref 0–2)
LYMPHOCYTES # BLD AUTO: 1.56 THOUSANDS/ÂΜL (ref 0.6–4.47)
LYMPHOCYTES NFR BLD AUTO: 19 % (ref 14–44)
MCH RBC QN AUTO: 25.9 PG (ref 26.8–34.3)
MCHC RBC AUTO-ENTMCNC: 30.3 G/DL (ref 31.4–37.4)
MCV RBC AUTO: 86 FL (ref 82–98)
MONOCYTES # BLD AUTO: 0.63 THOUSAND/ÂΜL (ref 0.17–1.22)
MONOCYTES NFR BLD AUTO: 8 % (ref 4–12)
NEUTROPHILS # BLD AUTO: 5.51 THOUSANDS/ÂΜL (ref 1.85–7.62)
NEUTS SEG NFR BLD AUTO: 68 % (ref 43–75)
NRBC BLD AUTO-RTO: 0 /100 WBCS
PLATELET # BLD AUTO: 481 THOUSANDS/UL (ref 149–390)
PMV BLD AUTO: 9.8 FL (ref 8.9–12.7)
POTASSIUM SERPL-SCNC: 3.8 MMOL/L (ref 3.5–5.3)
PROT SERPL-MCNC: 6.6 G/DL (ref 6.4–8.4)
RBC # BLD AUTO: 3.44 MILLION/UL (ref 3.81–5.12)
SODIUM SERPL-SCNC: 140 MMOL/L (ref 135–147)
WBC # BLD AUTO: 8.1 THOUSAND/UL (ref 4.31–10.16)

## 2022-11-25 NOTE — PROGRESS NOTES
Randy Castanedamark  1980    S:  35 y o  female here for blood pressure check  She is one week s/p primary LTCS with BS and myomectomy  Her pregnancy was complicated by cHTN not on medication  She was in the office on 11/23/22 for a BP check  At which time her BP was running 150s/80s  She was started on Procardia xl 30 mg daily at the time of her visit  She was advised to complete a cbc/cmp and start at home BP checks  She has plans to complete her labs this AM  She took her Procardia about 30 min prior to her appt today  BP today is 144/92  She denies HA, blurred vision, swelling       BP at home is running in 140s/90s over the past two days    Past Medical History:   Diagnosis Date   • ACL injury tear     Right knee   • Anxiety    • Asthma    • Depression    • Fibroid    • Miscarriage 9/25/20   • Pregnancy     resolved: 11/5/2015   • Varicella      Social History     Socioeconomic History   • Marital status: /Civil Union     Spouse name: Not on file   • Number of children: Not on file   • Years of education: Not on file   • Highest education level: Not on file   Occupational History   • Not on file   Tobacco Use   • Smoking status: Never   • Smokeless tobacco: Never   Vaping Use   • Vaping Use: Never used   Substance and Sexual Activity   • Alcohol use: Not Currently     Comment: social   • Drug use: Not Currently     Types: Marijuana     Comment: medical marijuana- not in months   • Sexual activity: Not Currently     Partners: Male     Birth control/protection: None   Other Topics Concern   • Not on file   Social History Narrative    Always uses seat belt    Exercise habits      Social Determinants of Health     Financial Resource Strain: Not on file   Food Insecurity: Not on file   Transportation Needs: Not on file   Physical Activity: Not on file   Stress: Not on file   Social Connections: Not on file   Intimate Partner Violence: Not on file   Housing Stability: Not on file     Family History Problem Relation Age of Onset   • No Known Problems Mother    • Heart attack Father    • Hypertension Father    • Pancreatic cancer Father 61   • Cancer Father         pancreatic   • Celiac disease Sister    • Other Sister         idopathic thrombocytopenic purpura   • Lupus Sister    • No Known Problems Daughter    • Lung cancer Maternal Grandmother    • Other Maternal Grandfather         renal failure   • Breast cancer Paternal Grandmother 54   • Breast cancer Paternal Aunt 46       Current Outpatient Medications:   •  acetaminophen (TYLENOL) 500 mg tablet, Take 500 mg by mouth every 6 (six) hours as needed for mild pain, Disp: , Rfl:   •  FLUoxetine (PROzac) 10 mg capsule, TAKE 1 CAPSULE BY MOUTH EVERY DAY, Disp: 90 capsule, Rfl: 1  •  NIFEdipine (PROCARDIA XL) 30 mg 24 hr tablet, Take 1 tablet (30 mg total) by mouth daily, Disp: 30 tablet, Rfl: 0  •  Prenatal Vit w/Um-Doqalyrjr-RA (PNV PO), Take by mouth, Disp: , Rfl:   •  Restasis MultiDose 0 05 % ophthalmic emulsion, Administer 1 drop to both eyes every 12 (twelve) hours, Disp: , Rfl:   •  oxyCODONE (Roxicodone) 5 immediate release tablet, Take 1 tablet (5 mg total) by mouth every 4 (four) hours as needed for moderate pain for up to 10 days Max Daily Amount: 30 mg (Patient not taking: Reported on 11/23/2022), Disp: 5 tablet, Rfl: 0    O:  She appears well and is in no distress  Vitals:    11/25/22 0739   BP: 144/92     Abdomen is soft and nontender  There is no edema in the feet, ankles or legs    A/P:  Postpartum blood pressure check  Continue present regimen  Patient advised to call with any orthostatic symptoms, headaches, increase in edema  Complete labs today  Continue at home checks  Advised to call with readings >150/100   Will plan to have pt RTO early next week for another BP check     Return in 1 week for recheck, sooner PRN

## 2022-11-27 ENCOUNTER — OFFICE VISIT (OUTPATIENT)
Dept: URGENT CARE | Age: 42
End: 2022-11-27

## 2022-11-27 ENCOUNTER — APPOINTMENT (OUTPATIENT)
Dept: RADIOLOGY | Age: 42
End: 2022-11-27

## 2022-11-27 VITALS
BODY MASS INDEX: 36.79 KG/M2 | SYSTOLIC BLOOD PRESSURE: 144 MMHG | OXYGEN SATURATION: 98 % | DIASTOLIC BLOOD PRESSURE: 65 MMHG | TEMPERATURE: 98 F | WEIGHT: 201.13 LBS | HEART RATE: 92 BPM

## 2022-11-27 DIAGNOSIS — R05.1 ACUTE COUGH: Primary | ICD-10-CM

## 2022-11-27 DIAGNOSIS — R05.1 ACUTE COUGH: ICD-10-CM

## 2022-11-27 RX ORDER — AZITHROMYCIN 250 MG/1
TABLET, FILM COATED ORAL
Qty: 6 TABLET | Refills: 0 | Status: SHIPPED | OUTPATIENT
Start: 2022-11-27 | End: 2022-12-01

## 2022-11-27 NOTE — PROGRESS NOTES
[facility]        NAME: Lucilla Lombard is a 35 y o  female  : 1980    MRN: [id number]  DATE: 2022  TIME: 4:03 PM    Assessment and Plan   Acute cough [R05 1]  1  Acute cough  XR chest pa & lateral    azithromycin (ZITHROMAX) 250 mg tablet      Chest x-ray reviewed, no abnormality noted, awaiting official read  Will start azithromycin  Patient advised to trial breast feeding-safe decongestants such as Delsym, Robitussin and Mucinex  May also try humidifier, stay well hydrated  Follow-up with OBGYN as soon as possible  Patient Instructions   Acute Cough   WHAT YOU NEED TO KNOW:   An acute cough can last up to 3 weeks  Common causes of an acute cough include a cold, allergies, or a lung infection  DISCHARGE INSTRUCTIONS:   Return to the emergency department if:   • You have trouble breathing or feel short of breath      • You cough up blood, or you see blood in your mucus      • You faint or feel weak or dizzy      • You have chest pain when you cough or take a deep breath      • You have new wheezing      Contact your healthcare provider if:   • You have a fever      • Your cough lasts longer than 4 weeks      • Your symptoms do not improve with treatment      • You have questions or concerns about your condition or care      Medicines:   • Medicines  may be needed to stop the cough, decrease swelling in your airways, or help open your airways  Medicine may also be given to help you cough up mucus  Ask your healthcare provider what over-the-counter medicines you can take  If you have an infection caused by bacteria, you may need antibiotics      • Take your medicine as directed  Contact your healthcare provider if you think your medicine is not helping or if you have side effects  Tell him or her if you are allergic to any medicine  Keep a list of the medicines, vitamins, and herbs you take  Include the amounts, and when and why you take them   Bring the list or the pill bottles to follow-up visits  Carry your medicine list with you in case of an emergency      Manage your symptoms:   • Do not smoke and stay away from others who smoke  Nicotine and other chemicals in cigarettes and cigars can cause lung damage and make your cough worse  Ask your healthcare provider for information if you currently smoke and need help to quit  E-cigarettes or smokeless tobacco still contain nicotine  Talk to your healthcare provider before you use these products      • Drink extra liquids as directed  Liquids will help thin and loosen mucus so you can cough it up  Liquids will also help prevent dehydration  Examples of good liquids to drink include water, fruit juice, and broth  Do not drink liquids that contain caffeine  Caffeine can increase your risk for dehydration  Ask your healthcare provider how much liquid to drink each day      • Rest as directed  Do not do activities that make your cough worse, such as exercise      • Use a humidifier or vaporizer  Use a cool mist humidifier or a vaporizer to increase air moisture in your home  This may make it easier for you to breathe and help decrease your cough      • Eat 2 to 5 mL of honey 2 times each day  Honey can help thin mucus and decrease your cough      • Use cough drops or lozenges  These can help decrease throat irritation and your cough      Follow up with your healthcare provider as directed:  Write down your questions so you remember to ask them during your visits  © Copyright [employer] 2022 Information is for End User's use only and may not be sold, redistributed or otherwise used for commercial purposes  All illustrations and images included in CareNotes® are the copyrighted property of A D A M [employer] Inc  or [employer]   The above information is an  only  It is not intended as medical advice for individual conditions or treatments   Talk to your doctor, nurse or pharmacist before following any medical regimen to see if it is safe and effective for you         Follow up with PCP in 3-5 days  Proceed to  ER if symptoms worsen  Chief Complaint     Chief Complaint   Patient presents with   • Cough     X 8 days         History of Present Illness       Patient is a 39-year-old female with past medical history significant for asthma, who presents for evaluation of cough and mild chest congestion over the past 8 days  Of note current she is status post  8 days ago  She reports occasional congestion, but the cough is generally dry  Is worse in the evening  She who took an at home COVID test which was negative  She denies fevers, chest pain, palpitations, shortness of breath  She has tried Mucinex, Nasonex with little relief  He reports that the cough will come in spurts and then be absent for prolonged period of time  She is currently breastfeeding  Cough  This is a new problem  The current episode started in the past 7 days  The problem has been gradually worsening  The problem occurs hourly  The cough is non-productive and productive of sputum  Associated symptoms include wheezing  Pertinent negatives include no chest pain, chills, ear congestion, ear pain, eye redness, fever, headaches, heartburn, hemoptysis, myalgias, nasal congestion, postnasal drip, rash, rhinorrhea, sore throat, shortness of breath, sweats or weight loss  The symptoms are aggravated by lying down  Risk factors for lung disease include animal exposure  There is no history of environmental allergies  Review of Systems   Review of Systems   Constitutional: Negative for chills, fatigue, fever and weight loss  HENT: Positive for congestion  Negative for ear discharge, ear pain, postnasal drip, rhinorrhea, sinus pressure, sinus pain, sneezing and sore throat  Eyes: Negative  Negative for pain, discharge, redness and itching  Respiratory: Positive for cough and wheezing   Negative for apnea, hemoptysis, choking, chest tightness, shortness of breath and stridor  Cardiovascular: Negative  Negative for chest pain and palpitations  Gastrointestinal: Negative  Negative for diarrhea, heartburn, nausea and vomiting  Endocrine: Negative  Negative for polydipsia, polyphagia and polyuria  Genitourinary: Negative  Negative for decreased urine volume and flank pain  Musculoskeletal: Negative  Negative for arthralgias, back pain, myalgias, neck pain and neck stiffness  Skin: Negative  Negative for color change and rash  Allergic/Immunologic: Negative  Negative for environmental allergies  Neurological: Negative  Negative for dizziness, facial asymmetry, light-headedness, numbness and headaches  Hematological: Negative  Negative for adenopathy  Psychiatric/Behavioral: Negative            Current Medications       Current Outpatient Medications:   •  acetaminophen (TYLENOL) 500 mg tablet, Take 500 mg by mouth every 6 (six) hours as needed for mild pain, Disp: , Rfl:   •  azithromycin (ZITHROMAX) 250 mg tablet, Take 2 tablets today then 1 tablet daily x 4 days, Disp: 6 tablet, Rfl: 0  •  FLUoxetine (PROzac) 10 mg capsule, TAKE 1 CAPSULE BY MOUTH EVERY DAY, Disp: 90 capsule, Rfl: 1  •  NIFEdipine (PROCARDIA XL) 30 mg 24 hr tablet, Take 1 tablet (30 mg total) by mouth daily, Disp: 30 tablet, Rfl: 0  •  Prenatal Vit w/Xm-Zmyanyywc-PQ (PNV PO), Take by mouth, Disp: , Rfl:   •  Restasis MultiDose 0 05 % ophthalmic emulsion, Administer 1 drop to both eyes every 12 (twelve) hours, Disp: , Rfl:   •  oxyCODONE (Roxicodone) 5 immediate release tablet, Take 1 tablet (5 mg total) by mouth every 4 (four) hours as needed for moderate pain for up to 10 days Max Daily Amount: 30 mg (Patient not taking: Reported on 11/23/2022), Disp: 5 tablet, Rfl: 0    Current Allergies     Allergies as of 11/27/2022 - Reviewed 11/27/2022   Allergen Reaction Noted   • Amoxicillin Hives 06/29/2012   • Other  03/13/2015   • Penicillins Hives 2015   • Sulfa antibiotics Hives 2012            The following portions of the patient's history were reviewed and updated as appropriate: allergies, current medications, past family history, past medical history, past social history, past surgical history and problem list      Past Medical History:   Diagnosis Date   • ACL injury tear     Right knee   • Anxiety    • Asthma    • Depression    • Fibroid    • Miscarriage 20   • Pregnancy     resolved: 2015   • Varicella        Past Surgical History:   Procedure Laterality Date   • ARTHROSCOPY KNEE     • BREAST SURGERY      reduction procedure   • [place]  DELIVERY ONLY N/A 2022    Procedure:  SECTION (); Surgeon: Johnie Lott MD;  Location: [facility];  Service: Obstetrics   • REDUCTION MAMMAPLASTY Bilateral    • TUBAL LIGATION Bilateral 2022    Procedure: LIGATION/COAGULATION TUBAL;  Surgeon: Johnie Lott MD;  Location: [facility];  Service: Obstetrics       Family History   Problem Relation Age of Onset   • No Known Problems Mother    • Heart attack Father    • Hypertension Father    • Pancreatic cancer Father 61   • Cancer Father         pancreatic   • Celiac disease Sister    • Other Sister         idopathic thrombocytopenic purpura   • Lupus Sister    • No Known Problems Daughter    • Lung cancer Maternal Grandmother    • Other Maternal Grandfather         renal failure   • Breast cancer Paternal Grandmother 54   • Breast cancer Paternal Aunt 53         Medications have been verified  Objective   /65   Pulse 92   Temp 98 °F (36 7 °C) (Temporal)   Wt 91 2 kg (201 lb 2 oz)   LMP 2022   SpO2 98%   BMI 36 79 kg/m²        Physical Exam     Physical Exam  Vitals and nursing note reviewed  Constitutional:       General: She is not in acute distress  Appearance: Normal appearance  She is not ill-appearing, toxic-appearing or diaphoretic        Interventions: She is not intubated  HENT:      Head: Normocephalic and atraumatic  Right Ear: Tympanic membrane, ear canal and external ear normal  There is no impacted cerumen  Left Ear: Tympanic membrane, ear canal and external ear normal  There is no impacted cerumen  Nose: Nose normal  No congestion or rhinorrhea  Mouth/Throat:      Mouth: Mucous membranes are moist       Pharynx: No oropharyngeal exudate or posterior oropharyngeal erythema  Eyes:      Extraocular Movements: Extraocular movements intact  Conjunctiva/sclera: Conjunctivae normal       Pupils: Pupils are equal, round, and reactive to light  Cardiovascular:      Rate and Rhythm: Normal rate and regular rhythm  Pulses: Normal pulses  Heart sounds: Normal heart sounds, S1 normal and S2 normal  Heart sounds not distant  No murmur heard  No friction rub  No gallop  Pulmonary:      Effort: Pulmonary effort is normal  No tachypnea, bradypnea, accessory muscle usage, prolonged expiration, respiratory distress or retractions  She is not intubated  Breath sounds: Normal breath sounds  No stridor, decreased air movement or transmitted upper airway sounds  No decreased breath sounds, wheezing, rhonchi or rales  Chest:      Chest wall: No tenderness  Abdominal:      General: Bowel sounds are normal       Palpations: Abdomen is soft  Tenderness: There is no abdominal tenderness  There is no guarding or rebound  Musculoskeletal:         General: Normal range of motion  Cervical back: Full passive range of motion without pain, normal range of motion and neck supple  No rigidity or tenderness  No spinous process tenderness or muscular tenderness  Normal range of motion  Lymphadenopathy:      Cervical: No cervical adenopathy  Right cervical: No superficial cervical adenopathy  Left cervical: No superficial cervical adenopathy  Skin:     General: Skin is warm and dry        Capillary Refill: Capillary refill takes less than 2 seconds  Neurological:      General: No focal deficit present  Mental Status: She is alert and oriented to person, place, and time  Cranial Nerves: No cranial nerve deficit     Psychiatric:         Mood and Affect: Mood normal          Behavior: Behavior normal

## 2022-11-27 NOTE — PATIENT INSTRUCTIONS
May continue the following decongestants which are considered to be relatively safe in breast-feeding:   -Dextromethorphan (Delsym, Robitussin)  -Mucinex

## 2022-11-29 ENCOUNTER — POSTPARTUM VISIT (OUTPATIENT)
Dept: OBGYN CLINIC | Facility: CLINIC | Age: 42
End: 2022-11-29

## 2022-11-29 VITALS — SYSTOLIC BLOOD PRESSURE: 130 MMHG | BODY MASS INDEX: 36.47 KG/M2 | DIASTOLIC BLOOD PRESSURE: 84 MMHG | WEIGHT: 199.4 LBS

## 2022-11-29 DIAGNOSIS — Z86.32 HISTORY OF GESTATIONAL DIABETES MELLITUS (GDM): ICD-10-CM

## 2022-11-29 NOTE — PROGRESS NOTES
Gill Every  1980    S:  The patient is a 35 y o  who presents for a postpartum visit  She is 2 weeks postpartum following a primary LTCS delivery with BS and myomectomy  The delivery was at 45 gestational weeks  I have fully reviewed the prenatal and intrapartum course  Complications include: GDMA2, cHTN, AMA, fetal macrosomia  Postpartum course has been complicated by PP blood pressure elevations  She was started on Procardia XL 30 mg QD at her 1 wk PP BP check  Since starting the medication reports normal readings at home  Highest at home readings have been in 140s/90s  Over the past week, since starting Procardia reports readings in 100s-130s/60s-80s  She denies HA, blurred vision, swelling  Baby is doing well  Baby is feeding by breast and formula  Bleeding has slowed to spotting  Bowel function is normal  Bladder function is normal  Patient has not been sexually active  She denies postpartum blues/depression  [place] Scale=2    Last Pap: 7/2020, neg/neg    Review of Systems   Respiratory: Negative  Cardiovascular: Negative  Gastrointestinal: Negative for constipation and diarrhea  Genitourinary: Negative for difficulty urinating, pelvic pain, vaginal bleeding, vaginal discharge, itching or odor        Past Medical History:   Diagnosis Date   • ACL injury tear     Right knee   • Anxiety    • Asthma    • Depression    • Fibroid    • Miscarriage 9/25/20   • Pregnancy     resolved: 11/5/2015   • Varicella      Family History   Problem Relation Age of Onset   • No Known Problems Mother    • Heart attack Father    • Hypertension Father    • Pancreatic cancer Father 61   • Cancer Father         pancreatic   • Celiac disease Sister    • Other Sister         idopathic thrombocytopenic purpura   • Lupus Sister    • No Known Problems Daughter    • Lung cancer Maternal Grandmother    • Other Maternal Grandfather         renal failure   • Breast cancer Paternal Grandmother 54 • Breast cancer Paternal Aunt 46     Social History     Socioeconomic History   • Marital status: /Civil Union     Spouse name: None   • Number of children: None   • Years of education: None   • Highest education level: None   Occupational History   • None   Tobacco Use   • Smoking status: Never   • Smokeless tobacco: Never   Vaping Use   • Vaping Use: Never used   Substance and Sexual Activity   • Alcohol use: Not Currently     Comment: social   • Drug use: Not Currently     Types: Marijuana     Comment: medical marijuana- not in months   • Sexual activity: Not Currently     Partners: Male     Birth control/protection: None   Other Topics Concern   • None   Social History Narrative    Always uses seat belt    Exercise habits      Social Determinants of Health     Financial Resource Strain: Not on file   Food Insecurity: Not on file   Transportation Needs: Not on file   Physical Activity: Not on file   Stress: Not on file   Social Connections: Not on file   Intimate Partner Violence: Not on file   Housing Stability: Not on file       O:   /84 (BP Location: Left arm, Patient Position: Sitting, Cuff Size: Standard)   Wt 90 4 kg (199 lb 6 4 oz)   LMP 2022   BMI 36 47 kg/m²     She appears well and in no distress  Abdomen is soft and nontender  External genitals are normal    A/P:  Postpartum visit  Hx GDM- 2 hour GTT placed  PP blood pressure elevations- continue Procardia 30 mg QD  RTO for 6 wk PP visit  Reviewed s/s of hypotension and advised pt to call if pressures are starting to run low on procardia  Contraception -    Had BS at time of   Pap up to date    She will return in 4 wks for 6 week PP exam, sooner PRN

## 2022-11-30 ENCOUNTER — OFFICE VISIT (OUTPATIENT)
Dept: FAMILY MEDICINE CLINIC | Facility: CLINIC | Age: 42
End: 2022-11-30

## 2022-11-30 VITALS
TEMPERATURE: 98.8 F | HEIGHT: 62 IN | OXYGEN SATURATION: 98 % | RESPIRATION RATE: 14 BRPM | HEART RATE: 105 BPM | DIASTOLIC BLOOD PRESSURE: 82 MMHG | SYSTOLIC BLOOD PRESSURE: 126 MMHG | WEIGHT: 199.2 LBS | BODY MASS INDEX: 36.66 KG/M2

## 2022-11-30 DIAGNOSIS — J06.9 UPPER RESPIRATORY TRACT INFECTION, UNSPECIFIED TYPE: Primary | ICD-10-CM

## 2022-11-30 NOTE — PROGRESS NOTES
Name: Cris Hemphill      : 1980      MRN: [id number]  Encounter Provider: KEEGAN Holguin  Encounter Date: 2022   Encounter department: [place] [id number]  Upper respiratory tract infection, unspecified type  Assessment & Plan: Onset approx , started on azithromycin  and taking delsym, mucinex as needed  Lack of change on abx supports likely viral illness  lungs clear, vitals stable  She should continue remaining dose tomorrow and monitor sx  If this does not seem like it is resolving by next week she can let me know  Continue otc meds as needed, encourage hydration  Subjective      Pt is a 35 y o  y/o female who is seen today for evaluation of cough x 2 weeks  PMH of GERD, hepatic steatosis, anxiety, HTN, asthma, gestational DM  She states the cough fluctuates between dry and wet  She was seen  at urgent care and had a chest xray which was normal and treated her with azithromycin and cough medicine  Tomorrow is the last day of abx  She took 2 neg covid tests  Review of Systems   Constitutional: Negative for appetite change, chills, fatigue and fever  HENT: Positive for rhinorrhea  Negative for congestion, ear pain, postnasal drip, sinus pressure and sore throat  Respiratory: Positive for cough  Negative for chest tightness, shortness of breath and wheezing  Cardiovascular: Negative for chest pain and palpitations  Gastrointestinal: Negative for diarrhea, nausea and vomiting  Musculoskeletal: Negative for myalgias  Neurological: Negative for dizziness, light-headedness and headaches  Hematological: Negative for adenopathy  Psychiatric/Behavioral: Negative for sleep disturbance         Current Outpatient Medications on File Prior to Visit   Medication Sig   • azithromycin (ZITHROMAX) 250 mg tablet Take 2 tablets today then 1 tablet daily x 4 days   • FLUoxetine (PROzac) 10 mg capsule TAKE 1 CAPSULE BY MOUTH EVERY DAY   • NIFEdipine (PROCARDIA XL) 30 mg 24 hr tablet Take 1 tablet (30 mg total) by mouth daily   • Prenatal Vit w/Oj-Atvmnbiap-HW (PNV PO) Take by mouth   • Restasis MultiDose 0 05 % ophthalmic emulsion Administer 1 drop to both eyes every 12 (twelve) hours   • acetaminophen (TYLENOL) 500 mg tablet Take 500 mg by mouth every 6 (six) hours as needed for mild pain (Patient not taking: Reported on 11/29/2022)       Objective     /82 (BP Location: Left arm, Patient Position: Sitting, Cuff Size: Standard)   Pulse 105   Temp 98 8 °F (37 1 °C) (Tympanic)   Resp 14   Ht 5' 2" (1 575 m)   Wt 90 4 kg (199 lb 3 2 oz)   LMP 02/23/2022   SpO2 98%   BMI 36 43 kg/m²     Physical Exam  Vitals reviewed  Constitutional:       General: She is awake  She is not in acute distress  Vital signs are normal       Appearance: Normal appearance  She is well-developed, well-groomed and well-nourished  She is not ill-appearing  HENT:      Head: Normocephalic  Right Ear: Hearing, tympanic membrane, ear canal and external ear normal  No middle ear effusion  Tympanic membrane is not bulging  Left Ear: Hearing, tympanic membrane, ear canal and external ear normal   No middle ear effusion  Tympanic membrane is not bulging  Nose: Congestion present  No mucosal edema or rhinorrhea  Right Turbinates: Enlarged  Left Turbinates: Enlarged  Mouth/Throat:      Lips: Pink  Mouth: Mucous membranes are moist  Mucous membranes are not dry  Pharynx: No oropharyngeal exudate, posterior oropharyngeal edema or posterior oropharyngeal erythema  Tonsils: No tonsillar abscesses  Eyes:      Conjunctiva/sclera: Conjunctivae normal    Cardiovascular:      Rate and Rhythm: Normal rate and regular rhythm  Pulses: Intact distal pulses  Heart sounds: Normal heart sounds  No murmur heard    Pulmonary:      Effort: Pulmonary effort is normal  No accessory muscle usage or respiratory distress  Breath sounds: Normal breath sounds  No decreased breath sounds, wheezing, rhonchi or rales  Musculoskeletal:         General: No edema  Lymphadenopathy:      Head:      Right side of head: No submental, submandibular, tonsillar, preauricular, posterior auricular or occipital adenopathy  Left side of head: No submental, submandibular, tonsillar, preauricular, posterior auricular or occipital adenopathy  Cervical: No cervical adenopathy  Skin:     General: Skin is warm, dry and intact  Neurological:      Mental Status: She is alert and oriented to person, place, and time  Psychiatric:         Attention and Perception: Attention normal          Mood and Affect: Mood and affect and mood normal          Speech: Speech normal          Behavior: Behavior normal  Behavior is cooperative  Thought Content:  Thought content normal          Cognition and Memory: Cognition and cognition and memory normal          Judgment: Judgment normal        KEEGAN Nichols

## 2022-11-30 NOTE — ASSESSMENT & PLAN NOTE
Onset approx 11/17, started on azithromycin 11/27 and taking delsym, mucinex as needed  Lack of change on abx supports likely viral illness  lungs clear, vitals stable  She should continue remaining dose tomorrow and monitor sx  If this does not seem like it is resolving by next week she can let me know  Continue otc meds as needed, encourage hydration

## 2022-12-06 ENCOUNTER — TELEPHONE (OUTPATIENT)
Dept: OBGYN CLINIC | Facility: CLINIC | Age: 42
End: 2022-12-06

## 2022-12-06 NOTE — TELEPHONE ENCOUNTER
PC placed to pt  mychart images reviewed  New onset of erythema on end of incision line with +odor  No pain, itching, drainage  Difficult to assess incision properly with photos alone today so recommended patient come in for a visit this week for incision check  Pt agreeable to plan   Chart to scheduling to search for an appointment

## 2022-12-06 NOTE — TELEPHONE ENCOUNTER
----- Message from Radha Galan sent at 12/6/2022  8:48 AM EST -----  Regarding: C section scar  Contact: [phone]  It is not burning or oozing but i just noticed the incision on the side that was sutured was allot more red today than it has been  I attached pictures  I have been making sure to dry it but I will try the hair dryer

## 2022-12-07 ENCOUNTER — POSTPARTUM VISIT (OUTPATIENT)
Dept: OBGYN CLINIC | Facility: CLINIC | Age: 42
End: 2022-12-07

## 2022-12-07 VITALS — WEIGHT: 194 LBS | HEIGHT: 62 IN | BODY MASS INDEX: 35.7 KG/M2

## 2022-12-07 DIAGNOSIS — Z48.89 ENCOUNTER FOR POSTOPERATIVE WOUND CHECK: Primary | ICD-10-CM

## 2022-12-07 DIAGNOSIS — I10 CHRONIC HYPERTENSION: ICD-10-CM

## 2022-12-07 PROBLEM — O24.414 INSULIN CONTROLLED GESTATIONAL DIABETES MELLITUS (GDM) IN THIRD TRIMESTER: Status: RESOLVED | Noted: 2022-05-17 | Resolved: 2022-12-07

## 2022-12-07 PROBLEM — Z3A.38 38 WEEKS GESTATION OF PREGNANCY: Status: RESOLVED | Noted: 2022-04-25 | Resolved: 2022-12-07

## 2022-12-07 NOTE — PROGRESS NOTES
Assessment/Plan:     Problem List Items Addressed This Visit        Cardiovascular and Mediastinum    Chronic hypertension   Other Visit Diagnoses     Encounter for postoperative wound check    -  Primary        - incision without evidence of infection at this time, recommend continued excellent care of incision, no abx  Likely reactive to knot at edge of incision  Potential small superficial separation, but unable to probe and without purulent discharge  - BP normal prior to procardia in AM measurements at home, low in office after dose today  Recommend d/c procardia at this time, continue to monitor BP in the office     RTO 2-3 weeks incision/bp check    Subjective:      Patient ID: Arlette Linder is a 35 y o  female  HPI  She presents today for postpartum incision check  She cannot see the incision, does not have pain, but notices an odor, so would like to be evaluated  She feels well overall  She is also hoping to discuss need for continued procardia  She checks her BP every morning, always wnl  SPB in 120s  Concerned that today's office BP somewhat low  The following portions of the patient's history were reviewed and updated as appropriate: allergies, current medications, past family history, past medical history, past social history, past surgical history and problem list     Review of Systems  no fevers/chills, blood/purulent discharge    Objective:  BP (P) 108/74 (BP Location: Left arm, Patient Position: Sitting, Cuff Size: Large)   Ht 5' 2" (1 575 m)   Wt 88 kg (194 lb)   LMP 02/23/2022   Breastfeeding Yes Comment: supplement formula  BMI 35 48 kg/m²      Physical Exam  Vitals reviewed  Constitutional:       Appearance: She is well-developed  HENT:      Head: Normocephalic  Cardiovascular:      Rate and Rhythm: Normal rate and regular rhythm  Pulmonary:      Effort: Pulmonary effort is normal    Abdominal:      Palpations: Abdomen is soft  Comments: Incision well healing  Pinpoint separation at right aspect, unable to probe  Scant serous material extruded, no induration/fluctuance  Surrounding yeast dermatitis without other erythema  Musculoskeletal:         General: Normal range of motion  Cervical back: Normal range of motion  Skin:     General: Skin is warm and dry  Neurological:      Mental Status: She is alert and oriented to person, place, and time     Psychiatric:         Behavior: Behavior normal

## 2022-12-15 DIAGNOSIS — R03.0 ELEVATED BLOOD PRESSURE READING: ICD-10-CM

## 2022-12-16 RX ORDER — NIFEDIPINE 30 MG/1
TABLET, EXTENDED RELEASE ORAL
Qty: 90 TABLET | Refills: 1 | Status: SHIPPED | OUTPATIENT
Start: 2022-12-16

## 2022-12-17 NOTE — LACTATION NOTE
This note was copied from a baby's chart  Met with mother to go over discharge breastfeeding booklet including the feeding log  Emphasized 8 or more (12) times to stimulate the breast in a 24 hour period, what to expect for the number of diapers per day of life and the progression of properties of the  stooling pattern  Reviewed breastfeeding and your lifestyle, storage and preparation of breast milk, how to keep you breast pump clean, the employed breastfeeding mother and paced bottle feeding handouts  Booklet included Breastfeeding Resources for after discharge including access to the number for the [address]  Discussed this as the best resource to contact for questions or concerns regarding breasts,  feedings, and breastmilk  Discussed s/s engorgement and how to manage with medications, additional feedings at the breast or pumping sessions as needed, and cool compresses as well as s/s and management of mastitis and when to contact physician  Reviewed booklet and feeding log, addressed questions related to DC teaching  Discussed importance of proper flange fit, hands on pumping ,and cycle pumping  Enc family to contact [address] for support after DC  Statement Selected

## 2022-12-30 ENCOUNTER — POSTPARTUM VISIT (OUTPATIENT)
Dept: OBGYN CLINIC | Facility: CLINIC | Age: 42
End: 2022-12-30

## 2022-12-30 VITALS — DIASTOLIC BLOOD PRESSURE: 82 MMHG | BODY MASS INDEX: 35.63 KG/M2 | WEIGHT: 194.8 LBS | SYSTOLIC BLOOD PRESSURE: 118 MMHG

## 2022-12-30 NOTE — PROGRESS NOTES
Stephania   1980    S:  The patient is a 38 y  o  who presents for a postpartum visit  She is 6 weeks postpartum following a primary LTCS delivery with BS and myomectomy  The delivery was at 45 gestational weeks  I have fully reviewed the prenatal and intrapartum course  Complications include: GDMA2, cHTN, AMA, fetal macrosomia  Postpartum course has been complicated by PP blood pressure elevations  She was started on Procardia XL 30 mg QD at her 1 wk PP BP check  Since starting the medication reports normal readings at home  Has not yet d/c Procardia  Normotensive today  She denies HA, blurred vision, swelling  Baby is doing well  Baby is feeding by breast and formula  Bleeding has stopped    Bowel function is normal  Bladder function is normal  Patient has not been sexually active  She denies postpartum blues/depression  Gove Scale=1    Last Pap: 7/2020, neg/neg    Review of Systems   Respiratory: Negative  Cardiovascular: Negative  Gastrointestinal: Negative for constipation and diarrhea  Genitourinary: Negative for difficulty urinating, pelvic pain, vaginal bleeding, vaginal discharge, itching or odor        Past Medical History:   Diagnosis Date   • ACL injury tear     Right knee   • Anxiety    • Asthma    • Depression    • Fibroid    • Miscarriage 9/25/20   • Pregnancy     resolved: 11/5/2015   • Varicella      Family History   Problem Relation Age of Onset   • No Known Problems Mother    • Heart attack Father    • Hypertension Father    • Pancreatic cancer Father 61   • Cancer Father         pancreatic   • Celiac disease Sister    • Other Sister         idopathic thrombocytopenic purpura   • Lupus Sister    • No Known Problems Daughter    • Lung cancer Maternal Grandmother    • Other Maternal Grandfather         renal failure   • Breast cancer Paternal Grandmother    • Breast cancer Paternal Aunt      Social History     Socioeconomic History   • Marital status: /Civil [employer]     Spouse name: None   • Number of children: None   • Years of education: None   • Highest education level: None   Occupational History   • None   Tobacco Use   • Smoking status: Never   • Smokeless tobacco: Never   Vaping Use   • Vaping Use: Never used   Substance and Sexual Activity   • Alcohol use: Not Currently     Comment: social   • Drug use: Not Currently     Types: Marijuana     Comment: medical marijuana- not in months   • Sexual activity: Not Currently     Partners: Male     Birth control/protection: Female Sterilization   Other Topics Concern   • None   Social History Narrative    Always uses seat belt    Exercise habits      Social Determinants of Health     Financial Resource Strain: Not on file   Food Insecurity: Not on file   Transportation Needs: Not on file   Physical Activity: Not on file   Stress: Not on file   Social Connections: Not on file   Intimate Partner Violence: Not on file   Housing Stability: Not on file       O:   /82 (BP Location: Left arm, Patient Position: Sitting, Cuff Size: Large)   Wt 88 4 kg (194 lb 12 8 oz)   LMP 2022   Breastfeeding Yes Comment: breast + formula  BMI 35 63 kg/m²     She appears well and in no distress  Abdomen is soft and nontender   incision well healed without erythema/drainage    A/P:  Postpartum visit  Hx GDM- 2 hour GTT already placed  PP blood pressure elevations- reviewed okay to d/c Procardia 30 mg QD  Continue Bp checks off medication  If has elevations off medication needs to follow up with her PCP  Contraception -    Had BS at time of   Pap up to date    She will return in 3 months for her yearly exam, sooner PRN

## 2023-02-09 ENCOUNTER — RA CDI HCC (OUTPATIENT)
Dept: OTHER | Facility: HOSPITAL | Age: 43
End: 2023-02-09

## 2023-02-09 NOTE — PROGRESS NOTES
[facility]  coding opportunities       Chart reviewed, no opportunity found: CHART REVIEWED, NO OPPORTUNITY FOUND   This is a reminder to address ALL HCC (risk adjustment) codes as found on active problem list for 2023 as patient scores reset to zero DIANNE         Patients Insurance        Commercial Insurance: [address] [place] [address]

## 2023-02-16 ENCOUNTER — OFFICE VISIT (OUTPATIENT)
Dept: FAMILY MEDICINE CLINIC | Facility: CLINIC | Age: 43
End: 2023-02-16

## 2023-02-16 VITALS
WEIGHT: 206 LBS | HEIGHT: 63 IN | SYSTOLIC BLOOD PRESSURE: 130 MMHG | HEART RATE: 84 BPM | OXYGEN SATURATION: 98 % | RESPIRATION RATE: 16 BRPM | TEMPERATURE: 99.9 F | BODY MASS INDEX: 36.5 KG/M2 | DIASTOLIC BLOOD PRESSURE: 80 MMHG

## 2023-02-16 DIAGNOSIS — F41.9 ANXIETY: ICD-10-CM

## 2023-02-16 DIAGNOSIS — Z12.31 ENCOUNTER FOR SCREENING MAMMOGRAM FOR BREAST CANCER: ICD-10-CM

## 2023-02-16 DIAGNOSIS — Z00.00 ANNUAL PHYSICAL EXAM: Primary | ICD-10-CM

## 2023-02-16 DIAGNOSIS — O99.019 MATERNAL ANEMIA, ANTEPARTUM: ICD-10-CM

## 2023-02-16 PROBLEM — J06.9 UPPER RESPIRATORY TRACT INFECTION: Status: RESOLVED | Noted: 2022-11-30 | Resolved: 2023-02-16

## 2023-02-16 NOTE — PROGRESS NOTES
[address] [address] [facility] [address]    NAME: Randi Hathaway  AGE: 38 y o  SEX: female  : 1980     DATE: 2023     Assessment and Plan:     Problem List Items Addressed This Visit        Other    Anxiety     Stable on prozac          Maternal anemia, antepartum     Had iron transfusion and supplement during pregnancy   Due for labs         Relevant Orders    CBC and differential   Other Visit Diagnoses     Annual physical exam    -  Primary    Relevant Orders    CBC and differential    Comprehensive metabolic panel    Hemoglobin A1C    Lipid panel    TSH, 3rd generation with Free T4 reflex    Encounter for screening mammogram for breast cancer        Relevant Orders    Mammo screening bilateral w 3d & cad          Immunizations and preventive care screenings were discussed with patient today  Appropriate education was printed on patient's after visit summary  Counseling:  Alcohol/drug use: discussed moderation in alcohol intake, the recommendations for healthy alcohol use, and avoidance of illicit drug use  Dental Health: discussed importance of regular tooth brushing, flossing, and dental visits  Injury prevention: discussed safety/seat belts, safety helmets, smoke detectors, carbon dioxide detectors, and smoking near bedding or upholstery  Sexual health: discussed sexually transmitted diseases, partner selection, use of condoms, avoidance of unintended pregnancy, and contraceptive alternatives  Exercise: the importance of regular exercise/physical activity was discussed  Recommend exercise 3-5 times per week for at least 30 minutes  BMI Counseling: Body mass index is 36 05 kg/m²  The BMI is above normal  Nutrition recommendations include decreasing portion sizes and encouraging healthy choices of fruits and vegetables  Exercise recommendations include moderate physical activity 150 minutes/week  No pharmacotherapy was ordered  Rationale for BMI follow-up plan is due to patient being overweight or obese  Depression Screening and Follow-up Plan: Patient was screened for depression during today's encounter  They screened negative with a PHQ-2 score of 0  No follow-ups on file  Chief Complaint:     Chief Complaint   Patient presents with   • Physical Exam     Patient is here today for an annual exam       History of Present Illness:     Adult Annual Physical   Patient here for a comprehensive physical exam  The patient reports no problems  7years old F and 1 months old M      Diet and Physical Activity  Diet/Nutrition: well balanced diet and consuming 3-5 servings of fruits/vegetables daily  Exercise: moderate cardiovascular exercise  Depression Screening  PHQ-2/9 Depression Screening    Little interest or pleasure in doing things: 0 - not at all  Feeling down, depressed, or hopeless: 0 - not at all  PHQ-2 Score: 0  PHQ-2 Interpretation: Negative depression screen       General Health  Sleep: sleeps well and gets 7-8 hours of sleep on average  Hearing: normal - bilateral   Vision: goes for regular eye exams  Dental: regular dental visits and brushes teeth twice daily  /GYN Health  Patient is: premenopausal  Last menstrual period: last week   Contraceptive method: tubal ligation   Review of Systems:     Review of Systems   Constitutional: Negative  HENT: Negative  Eyes: Negative  Respiratory: Negative  Cardiovascular: Negative  Gastrointestinal: Negative  Endocrine: Negative  Genitourinary: Negative  Musculoskeletal: Negative  Skin: Negative  Allergic/Immunologic: Negative  Neurological: Negative  Hematological: Negative  Psychiatric/Behavioral: Negative         Past Medical History:     Past Medical History:   Diagnosis Date   • ACL injury tear     Right knee   • Anxiety    • Asthma    • Depression    • Fibroid    • Miscarriage 9/25/20   • Pregnancy     resolved: 2015   • Varicella       Past Surgical History:     Past Surgical History:   Procedure Laterality Date   • ARTHROSCOPY KNEE     • BREAST SURGERY      reduction procedure   • OR  DELIVERY ONLY N/A 2022    Procedure:  SECTION ();   Surgeon: Esequiel Silver MD;  Location: [facility] ;  Service: Obstetrics   • REDUCTION MAMMAPLASTY Bilateral    • TUBAL LIGATION Bilateral 2022    Procedure: LIGATION/COAGULATION TUBAL;  Surgeon: Eseuqiel Silver MD;  Location: [facility] ;  Service: Obstetrics      Social History:     Social History     Socioeconomic History   • Marital status: /Civil Union     Spouse name: None   • Number of children: None   • Years of education: None   • Highest education level: None   Occupational History   • None   Tobacco Use   • Smoking status: Never   • Smokeless tobacco: Never   Vaping Use   • Vaping Use: Never used   Substance and Sexual Activity   • Alcohol use: Not Currently     Comment: social   • Drug use: Not Currently     Types: Marijuana     Comment: medical marijuana- not in months   • Sexual activity: Not Currently     Partners: Male     Birth control/protection: Female Sterilization   Other Topics Concern   • None   Social History Narrative    Always uses seat belt    Exercise habits      Social Determinants of Health     Financial Resource Strain: Not on file   Food Insecurity: Not on file   Transportation Needs: Not on file   Physical Activity: Not on file   Stress: Not on file   Social Connections: Not on file   Intimate Partner Violence: Not on file   Housing Stability: Not on file      Family History:     Family History   Problem Relation Age of Onset   • No Known Problems Mother    • Heart attack Father    • Hypertension Father    • Pancreatic cancer Father 61   • Cancer Father         pancreatic   • Celiac disease Sister    • Other Sister         idopathic thrombocytopenic purpura   • Lupus Sister    • No Known Problems Daughter    • Lung cancer Maternal Grandmother    • Other Maternal Grandfather         renal failure   • Breast cancer Paternal Grandmother    • Breast cancer Paternal Aunt       Current Medications:     Current Outpatient Medications   Medication Sig Dispense Refill   • FLUoxetine (PROzac) 10 mg capsule TAKE 1 CAPSULE BY MOUTH EVERY DAY 90 capsule 1   • Restasis MultiDose 0 05 % ophthalmic emulsion Administer 1 drop to both eyes every 12 (twelve) hours     • acetaminophen (TYLENOL) 500 mg tablet Take 500 mg by mouth every 6 (six) hours as needed for mild pain (Patient not taking: Reported on 11/29/2022)     • NIFEdipine (PROCARDIA XL) 30 mg 24 hr tablet TAKE 1 TABLET BY MOUTH EVERY DAY (Patient not taking: Reported on 2/16/2023) 90 tablet 1   • Prenatal Vit w/Kb-Vbsqbkujk-LC (PNV PO) Take by mouth (Patient not taking: Reported on 2/16/2023)       No current facility-administered medications for this visit  Allergies: Allergies   Allergen Reactions   • Amoxicillin Hives   • Other      Annotation - [id number]: paprika  blackberries   • Penicillins Hives   • Sulfa Antibiotics Hives      Physical Exam:     /80 (BP Location: Left arm, Patient Position: Sitting, Cuff Size: Large)   Pulse 84   Temp 99 9 °F (37 7 °C) (Tympanic)   Resp 16   Ht 5' 3 39" (1 61 m)   Wt 93 4 kg (206 lb)   SpO2 98%   Breastfeeding Yes   BMI 36 05 kg/m²     Physical Exam  Vitals and nursing note reviewed  Constitutional:       General: She is not in acute distress  Appearance: Normal appearance  She is well-developed  HENT:      Head: Normocephalic and atraumatic  Right Ear: Tympanic membrane normal       Nose: Nose normal       Mouth/Throat:      Mouth: Mucous membranes are moist    Eyes:      Conjunctiva/sclera: Conjunctivae normal    Cardiovascular:      Rate and Rhythm: Normal rate and regular rhythm  Heart sounds: No murmur heard    Pulmonary:      Effort: Pulmonary effort is normal  No respiratory distress  Breath sounds: Normal breath sounds  Abdominal:      Palpations: Abdomen is soft  Tenderness: There is no abdominal tenderness  Musculoskeletal:         General: No swelling  Cervical back: Normal range of motion and neck supple  Skin:     General: Skin is warm and dry  Capillary Refill: Capillary refill takes less than 2 seconds  Neurological:      Mental Status: She is alert     Psychiatric:         Mood and Affect: Mood normal           Maritza Atwood MD  [address] [facility]

## 2023-04-22 ENCOUNTER — APPOINTMENT (OUTPATIENT)
Dept: LAB | Facility: CLINIC | Age: 43
End: 2023-04-22

## 2023-04-22 DIAGNOSIS — Z00.00 ANNUAL PHYSICAL EXAM: ICD-10-CM

## 2023-04-22 LAB — TSH SERPL DL<=0.05 MIU/L-ACNC: 0.81 UIU/ML (ref 0.45–4.5)

## 2023-08-16 ENCOUNTER — OFFICE VISIT (OUTPATIENT)
Dept: FAMILY MEDICINE CLINIC | Facility: CLINIC | Age: 43
End: 2023-08-16
Payer: COMMERCIAL

## 2023-08-16 VITALS
DIASTOLIC BLOOD PRESSURE: 80 MMHG | SYSTOLIC BLOOD PRESSURE: 140 MMHG | BODY MASS INDEX: 38.34 KG/M2 | RESPIRATION RATE: 18 BRPM | WEIGHT: 216.4 LBS | HEART RATE: 97 BPM | HEIGHT: 63 IN | OXYGEN SATURATION: 98 % | TEMPERATURE: 99.4 F

## 2023-08-16 DIAGNOSIS — Z12.31 ENCOUNTER FOR SCREENING MAMMOGRAM FOR BREAST CANCER: ICD-10-CM

## 2023-08-16 DIAGNOSIS — E66.9 OBESITY (BMI 30-39.9): ICD-10-CM

## 2023-08-16 DIAGNOSIS — F41.9 ANXIETY: ICD-10-CM

## 2023-08-16 DIAGNOSIS — K21.9 GASTROESOPHAGEAL REFLUX DISEASE, UNSPECIFIED WHETHER ESOPHAGITIS PRESENT: ICD-10-CM

## 2023-08-16 DIAGNOSIS — I10 CHRONIC HYPERTENSION: Primary | ICD-10-CM

## 2023-08-16 PROBLEM — O99.210 OBESITY IN PREGNANCY, ANTEPARTUM: Status: RESOLVED | Noted: 2022-08-18 | Resolved: 2023-08-16

## 2023-08-16 PROBLEM — Z98.890 PONV (POSTOPERATIVE NAUSEA AND VOMITING): Status: RESOLVED | Noted: 2022-11-16 | Resolved: 2023-08-16

## 2023-08-16 PROBLEM — R11.2 PONV (POSTOPERATIVE NAUSEA AND VOMITING): Status: RESOLVED | Noted: 2022-11-16 | Resolved: 2023-08-16

## 2023-08-16 PROCEDURE — 99214 OFFICE O/P EST MOD 30 MIN: CPT | Performed by: FAMILY MEDICINE

## 2023-08-16 RX ORDER — HYDROCHLOROTHIAZIDE 25 MG/1
25 TABLET ORAL DAILY
Qty: 90 TABLET | Refills: 0 | Status: SHIPPED | OUTPATIENT
Start: 2023-08-16

## 2023-08-16 RX ORDER — PEN NEEDLE, DIABETIC 31 GX5/16"
NEEDLE, DISPOSABLE MISCELLANEOUS DAILY
Qty: 100 EACH | Refills: 0 | Status: SHIPPED | OUTPATIENT
Start: 2023-08-16

## 2023-08-16 NOTE — PROGRESS NOTES
Name: Omaira Duron      : 1980      MRN: [id number]  Encounter Provider: Jose Nixon MD  Encounter Date: 2023   Encounter department: [facility]     1. Chronic hypertension  Assessment & Plan:  Was on procardia while pregnant   Still elevated   To start HCTZ today   Recheck in 2 months     Orders:  -     hydrochlorothiazide (HYDRODIURIL) 25 mg tablet; Take 1 tablet (25 mg total) by mouth daily  -     Comprehensive metabolic panel    2. Encounter for screening mammogram for breast cancer  -     Mammo screening bilateral w 3d & cad; Future; Expected date: 2023    3. Obesity (BMI 30-39. 9)  Comments:  she tried diet and exercise for 6 months   myfitness pal < 1500 poly/day  30 minutes 5 days aweek of exercises   trial of saxenda  Orders:  -     liraglutide (SAXENDA) injection; Take 0.6 mg daily and go up 0.6 mg every week until reaches 3 mg  -     Insulin Pen Needle (B-D ULTRAFINE III SHORT PEN) 31G X 8 MM MISC; Use in the morning    4. Anxiety  Assessment & Plan:  She weaned herself off of prozac  Coping well       5. Gastroesophageal reflux disease, unspecified whether esophagitis present  Assessment & Plan:  Stable   Off of all meds             Subjective      HPI   Here for follow up  Weight gain post partum due trouble sleeping   Calories counting 9671-2998 poly/day  Trouble losing weight despite of eating healthy    Review of Systems   Constitutional: Negative. HENT: Negative. Eyes: Negative. Respiratory: Negative. Cardiovascular: Negative. Endocrine: Negative. Genitourinary: Negative. Musculoskeletal: Negative. Allergic/Immunologic: Negative. Neurological: Negative. Hematological: Negative. Psychiatric/Behavioral: Negative.         Current Outpatient Medications on File Prior to Visit   Medication Sig   • Restasis MultiDose 0.05 % ophthalmic emulsion Administer 1 drop to both eyes every 12 (twelve) hours   • acetaminophen (TYLENOL) 500 mg tablet Take 500 mg by mouth every 6 (six) hours as needed for mild pain (Patient not taking: Reported on 11/29/2022)   • NIFEdipine (PROCARDIA XL) 30 mg 24 hr tablet TAKE 1 TABLET BY MOUTH EVERY DAY (Patient not taking: Reported on 2/16/2023)   • Prenatal Vit w/Th-Smhdxsehn-ZE (PNV PO) Take by mouth (Patient not taking: Reported on 2/16/2023)   • [DISCONTINUED] FLUoxetine (PROzac) 10 mg capsule Take 1 capsule (10 mg total) by mouth daily (Patient not taking: Reported on 8/16/2023)       Objective     /80 (BP Location: Left arm, Patient Position: Sitting, Cuff Size: Standard)   Pulse 97   Temp 99.4 °F (37.4 °C) (Tympanic)   Resp 18   Ht 5' 3.39" (1.61 m)   Wt 98.2 kg (216 lb 6.4 oz)   LMP  (Exact Date) Comment: 7/23/2023  SpO2 98%   Breastfeeding No   BMI 37.86 kg/m²     Physical Exam  Constitutional:       Appearance: Normal appearance. Cardiovascular:      Rate and Rhythm: Normal rate and regular rhythm. Pulses: Normal pulses. Heart sounds: Normal heart sounds. Pulmonary:      Effort: Pulmonary effort is normal.      Breath sounds: Normal breath sounds. Neurological:      General: No focal deficit present. Mental Status: She is alert and oriented to person, place, and time.    Psychiatric:         Mood and Affect: Mood normal.         Behavior: Behavior normal.       Dru Addison MD

## 2023-08-18 DIAGNOSIS — E66.9 OBESITY (BMI 30-39.9): ICD-10-CM

## 2023-08-18 RX ORDER — SEMAGLUTIDE 1 MG/.5ML
INJECTION, SOLUTION SUBCUTANEOUS
Refills: 0 | OUTPATIENT
Start: 2023-08-18

## 2023-08-19 DIAGNOSIS — E66.9 OBESITY (BMI 30-39.9): ICD-10-CM

## 2023-08-20 RX ORDER — SEMAGLUTIDE 1 MG/.5ML
INJECTION, SOLUTION SUBCUTANEOUS
Refills: 0 | OUTPATIENT
Start: 2023-08-20

## 2023-09-28 ENCOUNTER — TELEPHONE (OUTPATIENT)
Dept: BARIATRICS | Facility: CLINIC | Age: 43
End: 2023-09-28

## 2023-09-28 NOTE — TELEPHONE ENCOUNTER
1st attempt-Online seminar completed. BMI is 37.86 according to epic. Patient is taking 2 medications for Hypertension therefore qualifies for surgery. Left vm to call office and schedule appt.

## 2023-10-03 ENCOUNTER — TELEPHONE (OUTPATIENT)
Dept: BARIATRICS | Facility: CLINIC | Age: 43
End: 2023-10-03

## 2023-10-03 NOTE — TELEPHONE ENCOUNTER
2nd attempt-Online seminar completed. BMI is 37.86 according to epic. Patient is taking 2 medications for Hypertension therefore qualifies for surgery.  Left vm to call office and schedule appt.

## 2023-10-09 ENCOUNTER — RA CDI HCC (OUTPATIENT)
Dept: OTHER | Facility: HOSPITAL | Age: 43
End: 2023-10-09

## 2023-10-09 NOTE — PROGRESS NOTES
[address] [facility] coding opportunities       Chart reviewed, no opportunity found: CHART REVIEWED, NO OPPORTUNITY FOUND        Patients Insurance        Commercial Insurance: [employer]

## 2023-10-16 ENCOUNTER — OFFICE VISIT (OUTPATIENT)
Dept: FAMILY MEDICINE CLINIC | Facility: CLINIC | Age: 43
End: 2023-10-16
Payer: COMMERCIAL

## 2023-10-16 VITALS
HEART RATE: 71 BPM | BODY MASS INDEX: 36.68 KG/M2 | SYSTOLIC BLOOD PRESSURE: 130 MMHG | WEIGHT: 207 LBS | HEIGHT: 63 IN | DIASTOLIC BLOOD PRESSURE: 90 MMHG | TEMPERATURE: 98.4 F | OXYGEN SATURATION: 98 % | RESPIRATION RATE: 17 BRPM

## 2023-10-16 DIAGNOSIS — I10 CHRONIC HYPERTENSION: ICD-10-CM

## 2023-10-16 DIAGNOSIS — K21.9 GASTROESOPHAGEAL REFLUX DISEASE, UNSPECIFIED WHETHER ESOPHAGITIS PRESENT: ICD-10-CM

## 2023-10-16 DIAGNOSIS — K76.0 HEPATIC STEATOSIS: ICD-10-CM

## 2023-10-16 DIAGNOSIS — F41.9 ANXIETY: Primary | ICD-10-CM

## 2023-10-16 DIAGNOSIS — Z23 ENCOUNTER FOR IMMUNIZATION: ICD-10-CM

## 2023-10-16 DIAGNOSIS — E66.9 OBESITY (BMI 30-39.9): ICD-10-CM

## 2023-10-16 PROCEDURE — 99214 OFFICE O/P EST MOD 30 MIN: CPT | Performed by: FAMILY MEDICINE

## 2023-10-16 PROCEDURE — 90686 IIV4 VACC NO PRSV 0.5 ML IM: CPT

## 2023-10-16 PROCEDURE — 90471 IMMUNIZATION ADMIN: CPT

## 2023-10-16 RX ORDER — HYDROCHLOROTHIAZIDE 25 MG/1
25 TABLET ORAL DAILY
Qty: 90 TABLET | Refills: 0 | Status: SHIPPED | OUTPATIENT
Start: 2023-10-16

## 2023-10-16 NOTE — PROGRESS NOTES
Name: Glenda Richey      : 1980      MRN: [id number]  Encounter Provider: Beba Pedraza MD  Encounter Date: 10/16/2023   Encounter department: [facility]     1. Anxiety  Assessment & Plan:  Stable  Coping well       2. Encounter for immunization  -     influenza vaccine, quadrivalent, 0.5 mL, preservative-free, for adult and pediatric patients 6 mos+ (AFLURIA, FLUARIX, FLULAVAL, FLUZONE)    3. Gastroesophageal reflux disease, unspecified whether esophagitis present  Assessment & Plan:  stable      4. Chronic hypertension  Assessment & Plan:  Blood pressure at home are running 128-130/70-80  To continue BP meds as directed    Orders:  -     hydrochlorothiazide (HYDRODIURIL) 25 mg tablet; Take 1 tablet (25 mg total) by mouth daily    5. Hepatic steatosis  Assessment & Plan:  Weight loss will help      6. Obesity (BMI 30-39. 9)  Assessment & Plan:  Diet , exercise  May try wegovy when its back in stock next year             Subjective   Here for follow up  Breakfast: eggs and cheese and apple   Lunch: sandwich or salad with protein and veggies  Dinner: meat, veggies and carbs  Calories around 2704-1941 poly/day     Hypertension  This is a chronic problem. The current episode started more than 1 year ago. The problem has been waxing and waning since onset. The problem is controlled. Pertinent negatives include no anxiety, blurred vision, chest pain, headaches, malaise/fatigue, neck pain, orthopnea, palpitations, peripheral edema, PND, shortness of breath or sweats. Review of Systems   Constitutional:  Negative for malaise/fatigue. Eyes:  Negative for blurred vision. Respiratory:  Negative for shortness of breath. Cardiovascular:  Negative for chest pain, palpitations, orthopnea and PND. Musculoskeletal:  Negative for neck pain. Neurological:  Negative for headaches.        Past Medical History:   Diagnosis Date   • ACL injury tear     Right knee   • Anxiety    • Asthma    • Depression    • Fibroid    • Miscarriage 20   • Pregnancy     resolved: 2015   • Varicella      Past Surgical History:   Procedure Laterality Date   • ARTHROSCOPY KNEE     • BREAST SURGERY      reduction procedure   • SD  DELIVERY ONLY N/A 2022    Procedure:  SECTION ();   Surgeon: Melinda Rodrigues MD;  Location: [facility] ;  Service: Obstetrics   • REDUCTION MAMMAPLASTY Bilateral    • TUBAL LIGATION Bilateral 2022    Procedure: LIGATION/COAGULATION TUBAL;  Surgeon: Melinda Rodrigues MD;  Location: [facility] ;  Service: Obstetrics     Family History   Problem Relation Age of Onset   • No Known Problems Mother    • Heart attack Father    • Hypertension Father    • Pancreatic cancer Father 61   • Cancer Father         pancreatic   • Celiac disease Sister    • Other Sister         idopathic thrombocytopenic purpura   • Lupus Sister    • No Known Problems Daughter    • Lung cancer Maternal Grandmother    • Other Maternal Grandfather         renal failure   • Breast cancer Paternal Grandmother    • Breast cancer Paternal Aunt      Social History     Socioeconomic History   • Marital status: /Civil Union     Spouse name: None   • Number of children: None   • Years of education: None   • Highest education level: None   Occupational History   • None   Tobacco Use   • Smoking status: Never   • Smokeless tobacco: Never   Vaping Use   • Vaping Use: Never used   Substance and Sexual Activity   • Alcohol use: Not Currently     Comment: social   • Drug use: Not Currently     Types: Marijuana     Comment: medical marijuana- not in months   • Sexual activity: Not Currently     Partners: Male     Birth control/protection: Female Sterilization   Other Topics Concern   • None   Social History Narrative    Always uses seat belt    Exercise habits      Social Determinants of Health     Financial Resource Strain: Not on file   Food Insecurity: Not on file Transportation Needs: Not on file   Physical Activity: Not on file   Stress: Not on file   Social Connections: Not on file   Intimate Partner Violence: Not on file   Housing Stability: Not on file     Current Outpatient Medications on File Prior to Visit   Medication Sig   • Restasis MultiDose 0.05 % ophthalmic emulsion Administer 1 drop to both eyes every 12 (twelve) hours   • [DISCONTINUED] hydrochlorothiazide (HYDRODIURIL) 25 mg tablet Take 1 tablet (25 mg total) by mouth daily   • [DISCONTINUED] acetaminophen (TYLENOL) 500 mg tablet Take 500 mg by mouth every 6 (six) hours as needed for mild pain (Patient not taking: Reported on 11/29/2022)   • [DISCONTINUED] Insulin Pen Needle (B-D ULTRAFINE III SHORT PEN) 31G X 8 MM MISC Use in the morning (Patient not taking: Reported on 10/16/2023)   • [DISCONTINUED] liraglutide (SAXENDA) injection Take 0.6 mg daily and go up 0.6 mg every week until reaches 3 mg (Patient not taking: Reported on 10/16/2023)   • [DISCONTINUED] NIFEdipine (PROCARDIA XL) 30 mg 24 hr tablet TAKE 1 TABLET BY MOUTH EVERY DAY (Patient not taking: Reported on 2/16/2023)   • [DISCONTINUED] Prenatal Vit w/Ig-Iopkdjqsr-KE (PNV PO) Take by mouth (Patient not taking: Reported on 2/16/2023)     Allergies   Allergen Reactions   • Amoxicillin Hives   • Other      Annotation - [id number]: paprika  blackberries   • Penicillins Hives   • Sulfa Antibiotics Hives     Immunization History   Administered Date(s) Administered   • COVID-19 MODERNA VACC 0.5 ML IM 02/05/2021, 03/05/2021   • INFLUENZA 11/14/2011, 12/02/2014, 01/07/2020, 12/15/2021, 01/18/2022   • Influenza, injectable, quadrivalent, preservative free 0.5 mL 11/06/2020, 11/03/2022, 10/16/2023   • Pneumococcal Polysaccharide PPV23 09/28/2015   • Tdap 08/18/2020, 09/27/2022       Objective     /90 (BP Location: Left arm, Patient Position: Sitting, Cuff Size: Standard)   Pulse 71   Temp 98.4 °F (36.9 °C) (Tympanic)   Resp 17   Ht 5' 3.39" (1.61 m)   Wt 93.9 kg (207 lb)   SpO2 98%   BMI 36.22 kg/m²     Physical Exam  Vitals and nursing note reviewed. Constitutional:       Appearance: Normal appearance. Cardiovascular:      Rate and Rhythm: Normal rate and regular rhythm. Pulses: Normal pulses. Heart sounds: Normal heart sounds. Pulmonary:      Effort: Pulmonary effort is normal.      Breath sounds: Normal breath sounds. Neurological:      General: No focal deficit present. Mental Status: She is alert and oriented to person, place, and time.    Psychiatric:         Mood and Affect: Mood normal.         Behavior: Behavior normal.       Beba Pedraza MD

## 2023-10-24 ENCOUNTER — HOSPITAL ENCOUNTER (OUTPATIENT)
Dept: RADIOLOGY | Age: 43
Discharge: HOME | End: 2023-10-24
Payer: COMMERCIAL

## 2023-10-24 DIAGNOSIS — Z12.31 ENCOUNTER FOR SCREENING MAMMOGRAM FOR BREAST CANCER: ICD-10-CM

## 2023-10-24 PROCEDURE — 77067 SCR MAMMO BI INCL CAD: CPT

## 2023-10-24 PROCEDURE — 77063 BREAST TOMOSYNTHESIS BI: CPT

## 2023-11-04 ENCOUNTER — APPOINTMENT (OUTPATIENT)
Dept: LAB | Facility: CLINIC | Age: 43
End: 2023-11-04
Payer: COMMERCIAL

## 2023-11-04 LAB
ALBUMIN SERPL BCP-MCNC: 4.2 G/DL (ref 3.5–5)
ALP SERPL-CCNC: 50 U/L (ref 34–104)
ALT SERPL W P-5'-P-CCNC: 23 U/L (ref 7–52)
ANION GAP SERPL CALCULATED.3IONS-SCNC: 9 MMOL/L
AST SERPL W P-5'-P-CCNC: 19 U/L (ref 13–39)
BILIRUB SERPL-MCNC: 0.41 MG/DL (ref 0.2–1)
BUN SERPL-MCNC: 16 MG/DL (ref 5–25)
CALCIUM SERPL-MCNC: 8.8 MG/DL (ref 8.4–10.2)
CHLORIDE SERPL-SCNC: 104 MMOL/L (ref 96–108)
CO2 SERPL-SCNC: 27 MMOL/L (ref 21–32)
CREAT SERPL-MCNC: 0.66 MG/DL (ref 0.6–1.3)
GFR SERPL CREATININE-BSD FRML MDRD: 107 ML/MIN/1.73SQ M
GLUCOSE P FAST SERPL-MCNC: 90 MG/DL (ref 65–99)
POTASSIUM SERPL-SCNC: 3.8 MMOL/L (ref 3.5–5.3)
PROT SERPL-MCNC: 6.8 G/DL (ref 6.4–8.4)
SODIUM SERPL-SCNC: 140 MMOL/L (ref 135–147)

## 2023-11-30 ENCOUNTER — OFFICE VISIT (OUTPATIENT)
Dept: FAMILY MEDICINE CLINIC | Facility: CLINIC | Age: 43
End: 2023-11-30
Payer: COMMERCIAL

## 2023-11-30 VITALS
WEIGHT: 213.6 LBS | SYSTOLIC BLOOD PRESSURE: 130 MMHG | HEIGHT: 63 IN | RESPIRATION RATE: 18 BRPM | OXYGEN SATURATION: 98 % | TEMPERATURE: 99.7 F | BODY MASS INDEX: 37.85 KG/M2 | HEART RATE: 82 BPM | DIASTOLIC BLOOD PRESSURE: 80 MMHG

## 2023-11-30 DIAGNOSIS — K21.00 GASTROESOPHAGEAL REFLUX DISEASE WITH ESOPHAGITIS WITHOUT HEMORRHAGE: ICD-10-CM

## 2023-11-30 DIAGNOSIS — J45.21: ICD-10-CM

## 2023-11-30 DIAGNOSIS — J30.1 NON-SEASONAL ALLERGIC RHINITIS DUE TO POLLEN: ICD-10-CM

## 2023-11-30 DIAGNOSIS — R05.2 SUBACUTE COUGH: Primary | ICD-10-CM

## 2023-11-30 DIAGNOSIS — I10 CHRONIC HYPERTENSION: ICD-10-CM

## 2023-11-30 PROCEDURE — 99214 OFFICE O/P EST MOD 30 MIN: CPT | Performed by: FAMILY MEDICINE

## 2023-11-30 RX ORDER — ALBUTEROL SULFATE 90 UG/1
2 AEROSOL, METERED RESPIRATORY (INHALATION) EVERY 6 HOURS PRN
Qty: 18 G | Refills: 0 | Status: SHIPPED | OUTPATIENT
Start: 2023-11-30

## 2023-11-30 RX ORDER — OMEPRAZOLE 40 MG/1
40 CAPSULE, DELAYED RELEASE ORAL
Qty: 30 CAPSULE | Refills: 5 | Status: SHIPPED | OUTPATIENT
Start: 2023-11-30 | End: 2024-05-28

## 2023-11-30 RX ORDER — AZELASTINE 1 MG/ML
1 SPRAY, METERED NASAL 2 TIMES DAILY
Qty: 30 ML | Refills: 0 | Status: SHIPPED | OUTPATIENT
Start: 2023-11-30

## 2023-11-30 NOTE — PROGRESS NOTES
Name: Vesta Gray      : 1980      MRN: [id number]  Encounter Provider: Vikas Garza MD  Encounter Date: 2023   Encounter department: [facility]     1. Subacute cough  Comments:  ? asthma vs. silent GERD  trial of inhaler and omeprazole    2. Mild intermittent occasional asthma with acute exacerbation  -     albuterol (ProAir HFA) 90 mcg/act inhaler; Inhale 2 puffs every 6 (six) hours as needed for shortness of breath    3. Gastroesophageal reflux disease with esophagitis without hemorrhage  -     omeprazole (PriLOSEC) 40 MG capsule; Take 1 capsule (40 mg total) by mouth daily before breakfast    4. Non-seasonal allergic rhinitis due to pollen  -     azelastine (ASTELIN) 0.1 % nasal spray; 1 spray into each nostril 2 (two) times a day Use in each nostril as directed    5. Chronic hypertension  Assessment & Plan:  Stable on current meds               Subjective     HPI  Hoarseness and cough for the last 1 month   Had a URI last month and cough lingers on since then   No shortness of breath or wheezing      Review of Systems   Constitutional: Negative. HENT: Negative. Respiratory:  Positive for cough. Negative for chest tightness, shortness of breath and wheezing. Musculoskeletal: Negative. Past Medical History:   Diagnosis Date   • ACL injury tear     Right knee   • Anxiety    • Asthma    • Depression    • Fibroid    • Miscarriage 18   • Pregnancy     resolved: 2015   • Varicella      Past Surgical History:   Procedure Laterality Date   • ARTHROSCOPY KNEE     • BREAST SURGERY      reduction procedure   • OR  DELIVERY ONLY N/A 2022    Procedure:  SECTION ();   Surgeon: Valdemar Stone MD;  Location: [facility] ;  Service: Obstetrics   • REDUCTION MAMMAPLASTY Bilateral    • TUBAL LIGATION Bilateral 2022    Procedure: LIGATION/COAGULATION TUBAL;  Surgeon: Valdemar Stone MD; Location: [facility] ;  Service: Obstetrics     Family History   Problem Relation Age of Onset   • No Known Problems Mother    • Heart attack Father    • Hypertension Father    • Pancreatic cancer Father 61   • Cancer Father         pancreatic   • Celiac disease Sister    • Other Sister         idopathic thrombocytopenic purpura   • Lupus Sister    • No Known Problems Daughter    • Lung cancer Maternal Grandmother    • Other Maternal Grandfather         renal failure   • Breast cancer Paternal Grandmother 61   • Breast cancer Paternal Aunt 46   • No Known Problems Son      Social History     Socioeconomic History   • Marital status: /Civil Union     Spouse name: None   • Number of children: None   • Years of education: None   • Highest education level: None   Occupational History   • None   Tobacco Use   • Smoking status: Never   • Smokeless tobacco: Never   Vaping Use   • Vaping Use: Never used   Substance and Sexual Activity   • Alcohol use: Not Currently     Comment: social   • Drug use: Not Currently     Types: Marijuana     Comment: medical marijuana- not in months   • Sexual activity: Not Currently     Partners: Male     Birth control/protection: Female Sterilization   Other Topics Concern   • None   Social History Narrative    Always uses seat belt    Exercise habits      Social Determinants of Health     Financial Resource Strain: Not on file   Food Insecurity: Not on file   Transportation Needs: Not on file   Physical Activity: Not on file   Stress: Not on file   Social Connections: Not on file   Intimate Partner Violence: Not on file   Housing Stability: Not on file     Current Outpatient Medications on File Prior to Visit   Medication Sig   • hydrochlorothiazide (HYDRODIURIL) 25 mg tablet Take 1 tablet (25 mg total) by mouth daily   • Restasis MultiDose 0.05 % ophthalmic emulsion Administer 1 drop to both eyes every 12 (twelve) hours     Allergies   Allergen Reactions   • Amoxicillin Hives   • Other Annotation - [id number]: paprika  blackberries   • Penicillins Hives   • Sulfa Antibiotics Hives     Immunization History   Administered Date(s) Administered   • COVID-19 MODERNA VACC 0.5 ML IM 02/05/2021, 03/05/2021   • INFLUENZA 11/14/2011, 12/02/2014, 01/07/2020, 12/15/2021, 01/18/2022   • Influenza, injectable, quadrivalent, preservative free 0.5 mL 11/06/2020, 11/03/2022, 10/16/2023   • Pneumococcal Polysaccharide PPV23 09/28/2015   • Tdap 08/18/2020, 09/27/2022       Objective     /80 (BP Location: Left arm, Patient Position: Sitting, Cuff Size: Standard)   Pulse 82   Temp 99.7 °F (37.6 °C) (Tympanic)   Resp 18   Ht 5' 3.39" (1.61 m)   Wt 96.9 kg (213 lb 9.6 oz)   SpO2 98%   BMI 37.37 kg/m²     Physical Exam  Vitals and nursing note reviewed. Constitutional:       Appearance: Normal appearance. HENT:      Nose: Nose normal.   Cardiovascular:      Rate and Rhythm: Normal rate and regular rhythm. Pulses: Normal pulses. Heart sounds: Normal heart sounds. Pulmonary:      Effort: Pulmonary effort is normal.      Breath sounds: Normal breath sounds. Neurological:      General: No focal deficit present. Mental Status: She is alert and oriented to person, place, and time.    Psychiatric:         Mood and Affect: Mood normal.         Behavior: Behavior normal.       Aide Encinas MD

## 2023-12-25 DIAGNOSIS — J45.21: ICD-10-CM

## 2023-12-25 DIAGNOSIS — K21.00 GASTROESOPHAGEAL REFLUX DISEASE WITH ESOPHAGITIS WITHOUT HEMORRHAGE: ICD-10-CM

## 2023-12-27 RX ORDER — ALBUTEROL SULFATE 90 UG/1
AEROSOL, METERED RESPIRATORY (INHALATION)
Qty: 20.1 G | Refills: 1 | Status: SHIPPED | OUTPATIENT
Start: 2023-12-27

## 2023-12-27 RX ORDER — OMEPRAZOLE 40 MG/1
40 CAPSULE, DELAYED RELEASE ORAL
Qty: 90 CAPSULE | Refills: 2 | Status: SHIPPED | OUTPATIENT
Start: 2023-12-27

## 2024-01-24 ENCOUNTER — OFFICE VISIT (OUTPATIENT)
Dept: INTERNAL MEDICINE CLINIC | Facility: CLINIC | Age: 44
End: 2024-01-24
Payer: COMMERCIAL

## 2024-01-24 VITALS
HEIGHT: 63 IN | DIASTOLIC BLOOD PRESSURE: 76 MMHG | SYSTOLIC BLOOD PRESSURE: 124 MMHG | BODY MASS INDEX: 37.85 KG/M2 | WEIGHT: 213.6 LBS

## 2024-01-24 DIAGNOSIS — E66.9 OBESITY (BMI 30-39.9): Primary | ICD-10-CM

## 2024-01-24 DIAGNOSIS — I10 CHRONIC HYPERTENSION: ICD-10-CM

## 2024-01-24 DIAGNOSIS — K76.0 HEPATIC STEATOSIS: ICD-10-CM

## 2024-01-24 PROBLEM — O99.019 MATERNAL ANEMIA, ANTEPARTUM: Status: RESOLVED | Noted: 2022-09-06 | Resolved: 2024-01-24

## 2024-01-24 PROBLEM — D25.9 UTERINE FIBROID IN PREGNANCY: Status: RESOLVED | Noted: 2022-05-26 | Resolved: 2024-01-24

## 2024-01-24 PROBLEM — O34.10 UTERINE FIBROID IN PREGNANCY: Status: RESOLVED | Noted: 2022-05-26 | Resolved: 2024-01-24

## 2024-01-24 PROBLEM — O09.523 MULTIGRAVIDA OF ADVANCED MATERNAL AGE IN THIRD TRIMESTER: Status: RESOLVED | Noted: 2022-04-25 | Resolved: 2024-01-24

## 2024-01-24 PROCEDURE — 99204 OFFICE O/P NEW MOD 45 MIN: CPT | Performed by: INTERNAL MEDICINE

## 2024-01-24 RX ORDER — TIRZEPATIDE 2.5 MG/.5ML
2.5 INJECTION, SOLUTION SUBCUTANEOUS WEEKLY
Qty: 2 ML | Refills: 0 | Status: SHIPPED | OUTPATIENT
Start: 2024-01-24 | End: 2024-01-25 | Stop reason: SDUPTHER

## 2024-01-24 NOTE — PROGRESS NOTES
Name: Humberto Lizarraga      : 1980      MRN: [id number]  Encounter Provider: Lea Reyes, MD  Encounter Date: 2024   Encounter department: [facility]    Assessment & Plan     1. Obesity (BMI 30-39.9)  -     tirzepatide (Zepbound) 2.5 mg/0.5 mL auto-injector; Inject 0.5 mL (2.5 mg total) under the skin once a week for 28 days  -     CBC and differential; Future; Expected date: 2024  -     Comprehensive metabolic panel; Future; Expected date: 2024  -     Lipid panel; Future; Expected date: 2024  -     Hemoglobin A1C; Future; Expected date: 2024    2. Hepatic steatosis    3. Chronic hypertension  Assessment & Plan:  Continue HCTZ             Subjective      Here to establish care  Concerned about her BP and weight  She was diagnosed with HTN after she had her baby a year ago  She has been on HCTZ 25mg  Also had gestational DM  Known fatty liver  She has followed calorie/carb restriction and has been down to 180lbs  Exercises regularly  Saxenda prescribed last year but was not available          Review of Systems   Constitutional:  Negative for unexpected weight change.   Respiratory:  Negative for shortness of breath.    Cardiovascular:  Negative for chest pain and palpitations.   Gastrointestinal:  Negative for abdominal pain, constipation and diarrhea.       Current Outpatient Medications on File Prior to Visit   Medication Sig   • albuterol (PROVENTIL HFA,VENTOLIN HFA) 90 mcg/act inhaler INHALE 2 PUFFS EVERY 6 HOURS AS NEEDED FOR SHORTNESS OF BREATH.   • azelastine (ASTELIN) 0.1 % nasal spray 1 spray into each nostril 2 (two) times a day Use in each nostril as directed   • hydrochlorothiazide (HYDRODIURIL) 25 mg tablet Take 1 tablet (25 mg total) by mouth daily   • Restasis MultiDose 0.05 % ophthalmic emulsion Administer 1 drop to both eyes every 12 (twelve) hours   • [DISCONTINUED] omeprazole (PriLOSEC) 40 MG capsule TAKE 1 CAPSULE BY MOUTH EVERY DAY  "BEFORE BREAKFAST (Patient not taking: Reported on 1/24/2024)       Objective     /76 (BP Location: Left arm, Patient Position: Sitting, Cuff Size: Large)   Ht 5' 3.39\" (1.61 m)   Wt 96.9 kg (213 lb 9.6 oz)   BMI 37.37 kg/m²     Physical Exam  Constitutional:       Appearance: She is well-developed. She is obese. She is not ill-appearing.   HENT:      Right Ear: External ear normal. There is no impacted cerumen.      Left Ear: External ear normal. There is no impacted cerumen.   Eyes:      Conjunctiva/sclera: Conjunctivae normal.   Cardiovascular:      Rate and Rhythm: Normal rate and regular rhythm.      Heart sounds: Normal heart sounds. No murmur heard.  Pulmonary:      Effort: Pulmonary effort is normal. No respiratory distress.      Breath sounds: Normal breath sounds. No wheezing or rales.   Abdominal:      General: There is no distension.      Palpations: Abdomen is soft. There is no mass.      Tenderness: There is no abdominal tenderness. There is no guarding or rebound.   Musculoskeletal:      Cervical back: Neck supple.      Right lower leg: No edema.      Left lower leg: No edema.   Neurological:      Mental Status: She is alert and oriented to person, place, and time.   Psychiatric:         Mood and Affect: Mood normal.         Behavior: Behavior normal.         Thought Content: Thought content normal.         Judgment: Judgment normal.       Lea Reyes, MD    "

## 2024-01-25 DIAGNOSIS — E66.9 OBESITY (BMI 30-39.9): ICD-10-CM

## 2024-01-25 RX ORDER — TIRZEPATIDE 2.5 MG/.5ML
2.5 INJECTION, SOLUTION SUBCUTANEOUS WEEKLY
Qty: 2 ML | Refills: 0 | Status: SHIPPED | OUTPATIENT
Start: 2024-01-25 | End: 2024-02-22

## 2024-01-25 NOTE — TELEPHONE ENCOUNTER
DUPLICATE REQUEST- PT is requesting ZEPBOUND be sent to [facility], not [facility], as they do not have it in stock.  Please advise

## 2024-02-03 DIAGNOSIS — I10 CHRONIC HYPERTENSION: ICD-10-CM

## 2024-02-03 RX ORDER — HYDROCHLOROTHIAZIDE 25 MG/1
25 TABLET ORAL DAILY
Qty: 90 TABLET | Refills: 1 | Status: SHIPPED | OUTPATIENT
Start: 2024-02-03

## 2024-02-15 DIAGNOSIS — E66.9 OBESITY (BMI 30-39.9): Primary | ICD-10-CM

## 2024-02-15 DIAGNOSIS — E66.9 OBESITY (BMI 30-39.9): ICD-10-CM

## 2024-02-15 RX ORDER — TIRZEPATIDE 5 MG/.5ML
5 INJECTION, SOLUTION SUBCUTANEOUS WEEKLY
Qty: 2 ML | Refills: 0 | Status: SHIPPED | OUTPATIENT
Start: 2024-02-15 | End: 2024-02-15

## 2024-02-15 RX ORDER — TIRZEPATIDE 5 MG/.5ML
5 INJECTION, SOLUTION SUBCUTANEOUS WEEKLY
Qty: 2 ML | Refills: 0 | Status: SHIPPED | OUTPATIENT
Start: 2024-02-15

## 2024-03-01 ENCOUNTER — NURSE TRIAGE (OUTPATIENT)
Age: 44
End: 2024-03-01

## 2024-03-01 DIAGNOSIS — R11.0 NAUSEA: Primary | ICD-10-CM

## 2024-03-01 RX ORDER — ONDANSETRON 4 MG/1
4 TABLET, FILM COATED ORAL EVERY 8 HOURS PRN
Qty: 20 TABLET | Refills: 1 | Status: SHIPPED | OUTPATIENT
Start: 2024-03-01

## 2024-03-01 NOTE — TELEPHONE ENCOUNTER
"Patient called in this morning to report nausea without vomiting overnight since she was started on Zepbound. Reports nausea worsening in the past week since dose increased; waking her up overnight and unable to get back to sleep. Reports taking michel tea and other products with no relief. Patient is requesting an order for medication to help with this symptom. Please follow up with patient.    Patient requesting PCP be changed to Dr. Reyes.    Reason for Disposition   Caller has URGENT medicine question about med that PCP or specialist prescribed and triager unable to answer question    Answer Assessment - Initial Assessment Questions  1. NAME of MEDICATION: \"What medicine are you calling about?\"      tirzepatide (Zepbound) 5 mg/0.5 mL auto-injector   2. QUESTION: \"What is your question?\" (e.g., medication refill, side effect)      Is this a medication reaction? Requesting medication for nausea  3. PRESCRIBING HCP: \"Who prescribed it?\" Reason: if prescribed by specialist, call should be referred to that group.      Lea Reyes, MD  4. SYMPTOMS: \"Do you have any symptoms?\"      Nausea overnight; wakes patient out of sleep; no vomiting for the past 1.5 months but worsening the past week with dose increase of medication  5. SEVERITY: If symptoms are present, ask \"Are they mild, moderate or severe?\"      Moderate  6. PREGNANCY:  \"Is there any chance that you are pregnant?\" \"When was your last menstrual period?\"      Denies    Protocols used: Medication Question Call-ADULT-[place]    "

## 2024-03-13 DIAGNOSIS — E66.9 OBESITY (BMI 30-39.9): Primary | ICD-10-CM

## 2024-03-13 RX ORDER — TIRZEPATIDE 7.5 MG/.5ML
7.5 INJECTION, SOLUTION SUBCUTANEOUS WEEKLY
Qty: 2 ML | Refills: 0 | Status: SHIPPED | OUTPATIENT
Start: 2024-03-13

## 2024-04-11 DIAGNOSIS — E66.9 OBESITY (BMI 30-39.9): Primary | ICD-10-CM

## 2024-04-11 RX ORDER — TIRZEPATIDE 10 MG/.5ML
10 INJECTION, SOLUTION SUBCUTANEOUS WEEKLY
Qty: 6 ML | Refills: 1 | Status: SHIPPED | OUTPATIENT
Start: 2024-04-11

## 2024-04-12 ENCOUNTER — TELEPHONE (OUTPATIENT)
Age: 44
End: 2024-04-12

## 2024-04-12 NOTE — TELEPHONE ENCOUNTER
Patient called and states called every pharmacy for Zepbound 10 mg and nobody has it, even her mail order carrier does not have it. Patient states has 7.5 for Tuesday 4/16/2024 but then has nothing. Would like a call back at [phone]

## 2024-04-15 DIAGNOSIS — E66.9 OBESITY (BMI 30-39.9): Primary | ICD-10-CM

## 2024-04-15 RX ORDER — TIRZEPATIDE 7.5 MG/.5ML
7.5 INJECTION, SOLUTION SUBCUTANEOUS WEEKLY
Qty: 2 ML | Refills: 0 | Status: SHIPPED | OUTPATIENT
Start: 2024-04-15

## 2024-05-06 DIAGNOSIS — E66.9 OBESITY (BMI 30-39.9): ICD-10-CM

## 2024-05-07 RX ORDER — TIRZEPATIDE 10 MG/.5ML
10 INJECTION, SOLUTION SUBCUTANEOUS WEEKLY
Qty: 2 ML | Refills: 0 | Status: SHIPPED | OUTPATIENT
Start: 2024-05-07 | End: 2024-05-09 | Stop reason: SDUPTHER

## 2024-05-08 DIAGNOSIS — E66.9 OBESITY (BMI 30-39.9): ICD-10-CM

## 2024-05-09 RX ORDER — TIRZEPATIDE 7.5 MG/.5ML
INJECTION, SOLUTION SUBCUTANEOUS
Qty: 2 ML | Refills: 0 | Status: SHIPPED | OUTPATIENT
Start: 2024-05-09

## 2024-05-09 RX ORDER — TIRZEPATIDE 10 MG/.5ML
10 INJECTION, SOLUTION SUBCUTANEOUS WEEKLY
Qty: 2 ML | Refills: 0 | Status: SHIPPED | OUTPATIENT
Start: 2024-05-09 | End: 2024-05-10 | Stop reason: SDUPTHER

## 2024-05-09 NOTE — TELEPHONE ENCOUNTER
[facility] has 10mg, advised pt and she would like us to send script there, please send

## 2024-05-09 NOTE — TELEPHONE ENCOUNTER
Pt would like to either stay on 7.5mg or move up to 10mg but unable to locate, will try to assist in finding either strength

## 2024-05-09 NOTE — TELEPHONE ENCOUNTER
Call her if she is requesting a refill of the same dose of Zepbound 7.5mg or the next higher dose of 10mg to [place]?

## 2024-05-10 DIAGNOSIS — E66.9 OBESITY (BMI 30-39.9): ICD-10-CM

## 2024-05-10 RX ORDER — TIRZEPATIDE 10 MG/.5ML
10 INJECTION, SOLUTION SUBCUTANEOUS WEEKLY
Qty: 2 ML | Refills: 0 | Status: SHIPPED | OUTPATIENT
Start: 2024-05-10

## 2024-05-21 ENCOUNTER — RA CDI HCC (OUTPATIENT)
Dept: OTHER | Facility: HOSPITAL | Age: 44
End: 2024-05-21

## 2024-05-21 NOTE — PROGRESS NOTES
HCC coding opportunities          Chart Reviewed number of suggestions sent to Provider: 1   J45.20    Patients Insurance        Commercial Insurance: [employer]

## 2024-05-28 ENCOUNTER — APPOINTMENT (OUTPATIENT)
Dept: LAB | Facility: CLINIC | Age: 44
End: 2024-05-28
Payer: COMMERCIAL

## 2024-05-28 DIAGNOSIS — E66.9 OBESITY (BMI 30-39.9): ICD-10-CM

## 2024-05-28 LAB
ALBUMIN SERPL BCP-MCNC: 4.2 G/DL (ref 3.5–5)
ALP SERPL-CCNC: 42 U/L (ref 34–104)
ALT SERPL W P-5'-P-CCNC: 12 U/L (ref 7–52)
ANION GAP SERPL CALCULATED.3IONS-SCNC: 7 MMOL/L (ref 4–13)
AST SERPL W P-5'-P-CCNC: 11 U/L (ref 13–39)
BASOPHILS # BLD AUTO: 0.02 THOUSANDS/ÂΜL (ref 0–0.1)
BASOPHILS NFR BLD AUTO: 0 % (ref 0–1)
BILIRUB SERPL-MCNC: 0.55 MG/DL (ref 0.2–1)
BUN SERPL-MCNC: 18 MG/DL (ref 5–25)
CALCIUM SERPL-MCNC: 9.1 MG/DL (ref 8.4–10.2)
CHLORIDE SERPL-SCNC: 104 MMOL/L (ref 96–108)
CHOLEST SERPL-MCNC: 201 MG/DL
CO2 SERPL-SCNC: 27 MMOL/L (ref 21–32)
CREAT SERPL-MCNC: 0.65 MG/DL (ref 0.6–1.3)
EOSINOPHIL # BLD AUTO: 0.1 THOUSAND/ÂΜL (ref 0–0.61)
EOSINOPHIL NFR BLD AUTO: 1 % (ref 0–6)
ERYTHROCYTE [DISTWIDTH] IN BLOOD BY AUTOMATED COUNT: 14.1 % (ref 11.6–15.1)
EST. AVERAGE GLUCOSE BLD GHB EST-MCNC: 114 MG/DL
GFR SERPL CREATININE-BSD FRML MDRD: 107 ML/MIN/1.73SQ M
GLUCOSE P FAST SERPL-MCNC: 84 MG/DL (ref 65–99)
HBA1C MFR BLD: 5.6 %
HCT VFR BLD AUTO: 39.4 % (ref 34.8–46.1)
HDLC SERPL-MCNC: 39 MG/DL
HGB BLD-MCNC: 13 G/DL (ref 11.5–15.4)
IMM GRANULOCYTES # BLD AUTO: 0.02 THOUSAND/UL (ref 0–0.2)
IMM GRANULOCYTES NFR BLD AUTO: 0 % (ref 0–2)
LDLC SERPL CALC-MCNC: 139 MG/DL (ref 0–100)
LYMPHOCYTES # BLD AUTO: 3.76 THOUSANDS/ÂΜL (ref 0.6–4.47)
LYMPHOCYTES NFR BLD AUTO: 46 % (ref 14–44)
MCH RBC QN AUTO: 27 PG (ref 26.8–34.3)
MCHC RBC AUTO-ENTMCNC: 33 G/DL (ref 31.4–37.4)
MCV RBC AUTO: 82 FL (ref 82–98)
MONOCYTES # BLD AUTO: 0.64 THOUSAND/ÂΜL (ref 0.17–1.22)
MONOCYTES NFR BLD AUTO: 8 % (ref 4–12)
NEUTROPHILS # BLD AUTO: 3.68 THOUSANDS/ÂΜL (ref 1.85–7.62)
NEUTS SEG NFR BLD AUTO: 45 % (ref 43–75)
NONHDLC SERPL-MCNC: 162 MG/DL
NRBC BLD AUTO-RTO: 0 /100 WBCS
PLATELET # BLD AUTO: 398 THOUSANDS/UL (ref 149–390)
PMV BLD AUTO: 9.4 FL (ref 8.9–12.7)
POTASSIUM SERPL-SCNC: 3.7 MMOL/L (ref 3.5–5.3)
PROT SERPL-MCNC: 7 G/DL (ref 6.4–8.4)
RBC # BLD AUTO: 4.82 MILLION/UL (ref 3.81–5.12)
SODIUM SERPL-SCNC: 138 MMOL/L (ref 135–147)
TRIGL SERPL-MCNC: 114 MG/DL
WBC # BLD AUTO: 8.22 THOUSAND/UL (ref 4.31–10.16)

## 2024-05-28 PROCEDURE — 83036 HEMOGLOBIN GLYCOSYLATED A1C: CPT

## 2024-05-28 PROCEDURE — 36415 COLL VENOUS BLD VENIPUNCTURE: CPT

## 2024-05-28 PROCEDURE — 80061 LIPID PANEL: CPT

## 2024-05-28 PROCEDURE — 85025 COMPLETE CBC W/AUTO DIFF WBC: CPT

## 2024-05-28 PROCEDURE — 80053 COMPREHEN METABOLIC PANEL: CPT

## 2024-05-29 ENCOUNTER — OFFICE VISIT (OUTPATIENT)
Dept: INTERNAL MEDICINE CLINIC | Facility: CLINIC | Age: 44
End: 2024-05-29
Payer: COMMERCIAL

## 2024-05-29 VITALS
BODY MASS INDEX: 31.64 KG/M2 | HEIGHT: 63 IN | DIASTOLIC BLOOD PRESSURE: 82 MMHG | WEIGHT: 178.6 LBS | SYSTOLIC BLOOD PRESSURE: 118 MMHG

## 2024-05-29 DIAGNOSIS — I10 CHRONIC HYPERTENSION: ICD-10-CM

## 2024-05-29 DIAGNOSIS — E66.9 OBESITY (BMI 30-39.9): ICD-10-CM

## 2024-05-29 PROCEDURE — 99214 OFFICE O/P EST MOD 30 MIN: CPT | Performed by: INTERNAL MEDICINE

## 2024-05-29 RX ORDER — HYDROCHLOROTHIAZIDE 25 MG/1
25 TABLET ORAL DAILY
Qty: 90 TABLET | Refills: 3 | Status: SHIPPED | OUTPATIENT
Start: 2024-05-29

## 2024-05-29 RX ORDER — TIRZEPATIDE 10 MG/.5ML
10 INJECTION, SOLUTION SUBCUTANEOUS WEEKLY
Qty: 6 ML | Refills: 3 | Status: SHIPPED | OUTPATIENT
Start: 2024-05-29

## 2024-05-29 NOTE — PROGRESS NOTES
Ambulatory Visit  Name: Humberto Lizarraga      : 1980      MRN: [id number]  Encounter Provider: Lea Reyes, MD  Encounter Date: 2024   Encounter [facility]: [facility]    Assessment & Plan   1. Obesity (BMI 30-39.9)  Assessment & Plan:  40lbs lost since starting Zepbound in January  Maintain Zepbound 10mg a week  Orders:  -     tirzepatide (Zepbound) 10 mg/0.5 mL auto-injector; Inject 0.5 mL (10 mg total) under the skin once a week  2. Chronic hypertension  Assessment & Plan:  BP remains controlled  It is not low so will continue the HCTZ  Orders:  -     hydroCHLOROthiazide 25 mg tablet; Take 1 tablet (25 mg total) by mouth daily         History of Present Illness     Started Zepbound in January and tolerating it well   She is on her second dose of 10mg  She has lost 40lbs   Labs reviewed and A1C, LDL unchanged        Review of Systems   Constitutional:  Negative for unexpected weight change.   Respiratory:  Negative for shortness of breath.    Cardiovascular:  Negative for chest pain and palpitations.   Gastrointestinal:  Negative for abdominal pain, constipation and diarrhea.   Neurological:  Negative for dizziness and headaches.     Past Medical History:   Diagnosis Date   • ACL injury tear     Right knee   • Anxiety    • Asthma    • Depression    • Fibroid    • Maternal anemia, antepartum    • Miscarriage 18   • Multigravida of advanced maternal age in third trimester    • Pregnancy     resolved: 2015   • Uterine fibroid in pregnancy    • Varicella      Past Surgical History:   Procedure Laterality Date   • ARTHROSCOPY KNEE     • BREAST SURGERY      reduction procedure   • ME  DELIVERY ONLY N/A 2022    Procedure:  SECTION ();  Surgeon: Annie Rodriguez MD;  Location: [facility] ;  Service: Obstetrics   • REDUCTION MAMMAPLASTY Bilateral    • TUBAL LIGATION Bilateral 2022    Procedure: LIGATION/COAGULATION TUBAL;  Surgeon:  Annie Rodriguez MD;  Location: [facility] ;  Service: Obstetrics     Family History   Problem Relation Age of Onset   • No Known Problems Mother    • Heart attack Father    • Hypertension Father    • Pancreatic cancer Father 63   • Cancer Father         pancreatic   • Celiac disease Sister    • Other Sister         idopathic thrombocytopenic purpura   • Lupus Sister    • No Known Problems Daughter    • Lung cancer Maternal Grandmother    • Other Maternal Grandfather         renal failure   • Breast cancer Paternal Grandmother 60   • Breast cancer Paternal Aunt 50   • No Known Problems Son      Social History     Tobacco Use   • Smoking status: Never   • Smokeless tobacco: Never   Vaping Use   • Vaping status: Never Used   Substance and Sexual Activity   • Alcohol use: Not Currently     Comment: social   • Drug use: Not Currently     Types: Marijuana     Comment: medical marijuana- not in months   • Sexual activity: Yes     Partners: Male     Birth control/protection: Female Sterilization     Current Outpatient Medications on File Prior to Visit   Medication Sig   • ondansetron (ZOFRAN) 4 mg tablet Take 1 tablet (4 mg total) by mouth every 8 (eight) hours as needed for nausea or vomiting   • Restasis MultiDose 0.05 % ophthalmic emulsion Administer 1 drop to both eyes every 12 (twelve) hours   • [DISCONTINUED] hydroCHLOROthiazide 25 mg tablet TAKE 1 TABLET (25 MG TOTAL) BY MOUTH DAILY.   • [DISCONTINUED] tirzepatide (Zepbound) 10 mg/0.5 mL auto-injector Inject 0.5 mL (10 mg total) under the skin once a week   • albuterol (PROVENTIL HFA,VENTOLIN HFA) 90 mcg/act inhaler INHALE 2 PUFFS EVERY 6 HOURS AS NEEDED FOR SHORTNESS OF BREATH.   • azelastine (ASTELIN) 0.1 % nasal spray 1 spray into each nostril 2 (two) times a day Use in each nostril as directed   • [DISCONTINUED] tirzepatide (Zepbound) 7.5 mg/0.5 mL auto-injector INJECT 0.5 ML UNDER THE SKIN ONCE A WEEK     Allergies   Allergen Reactions   • Amoxicillin Hives  "  • Other      Annotation - [id number]: paprika  blackbereric   • Penicillins Hives   • Sulfa Antibiotics Hives     Immunization History   Administered Date(s) Administered   • COVID-19 MODERNA VACC 0.5 ML IM 02/05/2021, 03/05/2021, 10/31/2021   • INFLUENZA 11/14/2011, 12/02/2014, 01/07/2020, 12/15/2021, 01/18/2022   • Influenza, injectable, quadrivalent, preservative free 0.5 mL 11/06/2020, 11/03/2022, 10/16/2023   • Pneumococcal Polysaccharide PPV23 09/28/2015   • Tdap 08/18/2020, 09/27/2022     Objective     /82 (BP Location: Left arm, Patient Position: Sitting, Cuff Size: Standard)   Ht 5' 3.39\" (1.61 m)   Wt 81 kg (178 lb 9.6 oz)   BMI 31.25 kg/m²     Physical Exam  Constitutional:       General: She is not in acute distress.     Appearance: She is well-developed. She is not ill-appearing, toxic-appearing or diaphoretic.   Eyes:      Conjunctiva/sclera: Conjunctivae normal.   Cardiovascular:      Rate and Rhythm: Normal rate and regular rhythm.      Heart sounds: Normal heart sounds. No murmur heard.  Pulmonary:      Effort: Pulmonary effort is normal. No respiratory distress.      Breath sounds: Normal breath sounds. No stridor. No wheezing or rales.   Abdominal:      General: There is no distension.      Palpations: Abdomen is soft. There is no mass.      Tenderness: There is no abdominal tenderness. There is no guarding or rebound.   Musculoskeletal:      Cervical back: Neck supple.      Right lower leg: No edema.      Left lower leg: No edema.   Neurological:      Mental Status: She is alert and oriented to person, place, and time.   Psychiatric:         Mood and Affect: Mood normal.         Behavior: Behavior normal.         Thought Content: Thought content normal.         Judgment: Judgment normal.       Administrative Statements         "

## 2024-06-11 ENCOUNTER — OFFICE VISIT (OUTPATIENT)
Dept: OBGYN CLINIC | Facility: CLINIC | Age: 44
End: 2024-06-11
Payer: COMMERCIAL

## 2024-06-11 VITALS
SYSTOLIC BLOOD PRESSURE: 104 MMHG | BODY MASS INDEX: 30.01 KG/M2 | HEIGHT: 64 IN | DIASTOLIC BLOOD PRESSURE: 72 MMHG | WEIGHT: 175.8 LBS

## 2024-06-11 DIAGNOSIS — Z01.419 WELL WOMAN EXAM WITH ROUTINE GYNECOLOGICAL EXAM: Primary | ICD-10-CM

## 2024-06-11 DIAGNOSIS — Z11.51 SCREENING FOR HPV (HUMAN PAPILLOMAVIRUS): ICD-10-CM

## 2024-06-11 DIAGNOSIS — R92.323 SCATTERED FIBROGLANDULAR TISSUE DENSITY OF BOTH BREASTS ON MAMMOGRAPHY: ICD-10-CM

## 2024-06-11 PROCEDURE — S0612 ANNUAL GYNECOLOGICAL EXAMINA: HCPCS | Performed by: PHYSICIAN ASSISTANT

## 2024-06-11 PROCEDURE — G0476 HPV COMBO ASSAY CA SCREEN: HCPCS | Performed by: PHYSICIAN ASSISTANT

## 2024-06-11 PROCEDURE — G0145 SCR C/V CYTO,THINLAYER,RESCR: HCPCS | Performed by: PHYSICIAN ASSISTANT

## 2024-06-11 NOTE — PROGRESS NOTES
Assessment   43 y.o.  presenting for annual exam.     Plan   Diagnoses and all orders for this visit:    Well woman exam with routine gynecological exam  -     Liquid-based pap, screening    Scattered fibroglandular tissue density of both breasts on mammography  -     US breast screening bilateral complete (ABUS); Future    Screening for HPV (human papillomavirus)  -     Liquid-based pap, screening        Pap collected today  Mammo scheduled. Offered ABUS given elevated lifetime risk 23% and fibroglandular density. Pt would like to pursue this supplemental screening. Advised to call insurance to determine coverage prior to scheduling   Colonoscopy up to date    Perineal hygiene reviewed. Weight bearing exercises minium of 150 mins/weekly advised. Kegel exercises recommended.   SBE encouraged, A yearly mammogram is recommended for breast cancer screening starting at age 40. ASCCP guidelines reviewed. Condoms encouraged with all sexual activity to prevent STI's. Gardisil vaccines recommended up to age 45. Calcium/ Vit D dietary requirements discussed.   Advised to call with any issues, all concerns & questions addressed.   See provided information in your after visit summary     F/U Annually and PRN    Results will be released to [facility], if abnormal will call or message to review and discuss treatment plan.     __________________________________________________________________    Subjective     Humberto Lizarraga is a 43 y.o.  presenting for annual exam. She is without complaint and does not want STD testing today.     SCREENING  Last Pap: 2020 neg/neg  Last Mammo: 10/24/2023 birads 1; 23 % lifetime risk; fibroglandular density tissue  Last Colonoscopy: 07/15/2021 5 year recall      GYN  Periods are regular every 25-30 days, lasting 5 days.    Sexually active: Yes - single partner - male  Contraception: tubal     Hx Abnormal pap: denies  We reviewed ASCCP guidelines for Pap testing today.    Denies  vaginal discharge, itching, odor, dyspareunia, pelvic pain and vulvar/vaginal symptoms      OB           Complaints: denies   Denies urgency, frequency, hematuria, leakage / change in stream, difficulty urinating.       BREAST  Complaints: denies   Denies: breast lump, breast tenderness, nipple discharge, skin color change, and skin lesion(s)  Personal hx: hx of reduction      Pertinent Family Hx:   Family hx of breast cancer: PGM (60), paternal aunt (50)  Family hx of ovarian cancer: no  Family hx of colon cancer: no      GENERAL  PMH reviewed/updated and is as below.   Patient does follow with a PCP.    SOCIAL  Smoking: no  Alcohol:social  Drug: hx of medical THC - has not used in 2 years  Occupation: teacher- high school      Past Medical History:   Diagnosis Date    ACL injury tear     Right knee    Anxiety     Asthma     Depression     Fibroid     Maternal anemia, antepartum     Miscarriage 18    Multigravida of advanced maternal age in third trimester     Pregnancy     resolved: 2015    Uterine fibroid in pregnancy     Varicella        Past Surgical History:   Procedure Laterality Date    ARTHROSCOPY KNEE      BREAST SURGERY      reduction procedure    OR  DELIVERY ONLY N/A 2022    Procedure:  SECTION ();  Surgeon: Annie Rodriguez MD;  Location: [facility];  Service: Obstetrics    REDUCTION MAMMAPLASTY Bilateral     TUBAL LIGATION Bilateral 2022    Procedure: LIGATION/COAGULATION TUBAL;  Surgeon: Annie Rodriguez MD;  Location: [facility] ;  Service: Obstetrics         Current Outpatient Medications:     albuterol (PROVENTIL HFA,VENTOLIN HFA) 90 mcg/act inhaler, INHALE 2 PUFFS EVERY 6 HOURS AS NEEDED FOR SHORTNESS OF BREATH., Disp: 20.1 g, Rfl: 1    azelastine (ASTELIN) 0.1 % nasal spray, 1 spray into each nostril 2 (two) times a day Use in each nostril as directed, Disp: 30 mL, Rfl: 0    hydroCHLOROthiazide 25 mg tablet, Take 1 tablet (25 mg total)  by mouth daily, Disp: 90 tablet, Rfl: 3    ondansetron (ZOFRAN) 4 mg tablet, Take 1 tablet (4 mg total) by mouth every 8 (eight) hours as needed for nausea or vomiting, Disp: 20 tablet, Rfl: 1    Restasis MultiDose 0.05 % ophthalmic emulsion, Administer 1 drop to both eyes every 12 (twelve) hours, Disp: , Rfl:     tirzepatide (Zepbound) 10 mg/0.5 mL auto-injector, Inject 0.5 mL (10 mg total) under the skin once a week, Disp: 6 mL, Rfl: 3    Allergies   Allergen Reactions    Amoxicillin Hives    Other      Annotation - [id number]: paprika  blackberries    Penicillins Hives    Sulfa Antibiotics Hives       Social History     Socioeconomic History    Marital status: /Civil Union     Spouse name: Not on file    Number of children: Not on file    Years of education: Not on file    Highest education level: Not on file   Occupational History    Not on file   Tobacco Use    Smoking status: Never    Smokeless tobacco: Never   Vaping Use    Vaping status: Never Used   Substance and Sexual Activity    Alcohol use: Not Currently     Comment: social    Drug use: Not Currently     Types: Marijuana     Comment: medical marijuana- not in months    Sexual activity: Yes     Partners: Male     Birth control/protection: None, Female Sterilization   Other Topics Concern    Not on file   Social History Narrative    Always uses seat belt    Exercise habits      Social Determinants of Health     Financial Resource Strain: Not on file   Food Insecurity: Not on file   Transportation Needs: Not on file   Physical Activity: Not on file   Stress: Not on file   Social Connections: Not on file   Intimate Partner Violence: Not on file   Housing Stability: Not on file       Review of Systems     ROS:  Constitutional: Negative for fatigue and unexpected weight change.   Respiratory: Negative for cough and shortness of breath.    Cardiovascular: Negative for chest pain and palpitations.   Gastrointestinal: Negative for abdominal pain and change  "in bowel habits  Breasts:  Negative, other than as noted above.   Genitourinary: Negative, other than as noted above.   Psychiatric: Negative for mood difficulties.      Objective      /72 (BP Location: Left arm, Patient Position: Sitting, Cuff Size: Standard)   Ht 5' 3.75\" (1.619 m)   Wt 79.7 kg (175 lb 12.8 oz)   LMP 05/30/2024   BMI 30.41 kg/m²     Physical Examination:    Patient appears well and is not in distress  Neck is supple without masses, no cervical or supraclavicular lymphadenopathy  Cardiovascular: regular rate and rhythm; no murmurs  Lungs: clear to auscultation bilaterally; no wheezes  Breasts are symmetrical without mass, tenderness, nipple discharge, skin changes or adenopathy.   Abdomen is soft and nontender without masses.   External genitals are normal without lesions or rashes.  Urethral meatus and urethra are normal  Bladder is normal to palpation  Vagina is normal without discharge or bleeding.   Cervix is normal without discharge or lesion.   Uterus is normal, mobile, nontender without palpable mass.  Adnexa are normal, nontender, without palpable mass.                 "

## 2024-06-19 DIAGNOSIS — E66.9 OBESITY (BMI 30-39.9): ICD-10-CM

## 2024-06-19 LAB
LAB AP GYN PRIMARY INTERPRETATION: NORMAL
Lab: NORMAL

## 2024-06-20 RX ORDER — TIRZEPATIDE 10 MG/.5ML
10 INJECTION, SOLUTION SUBCUTANEOUS WEEKLY
Qty: 6 ML | Refills: 0 | Status: SHIPPED | OUTPATIENT
Start: 2024-06-20 | End: 2024-06-25

## 2024-06-25 DIAGNOSIS — E66.9 OBESITY (BMI 30-39.9): Primary | ICD-10-CM

## 2024-06-25 RX ORDER — TIRZEPATIDE 12.5 MG/.5ML
12.5 INJECTION, SOLUTION SUBCUTANEOUS WEEKLY
Qty: 2 ML | Refills: 0 | Status: SHIPPED | OUTPATIENT
Start: 2024-06-25

## 2024-07-12 DIAGNOSIS — E66.9 OBESITY (BMI 30-39.9): ICD-10-CM

## 2024-07-12 RX ORDER — TIRZEPATIDE 12.5 MG/.5ML
INJECTION, SOLUTION SUBCUTANEOUS
Qty: 2 ML | Refills: 0 | Status: SHIPPED | OUTPATIENT
Start: 2024-07-12

## 2024-07-28 DIAGNOSIS — E66.9 OBESITY (BMI 30-39.9): ICD-10-CM

## 2024-07-29 RX ORDER — TIRZEPATIDE 12.5 MG/.5ML
INJECTION, SOLUTION SUBCUTANEOUS
Qty: 2 ML | Refills: 0 | Status: SHIPPED | OUTPATIENT
Start: 2024-07-29

## 2024-09-06 DIAGNOSIS — E66.9 OBESITY (BMI 30-39.9): ICD-10-CM

## 2024-09-08 RX ORDER — TIRZEPATIDE 12.5 MG/.5ML
INJECTION, SOLUTION SUBCUTANEOUS
Qty: 2 ML | Refills: 0 | Status: SHIPPED | OUTPATIENT
Start: 2024-09-08

## 2024-09-23 DIAGNOSIS — E66.9 OBESITY (BMI 30-39.9): ICD-10-CM

## 2024-09-24 RX ORDER — TIRZEPATIDE 12.5 MG/.5ML
INJECTION, SOLUTION SUBCUTANEOUS
Qty: 2 ML | Refills: 3 | Status: SHIPPED | OUTPATIENT
Start: 2024-09-24

## 2024-11-05 ENCOUNTER — RA CDI HCC (OUTPATIENT)
Dept: OTHER | Facility: HOSPITAL | Age: 44
End: 2024-11-05

## 2024-11-12 ENCOUNTER — OFFICE VISIT (OUTPATIENT)
Dept: INTERNAL MEDICINE CLINIC | Facility: CLINIC | Age: 44
End: 2024-11-12
Payer: COMMERCIAL

## 2024-11-12 VITALS
HEIGHT: 63 IN | TEMPERATURE: 97.5 F | SYSTOLIC BLOOD PRESSURE: 100 MMHG | HEART RATE: 80 BPM | OXYGEN SATURATION: 95 % | DIASTOLIC BLOOD PRESSURE: 74 MMHG | BODY MASS INDEX: 28.39 KG/M2 | WEIGHT: 160.2 LBS

## 2024-11-12 DIAGNOSIS — I10 CHRONIC HYPERTENSION: ICD-10-CM

## 2024-11-12 DIAGNOSIS — Z23 ENCOUNTER FOR IMMUNIZATION: ICD-10-CM

## 2024-11-12 DIAGNOSIS — Z00.00 ANNUAL PHYSICAL EXAM: Primary | ICD-10-CM

## 2024-11-12 DIAGNOSIS — E66.9 OBESITY (BMI 30-39.9): ICD-10-CM

## 2024-11-12 PROCEDURE — 90656 IIV3 VACC NO PRSV 0.5 ML IM: CPT

## 2024-11-12 PROCEDURE — 90471 IMMUNIZATION ADMIN: CPT

## 2024-11-12 PROCEDURE — 99396 PREV VISIT EST AGE 40-64: CPT | Performed by: INTERNAL MEDICINE

## 2024-11-12 RX ORDER — TIRZEPATIDE 10 MG/.5ML
10 INJECTION, SOLUTION SUBCUTANEOUS WEEKLY
Qty: 2 ML | Refills: 11 | Status: SHIPPED | OUTPATIENT
Start: 2024-11-12

## 2024-11-12 RX ORDER — TIRZEPATIDE 10 MG/.5ML
10 INJECTION, SOLUTION SUBCUTANEOUS WEEKLY
Qty: 2 ML | Refills: 11 | Status: SHIPPED | OUTPATIENT
Start: 2024-11-12 | End: 2024-11-12 | Stop reason: SDUPTHER

## 2024-11-12 NOTE — ASSESSMENT & PLAN NOTE
Orders:    tirzepatide (Zepbound) 10 mg/0.5 mL auto-injector; Inject 0.5 mL (10 mg total) under the skin once a week    CBC and differential; Future    Comprehensive metabolic panel; Future    Lipid panel; Future    Hemoglobin A1C; Future

## 2024-11-12 NOTE — PROGRESS NOTES
Adult Annual Physical  Name: Humberto Lizarraga      : 1980      MRN: [id number]  Encounter Provider: Lea Reyes, MD  Encounter Date: 2024   Encounter department: [facility]    Assessment & Plan  Annual physical exam         Obesity (BMI 30-39.9)    Orders:    tirzepatide (Zepbound) 10 mg/0.5 mL auto-injector; Inject 0.5 mL (10 mg total) under the skin once a week    CBC and differential; Future    Comprehensive metabolic panel; Future    Lipid panel; Future    Hemoglobin A1C; Future    Encounter for immunization    Orders:    influenza vaccine preservative-free 0.5 mL IM (Fluzone, Afluria, Fluarix, Flulaval)    Chronic hypertension  Blood pressure now low normal since she has lost 50 pounds on Zepbound  She may discontinue HCTZ and monitor her blood pressure  Resume HCTZ if her blood pressure is averaging greater than 120/80         Immunizations and preventive care screenings were discussed with patient today. Appropriate education was printed on patient's after visit summary.    Counseling:  Continue healthy diet and regular exercise         History of Present Illness     Adult Annual Physical:  Patient presents for annual physical.     Diet and Physical Activity:  - Diet/Nutrition: well balanced diet.  - Exercise: 5-7 times a week on average.    General Health:  - Sleep: sleeps well.  - Hearing: normal hearing bilateral ears.  - Vision: goes for regular eye exams.  - Dental: regular dental visits.    /GYN Health:  - Follows with GYN: yes.   - Menopause: perimenopausal.     Review of Systems   Constitutional:  Negative for unexpected weight change.   Respiratory:  Negative for shortness of breath.    Cardiovascular:  Negative for chest pain and palpitations.   Gastrointestinal:  Negative for abdominal pain, constipation and diarrhea.   Genitourinary:  Negative for difficulty urinating and menstrual problem.   Neurological:  Negative for dizziness and headaches.  "        Objective     /74 (BP Location: Left arm, Patient Position: Sitting, Cuff Size: Adult)   Pulse 80   Temp 97.5 °F (36.4 °C) (Probe)   Ht 5' 2.99\" (1.6 m)   Wt 72.7 kg (160 lb 3.2 oz)   SpO2 95%   BMI 28.39 kg/m²     Physical Exam  Constitutional:       General: She is not in acute distress.     Appearance: She is well-developed. She is not ill-appearing, toxic-appearing or diaphoretic.   HENT:      Right Ear: External ear normal. There is no impacted cerumen.      Left Ear: External ear normal. There is no impacted cerumen.   Eyes:      Conjunctiva/sclera: Conjunctivae normal.   Cardiovascular:      Rate and Rhythm: Normal rate and regular rhythm.      Heart sounds: Normal heart sounds. No murmur heard.  Pulmonary:      Effort: Pulmonary effort is normal. No respiratory distress.      Breath sounds: Normal breath sounds. No stridor. No wheezing or rales.   Abdominal:      General: There is no distension.      Palpations: Abdomen is soft. There is no mass.      Tenderness: There is no abdominal tenderness. There is no guarding or rebound.   Musculoskeletal:      Cervical back: Neck supple.      Right lower leg: No edema.      Left lower leg: No edema.   Neurological:      Mental Status: She is alert and oriented to person, place, and time.   Psychiatric:         Mood and Affect: Mood normal.         Behavior: Behavior normal.         Thought Content: Thought content normal.         Judgment: Judgment normal.         "

## 2024-11-12 NOTE — PATIENT INSTRUCTIONS
"Patient Education     Routine physical for adults   The Basics   Written by the doctors and editors at [facility]   What is a physical? -- A physical is a routine visit, or \"check-up,\" with your doctor. You might also hear it called a \"wellness visit\" or \"preventive visit.\"  During each visit, the doctor will:   Ask about your physical and mental health   Ask about your habits, behaviors, and lifestyle   Do an exam   Give you vaccines if needed   Talk to you about any medicines you take   Give advice about your health   Answer your questions  Getting regular check-ups is an important part of taking care of your health. It can help your doctor find and treat any problems you have. But it's also important for preventing health problems.  A routine physical is different from a \"sick visit.\" A sick visit is when you see a doctor because of a health concern or problem. Since physicals are scheduled ahead of time, you can think about what you want to ask the doctor.  How often should I get a physical? -- It depends on your age and health. In general, for people age 21 years and older:   If you are younger than 50 years, you might be able to get a physical every 3 years.   If you are 50 years or older, your doctor might recommend a physical every year.  If you have an ongoing health condition, like diabetes or high blood pressure, your doctor will probably want to see you more often.  What happens during a physical? -- In general, each visit will include:   Physical exam - The doctor or nurse will check your height, weight, heart rate, and blood pressure. They will also look at your eyes and ears. They will ask about how you are feeling and whether you have any symptoms that bother you.   Medicines - It's a good idea to bring a list of all the medicines you take to each doctor visit. Your doctor will talk to you about your medicines and answer any questions. Tell them if you are having any side effects that bother you. You " "should also tell them if you are having trouble paying for any of your medicines.   Habits and behaviors - This includes:   Your diet   Your exercise habits   Whether you smoke, drink alcohol, or use drugs   Whether you are sexually active   Whether you feel safe at home  Your doctor will talk to you about things you can do to improve your health and lower your risk of health problems. They will also offer help and support. For example, if you want to quit smoking, they can give you advice and might prescribe medicines. If you want to improve your diet or get more physical activity, they can help you with this, too.   Lab tests, if needed - The tests you get will depend on your age and situation. For example, your doctor might want to check your:   Cholesterol   Blood sugar   Iron level   Vaccines - The recommended vaccines will depend on your age, health, and what vaccines you already had. Vaccines are very important because they can prevent certain serious or deadly infections.   Discussion of screening - \"Screening\" means checking for diseases or other health problems before they cause symptoms. Your doctor can recommend screening based on your age, risk, and preferences. This might include tests to check for:   Cancer, such as breast, prostate, cervical, ovarian, colorectal, prostate, lung, or skin cancer   Sexually transmitted infections, such as chlamydia and gonorrhea   Mental health conditions like depression and anxiety  Your doctor will talk to you about the different types of screening tests. They can help you decide which screenings to have. They can also explain what the results might mean.   Answering questions - The physical is a good time to ask the doctor or nurse questions about your health. If needed, they can refer you to other doctors or specialists, too.  Adults older than 65 years often need other care, too. As you get older, your doctor will talk to you about:   How to prevent falling at " home   Hearing or vision tests   Memory testing   How to take your medicines safely   Making sure that you have the help and support you need at home  All topics are updated as new evidence becomes available and our peer review process is complete.  This topic retrieved from [employer] on: May 02, 2024.  Topic 110431 Version 1.0  Release: 32.4.3 - C32.122  © 2024 UpToDate, Inc. and/or its affiliates. All rights reserved.  Consumer Information Use and Disclaimer   Disclaimer: This generalized information is a limited summary of diagnosis, treatment, and/or medication information. It is not meant to be comprehensive and should be used as a tool to help the user understand and/or assess potential diagnostic and treatment options. It does NOT include all information about conditions, treatments, medications, side effects, or risks that may apply to a specific patient. It is not intended to be medical advice or a substitute for the medical advice, diagnosis, or treatment of a health care provider based on the health care provider's examination and assessment of a patient's specific and unique circumstances. Patients must speak with a health care provider for complete information about their health, medical questions, and treatment options, including any risks or benefits regarding use of medications. This information does not endorse any treatments or medications as safe, effective, or approved for treating a specific patient. UpToDate, Inc. and its affiliates disclaim any warranty or liability relating to this information or the use thereof.The use of this information is governed by the Terms of Use, available at https://www.woltersElyssafregoriuwer.com/en/know/clinical-effectiveness-terms. 2024© UpToDate, Inc. and its affiliates and/or licensors. All rights reserved.  Copyright   © 2024 UpToDate, Inc. and/or its affiliates. All rights reserved.

## 2024-11-12 NOTE — ASSESSMENT & PLAN NOTE
Blood pressure now low normal since she has lost 50 pounds on Zepbound  She may discontinue HCTZ and monitor her blood pressure  Resume HCTZ if her blood pressure is averaging greater than 120/80

## 2025-02-13 ENCOUNTER — HOSPITAL ENCOUNTER (OUTPATIENT)
Dept: RADIOLOGY | Age: 45
Discharge: HOME | End: 2025-02-13
Payer: COMMERCIAL

## 2025-02-13 VITALS — WEIGHT: 165 LBS | BODY MASS INDEX: 29.23 KG/M2 | HEIGHT: 63 IN

## 2025-02-13 DIAGNOSIS — E66.9 OBESITY (BMI 30-39.9): Primary | ICD-10-CM

## 2025-02-13 DIAGNOSIS — Z12.31 ENCOUNTER FOR SCREENING MAMMOGRAM FOR BREAST CANCER: ICD-10-CM

## 2025-02-13 PROCEDURE — 77067 SCR MAMMO BI INCL CAD: CPT

## 2025-02-13 PROCEDURE — 77063 BREAST TOMOSYNTHESIS BI: CPT

## 2025-02-14 ENCOUNTER — RESULTS FOLLOW-UP (OUTPATIENT)
Dept: FAMILY MEDICINE CLINIC | Facility: CLINIC | Age: 45
End: 2025-02-14

## 2025-02-14 DIAGNOSIS — E66.9 OBESITY (BMI 30-39.9): Primary | ICD-10-CM

## 2025-02-14 RX ORDER — TIRZEPATIDE 2.5 MG/.5ML
2.5 INJECTION, SOLUTION SUBCUTANEOUS WEEKLY
Qty: 2 ML | Refills: 0 | Status: SHIPPED | OUTPATIENT
Start: 2025-02-14

## 2025-02-14 RX ORDER — TIRZEPATIDE 5 MG/.5ML
5 INJECTION, SOLUTION SUBCUTANEOUS WEEKLY
Qty: 2 ML | Refills: 0 | Status: SHIPPED | OUTPATIENT
Start: 2025-03-14 | End: 2025-04-11

## 2025-02-14 RX ORDER — TIRZEPATIDE 10 MG/.5ML
10 INJECTION, SOLUTION SUBCUTANEOUS WEEKLY
Qty: 2 ML | Refills: 0 | Status: SHIPPED | OUTPATIENT
Start: 2025-05-09 | End: 2025-06-06

## 2025-02-14 RX ORDER — TIRZEPATIDE 5 MG/.5ML
5 INJECTION, SOLUTION SUBCUTANEOUS WEEKLY
Qty: 2 ML | Refills: 0 | Status: SHIPPED | OUTPATIENT
Start: 2025-03-14 | End: 2025-02-14

## 2025-02-14 RX ORDER — TIRZEPATIDE 2.5 MG/.5ML
2.5 INJECTION, SOLUTION SUBCUTANEOUS WEEKLY
Qty: 2 ML | Refills: 0 | Status: SHIPPED | OUTPATIENT
Start: 2025-02-14 | End: 2025-02-14

## 2025-02-14 RX ORDER — TIRZEPATIDE 7.5 MG/.5ML
7.5 INJECTION, SOLUTION SUBCUTANEOUS WEEKLY
Qty: 2 ML | Refills: 0 | Status: SHIPPED | OUTPATIENT
Start: 2025-04-11 | End: 2025-05-09

## 2025-02-14 RX ORDER — TIRZEPATIDE 10 MG/.5ML
10 INJECTION, SOLUTION SUBCUTANEOUS WEEKLY
Qty: 2 ML | Refills: 0 | Status: SHIPPED | OUTPATIENT
Start: 2025-05-09 | End: 2025-02-14

## 2025-02-14 RX ORDER — TIRZEPATIDE 7.5 MG/.5ML
7.5 INJECTION, SOLUTION SUBCUTANEOUS WEEKLY
Qty: 2 ML | Refills: 0 | Status: SHIPPED | OUTPATIENT
Start: 2025-04-11 | End: 2025-02-14

## 2025-02-28 DIAGNOSIS — E66.9 OBESITY (BMI 30-39.9): ICD-10-CM

## 2025-02-28 RX ORDER — TIRZEPATIDE 2.5 MG/.5ML
INJECTION, SOLUTION SUBCUTANEOUS
Qty: 2 ML | Refills: 0 | OUTPATIENT
Start: 2025-02-28

## 2025-03-14 ENCOUNTER — TELEPHONE (OUTPATIENT)
Age: 45
End: 2025-03-14

## 2025-03-14 NOTE — TELEPHONE ENCOUNTER
Pt cut her right ring finger this morning on some brook metal. It is not deep but it did bleed. She had a tetanus shot in 2022 and she wants to know if she will need another one. Please, advise.

## 2025-03-19 NOTE — PATIENT INSTRUCTIONS
Thank you for choosing us for your  care today  If you have any questions about your ultrasound or care, please do not hesitate to contact us or your primary obstetrician  Some general instructions for your pregnancy are:    Protect against coronavirus: get vaccinated - pregnant women are increased risk of severe COVID  Notify your primary care doctor if you have any symptoms  Exercise: Aim for 22 minutes per day (150 minutes per week) of regular exercise  Walking is great! Nutrition: aim for calcium-rich and iron-rich foods as well as healthy sources of protein  Learn about Preeclampsia: preeclampsia is a common, serious high blood pressure complication in pregnancy  A blood pressure of 867YBTT (systolic or top number) or 86KCRU (diastolic or bottom number) is not normal and needs evaluation by your doctor  Aspirin is sometimes prescribed in early pregnancy to prevent preeclampsia in women with risk factors - ask your obstetrician if you should be on this medication  If you smoke, try to reduce how many cigarettes you smoke or try to quit completely  Do not vape  Other warning signs to watch out for in pregnancy or postpartum: chest pain, obstructed breathing or shortness of breath, seizures, thoughts of hurting yourself or your baby, bleeding, a painful or swollen leg, fever, or headache (see [facility] POST-BIRTH Warning Signs campaign)  If these happen call 911  Itching is also not normal in pregnancy and if you experience this, especially over your hands and feet, potentially worse at night, notify your doctors  English

## 2025-03-20 DIAGNOSIS — E66.9 OBESITY (BMI 30-39.9): ICD-10-CM

## 2025-03-21 ENCOUNTER — NURSE TRIAGE (OUTPATIENT)
Age: 45
End: 2025-03-21

## 2025-03-21 DIAGNOSIS — N92.1 MENORRHAGIA WITH IRREGULAR CYCLE: ICD-10-CM

## 2025-03-21 DIAGNOSIS — N92.0 MENORRHAGIA WITH REGULAR CYCLE: Primary | ICD-10-CM

## 2025-03-21 RX ORDER — MEDROXYPROGESTERONE ACETATE 10 MG
10 TABLET ORAL DAILY
Qty: 10 TABLET | Refills: 0 | Status: SHIPPED | OUTPATIENT
Start: 2025-03-21

## 2025-03-21 RX ORDER — TIRZEPATIDE 5 MG/.5ML
INJECTION, SOLUTION SUBCUTANEOUS
Qty: 2 ML | Refills: 0 | OUTPATIENT
Start: 2025-03-21

## 2025-03-21 NOTE — TELEPHONE ENCOUNTER
Can give a course of Provera to stop her current episode of bleeding. Please let her know she will get a withdrawal bleed a few days after stopping the Provera course. Can also have her obtain up to date pelvic US. Please get her a follow up appointment with me

## 2025-03-21 NOTE — TELEPHONE ENCOUNTER
"FOLLOW UP: advised ED/UC. HP sent to provider and office clerical    REASON FOR CONVERSATION: Vaginal Bleeding    SYMPTOMS: period bleeding started today, using ultra tampons changing q 2 hours x 4. Mild intermittent cramping, denies feeling lightheaded or dizzy.     OTHER: had spotting several days before bleeding started.    DISPOSITION: Go to ED/[facility] Now (Or to Office with PCP Approval)  Reason for Disposition   SEVERE vaginal bleeding (e.g., soaking 2 pads or tampons per hour and present 2 or more hours; 1 menstrual cup every 2 hours)    Answer Assessment - Initial Assessment Questions  1. BLEEDING SEVERITY: \"Describe the bleeding that you are having.\" \"How much bleeding is there?\"       Patient had spotting for few days, bleeding started today using ultra tampon in 2 hours, notice clots in toilet larger than quarter.   2. ONSET: \"When did the bleeding begin?\" \"Is it continuing now?\"      Spotting this week   3. MENSTRUAL PERIOD: \"When was the last normal menstrual period?\" \"How is this different than your period?\"      LMP 2/18/25   4. REGULARITY: \"How regular are your periods?\"      Monthly   5. ABDOMEN PAIN: \"Do you have any pain?\" \"How bad is the pain?\"  (e.g., Scale 0-10; none, mild, moderate, or severe)      No   6. PREGNANCY: \"Is there any chance you are pregnant?\" \"When was your last menstrual period?\"      Had tubal   7. BREASTFEEDING: \"Are you breastfeeding?\"      no  8. HORMONE MEDICINES: \"Are you taking any hormone medicines, prescription or over-the-counter?\" (e.g., birth control pills, estrogen)       Weight loss drug   9. BLOOD THINNER MEDICINES: \"Do you take any blood thinners?\" (e.g., Coumadin / warfarin, Pradaxa / dabigatran, aspirin)      No   10. CAUSE: \"What do you think is causing the bleeding?\" (e.g., recent gyn surgery, recent gyn procedure; known bleeding disorder, cervical cancer, polycystic ovarian disease, fibroids)          Unsure   11. HEMODYNAMIC STATUS: \"Are you weak or feeling " "lightheaded?\" If Yes, ask: \"Can you stand and walk normally?\"         No   12. OTHER SYMPTOMS: \"What other symptoms are you having with the bleeding?\" (e.g., passed tissue, vaginal discharge, fever, menstrual-type cramps)        noreens    Protocols used: Vaginal Bleeding - Abnormal-Adult-[place]    "

## 2025-03-21 NOTE — TELEPHONE ENCOUNTER
Please call the pharmacy to check if they have the script for the next higher dose of Zepbound 7.5mg

## 2025-03-21 NOTE — TELEPHONE ENCOUNTER
Call made to patient and reviewed provider recommendations. Patient verbalized understanding. Follow-up appt scheduled.

## 2025-03-27 NOTE — TELEPHONE ENCOUNTER
Spoke with technician in Pharmacy. She pulled up info and put through system and patient will be notified when ready.

## 2025-04-02 ENCOUNTER — RA CDI HCC (OUTPATIENT)
Dept: OTHER | Facility: HOSPITAL | Age: 45
End: 2025-04-02

## 2025-04-02 NOTE — PROGRESS NOTES
HCC coding opportunities       Chart reviewed, no opportunity found: CHART REVIEWED, NO OPPORTUNITY FOUND   This is a reminder to address (resolve/update/assess) ALL HCC (risk adjustment) codes as found on active problem list for 2025 as patient scores reset to zero DIANNE.     Patients Insurance        Commercial Insurance: [employer] Commercial [employer]

## 2025-04-08 ENCOUNTER — OFFICE VISIT (OUTPATIENT)
Dept: INTERNAL MEDICINE CLINIC | Facility: CLINIC | Age: 45
End: 2025-04-08
Payer: COMMERCIAL

## 2025-04-08 VITALS
OXYGEN SATURATION: 100 % | RESPIRATION RATE: 16 BRPM | BODY MASS INDEX: 29.95 KG/M2 | WEIGHT: 169 LBS | SYSTOLIC BLOOD PRESSURE: 128 MMHG | HEART RATE: 81 BPM | HEIGHT: 63 IN | DIASTOLIC BLOOD PRESSURE: 78 MMHG

## 2025-04-08 DIAGNOSIS — E66.9 OBESITY (BMI 30-39.9): ICD-10-CM

## 2025-04-08 DIAGNOSIS — F43.22 ADJUSTMENT DISORDER WITH ANXIETY: Primary | ICD-10-CM

## 2025-04-08 PROCEDURE — 99213 OFFICE O/P EST LOW 20 MIN: CPT | Performed by: INTERNAL MEDICINE

## 2025-04-08 RX ORDER — ESCITALOPRAM OXALATE 5 MG/1
5 TABLET ORAL DAILY
Qty: 30 TABLET | Refills: 5 | Status: SHIPPED | OUTPATIENT
Start: 2025-04-08

## 2025-04-08 NOTE — PROGRESS NOTES
Name: Humbetro Lizarraga      : 1980      MRN: [id number]  Encounter Provider: Lea Reyes, MD  Encounter Date: 2025   Encounter department: [facility]    Assessment & Plan  Adjustment disorder with anxiety    Orders:  •  escitalopram (LEXAPRO) 5 mg tablet; Take 1 tablet (5 mg total) by mouth daily    Obesity (BMI 30-39.9)  Resumed Zepbound and about increased to 7.5 mg  Orders:  •  CBC and differential  •  Comprehensive metabolic panel  •  Hemoglobin A1C  •  Lipid panel  •  TSH, 3rd generation with Free T4 reflex         History of Present Illness     Here to discuss anxiety  Worsening symptoms in the past few months mainly from work-related issues  She is a teacher at [employer]  Driving to work for example is dreadful  Difficulty relaxing and focusing, irritability  She has tried Zoloft for depression which was during the pandemic and did not feel well on it.  She was then switched to fluoxetine which she took for a few years but felt flat so eventually discontinued  She has been doing well until the past few months      Review of Systems   Constitutional:  Negative for unexpected weight change.   Respiratory:  Negative for shortness of breath.    Cardiovascular:  Negative for chest pain and palpitations.   Gastrointestinal:  Negative for diarrhea.   Genitourinary:  Positive for menstrual problem (Very heavy menses, regular).   Neurological:  Negative for dizziness.   Psychiatric/Behavioral:  Positive for dysphoric mood. Negative for sleep disturbance. The patient is nervous/anxious.      Past Medical History:   Diagnosis Date   • ACL injury tear     Right knee   • Anxiety    • Asthma    • Depression    • Fibroid    • Maternal anemia, antepartum 2022   • Miscarriage 18   • Multigravida of advanced maternal age in third trimester 2022   • Pregnancy     resolved: 2015   • Uterine fibroid in pregnancy 2022   • Varicella      Past Surgical History:    Procedure Laterality Date   • ARTHROSCOPY KNEE     • BREAST SURGERY      reduction procedure   • IN  DELIVERY ONLY N/A 2022    Procedure:  SECTION ();  Surgeon: Annie Rodriguez MD;  Location: [facility];  Service: Obstetrics   • REDUCTION MAMMAPLASTY Bilateral    • TUBAL LIGATION Bilateral 2022    Procedure: LIGATION/COAGULATION TUBAL;  Surgeon: Annie Rodriguez MD;  Location: [facility] ;  Service: Obstetrics     Family History   Problem Relation Age of Onset   • No Known Problems Mother    • Heart attack Father    • Hypertension Father    • Pancreatic cancer Father 63   • Cancer Father         pancreatic   • Celiac disease Sister    • Other Sister         idopathic thrombocytopenic purpura   • Lupus Sister    • No Known Problems Daughter    • Lung cancer Maternal Grandmother    • Other Maternal Grandfather         renal failure   • Breast cancer Paternal Grandmother 60   • Breast cancer Paternal Aunt 50   • No Known Problems Son      Social History     Tobacco Use   • Smoking status: Never   • Smokeless tobacco: Never   Vaping Use   • Vaping status: Never Used   Substance and Sexual Activity   • Alcohol use: Not Currently     Comment: social   • Drug use: Not Currently     Types: Marijuana     Comment: medical marijuana- not in months   • Sexual activity: Yes     Partners: Male     Birth control/protection: None, Female Sterilization     Current Outpatient Medications on File Prior to Visit   Medication Sig   • albuterol (PROVENTIL HFA,VENTOLIN HFA) 90 mcg/act inhaler INHALE 2 PUFFS EVERY 6 HOURS AS NEEDED FOR SHORTNESS OF BREATH.   • azelastine (ASTELIN) 0.1 % nasal spray 1 spray into each nostril 2 (two) times a day Use in each nostril as directed   • ondansetron (ZOFRAN) 4 mg tablet Take 1 tablet (4 mg total) by mouth every 8 (eight) hours as needed for nausea or vomiting   • Restasis MultiDose 0.05 % ophthalmic emulsion Administer 1 drop to both eyes every 12  "(twelve) hours   • [START ON 4/11/2025] Zepbound 7.5 MG/0.5ML auto-injector Inject 0.5 mL (7.5 mg total) under the skin once a week for 28 days Do not start before April 11, 2025.   • medroxyPROGESTERone (PROVERA) 10 mg tablet Take 1 tablet (10 mg total) by mouth daily (Patient not taking: Reported on 4/8/2025)   • [START ON 5/9/2025] Zepbound 10 MG/0.5ML auto-injector Inject 0.5 mL (10 mg total) under the skin once a week for 28 days Do not start before May 9, 2025. (Patient not taking: Reported on 4/8/2025 Do not start before May 9, 2025.)   • Zepbound 5 MG/0.5ML auto-injector Inject 0.5 mL (5 mg total) under the skin once a week for 28 days Do not start before March 14, 2025. (Patient not taking: Reported on 4/8/2025)   • [DISCONTINUED] hydroCHLOROthiazide 25 mg tablet Take 1 tablet (25 mg total) by mouth daily (Patient not taking: Reported on 4/8/2025)   • [DISCONTINUED] Zepbound 2.5 MG/0.5ML auto-injector Inject 0.5 mL (2.5 mg total) under the skin once a week     Allergies   Allergen Reactions   • Amoxicillin Hives   • Other      Annotation - 13Mar2015: paprika  blackberries   • Penicillins Hives   • Sulfa Antibiotics Hives     Immunization History   Administered Date(s) Administered   • COVID-19 MODERNA VACC 0.5 ML IM 02/05/2021, 03/05/2021, 10/31/2021   • INFLUENZA 11/14/2011, 12/02/2014, 01/07/2020, 12/15/2021, 01/18/2022   • Influenza, injectable, quadrivalent, preservative free 0.5 mL 11/06/2020, 11/03/2022, 10/16/2023   • Influenza, seasonal, injectable, preservative free 11/12/2024   • Pneumococcal Polysaccharide PPV23 09/28/2015   • Tdap 08/18/2020, 09/27/2022     Objective   /78   Pulse 81   Resp 16   Ht 5' 3\" (1.6 m)   Wt 76.7 kg (169 lb)   SpO2 100%   BMI 29.94 kg/m²     Physical Exam  Constitutional:       General: She is not in acute distress.     Appearance: She is well-developed. She is not ill-appearing, toxic-appearing or diaphoretic.   Eyes:      Conjunctiva/sclera: Conjunctivae " normal.   Cardiovascular:      Rate and Rhythm: Normal rate and regular rhythm.      Heart sounds: Normal heart sounds. No murmur heard.  Pulmonary:      Effort: Pulmonary effort is normal. No respiratory distress.      Breath sounds: Normal breath sounds. No stridor. No wheezing or rales.   Abdominal:      General: There is no distension.      Palpations: Abdomen is soft. There is no mass.      Tenderness: There is no abdominal tenderness. There is no guarding or rebound.   Musculoskeletal:      Cervical back: Neck supple.   Neurological:      Mental Status: She is alert and oriented to person, place, and time.   Psychiatric:         Mood and Affect: Mood is depressed.         Behavior: Behavior normal.         Thought Content: Thought content normal.         Judgment: Judgment normal.

## 2025-04-08 NOTE — ASSESSMENT & PLAN NOTE
Resumed Zepbound and about increased to 7.5 mg  Orders:  •  CBC and differential  •  Comprehensive metabolic panel  •  Hemoglobin A1C  •  Lipid panel  •  TSH, 3rd generation with Free T4 reflex

## 2025-04-09 ENCOUNTER — HOSPITAL ENCOUNTER (OUTPATIENT)
Dept: RADIOLOGY | Age: 45
Discharge: HOME | End: 2025-04-09
Payer: COMMERCIAL

## 2025-04-09 DIAGNOSIS — N92.1 MENORRHAGIA WITH IRREGULAR CYCLE: ICD-10-CM

## 2025-04-09 PROCEDURE — 76856 US EXAM PELVIC COMPLETE: CPT

## 2025-04-09 PROCEDURE — 76830 TRANSVAGINAL US NON-OB: CPT

## 2025-04-11 DIAGNOSIS — E66.9 OBESITY (BMI 30-39.9): ICD-10-CM

## 2025-04-11 RX ORDER — TIRZEPATIDE 7.5 MG/.5ML
7.5 INJECTION, SOLUTION SUBCUTANEOUS WEEKLY
Qty: 2 ML | Refills: 0 | OUTPATIENT
Start: 2025-04-11

## 2025-04-16 ENCOUNTER — OFFICE VISIT (OUTPATIENT)
Dept: OBGYN CLINIC | Facility: CLINIC | Age: 45
End: 2025-04-16
Payer: COMMERCIAL

## 2025-04-16 VITALS
BODY MASS INDEX: 29.77 KG/M2 | DIASTOLIC BLOOD PRESSURE: 72 MMHG | WEIGHT: 168 LBS | HEIGHT: 63 IN | SYSTOLIC BLOOD PRESSURE: 108 MMHG

## 2025-04-16 DIAGNOSIS — N92.1 MENORRHAGIA WITH IRREGULAR CYCLE: Primary | ICD-10-CM

## 2025-04-16 PROCEDURE — 88305 TISSUE EXAM BY PATHOLOGIST: CPT | Performed by: STUDENT IN AN ORGANIZED HEALTH CARE EDUCATION/TRAINING PROGRAM

## 2025-04-16 PROCEDURE — 58100 BIOPSY OF UTERUS LINING: CPT | Performed by: PHYSICIAN ASSISTANT

## 2025-04-16 PROCEDURE — 99213 OFFICE O/P EST LOW 20 MIN: CPT | Performed by: PHYSICIAN ASSISTANT

## 2025-04-16 NOTE — PROGRESS NOTES
Endometrial biopsy    Date/Time: 4/16/2025 7:45 AM    Performed by: Rocío Galicia PA-C  Authorized by: Rocío Galicia PA-C  Universal Protocol:  Consent: Verbal consent obtained.  Risks and benefits: risks, benefits and alternatives were discussed  Consent given by: patient  Patient understanding: patient states understanding of the procedure being performed  Patient consent: the patient's understanding of the procedure matches consent given  Procedure consent: procedure consent matches procedure scheduled  Relevant documents: relevant documents present and verified    Indication:     Indications: Other disorder of menstruation and other abnormal bleeding from female genital tract      Indications comment:  Menorrhagia  Procedure:     Procedure: endometrial biopsy with Pipelle      A bivalve speculum was placed in the vagina: yes      Cervix cleaned and prepped: yes      The cervix was dilated: no      Uterus sounded: yes      Uterus sound depth (cm):  9.5    Specimen collected: specimen collected and sent to pathology      Patient tolerated procedure well with no complications: yes    Comments:     Procedure comments:  Cervix cleaned and prepped with betadine. Tenaculum placed on anterior lip. Uterus sounded to 9.5 cm. Pipelle utilized to obtain tissue sample x 3. Significant blood/clot noted with each pass. Will await pathology

## 2025-04-16 NOTE — PROGRESS NOTES
Name: Humberto Lizarraga      : 1980      MRN: [id number]  Encounter Provider: Rocío Galicia PA-C  Encounter Date: 2025   Encounter department: [facility] [place]  :  Assessment & Plan  Menorrhagia with irregular cycle  - See discussion below  -Patient will take some time to consider options  -Endometrial sampling performed today.  See separate procedure note.  Await results           History of Present Illness   HPI  Humberto Lizarraga is a 44 y.o. female who presents to the office today for a problem visit.  Over the past year her menstrual cycles have changed significantly.  Menses have always been on the heavier side.  Menses are previously coming every 23 to 25 days and lasting about 5 days.  Lately menstrual cycles are occurring every 19 to 26 days.  Duration is slightly longer.  Menses are becoming increasingly more heavy.    She reports 3 days of significantly heavy flow in which she needs to change tampon pad every 1-2 hours.  She will saturate through a pad and pad if not changed.  Large blood clots passed.    She has been on pills in the past and did well on OCPs.  She also used Mirena IUD in the past.  This IUD did cause some issues-reports worsening mood/cramping with IUD in place.  She has had tubal also does not need contraception.    We briefly reviewed management options occluding restarting OCPs, patch, ring versus IUD.  Briefly reviewed Nexplanon/Depo. We also briefly reviewed surgical options.     We then reviewed the benefits of endometrial sampling given her age and bleeding pattern.  Reviewed the importance of endometrial biopsy to rule out hyperplasia/carcinoma.  Offered sampling in office today which she is agreeable to.    Denies hx of HTN, migraine with aura, personal/fhx clot/clotting disorder, smoking, liver/gallbladder dz        Review of Systems   Constitutional: Negative.    Respiratory: Negative.     Cardiovascular: Negative.   "  Gastrointestinal: Negative.    Genitourinary:  Positive for menstrual problem.   Musculoskeletal: Negative.    Skin: Negative.    Psychiatric/Behavioral: Negative.            Objective   /72 (BP Location: Left arm, Patient Position: Sitting, Cuff Size: Standard)   Ht 5' 3\" (1.6 m)   Wt 76.2 kg (168 lb)   LMP 04/12/2025   BMI 29.76 kg/m²      Physical Exam  Vitals and nursing note reviewed.   Constitutional:       General: She is not in acute distress.     Appearance: Normal appearance. She is well-developed.   HENT:      Head: Normocephalic and atraumatic.   Eyes:      Conjunctiva/sclera: Conjunctivae normal.   Pulmonary:      Effort: Pulmonary effort is normal. No respiratory distress.   Abdominal:      General: Abdomen is flat. There is no distension.      Hernia: There is no hernia in the left inguinal area or right inguinal area.   Genitourinary:     General: Normal vulva.      Exam position: Lithotomy position.      Labia:         Right: No rash, tenderness or lesion.         Left: No rash, tenderness or lesion.       Vagina: No vaginal discharge, bleeding or prolapsed vaginal walls.      Cervix: No cervical motion tenderness, discharge, lesion or cervical bleeding.      Uterus: Not enlarged and not tender.       Adnexa:         Right: No mass, tenderness or fullness.          Left: No mass, tenderness or fullness.     Musculoskeletal:      Cervical back: Neck supple.      Right lower leg: No edema.      Left lower leg: No edema.   Lymphadenopathy:      Lower Body: No right inguinal adenopathy. No left inguinal adenopathy.   Skin:     General: Skin is warm and dry.   Neurological:      General: No focal deficit present.      Mental Status: She is alert. Mental status is at baseline.   Psychiatric:         Mood and Affect: Mood normal.         Behavior: Behavior normal.         Thought Content: Thought content normal.         Judgment: Judgment normal.           "

## 2025-04-23 ENCOUNTER — RESULTS FOLLOW-UP (OUTPATIENT)
Dept: OBGYN CLINIC | Facility: CLINIC | Age: 45
End: 2025-04-23

## 2025-04-23 DIAGNOSIS — E66.9 OBESITY (BMI 30-39.9): ICD-10-CM

## 2025-04-23 PROCEDURE — 88305 TISSUE EXAM BY PATHOLOGIST: CPT | Performed by: STUDENT IN AN ORGANIZED HEALTH CARE EDUCATION/TRAINING PROGRAM

## 2025-04-24 RX ORDER — TIRZEPATIDE 7.5 MG/.5ML
7.5 INJECTION, SOLUTION SUBCUTANEOUS WEEKLY
Qty: 2 ML | Refills: 0 | OUTPATIENT
Start: 2025-04-24

## 2025-04-25 DIAGNOSIS — N92.1 MENORRHAGIA WITH IRREGULAR CYCLE: Primary | ICD-10-CM

## 2025-04-25 RX ORDER — NORETHINDRONE ACETATE AND ETHINYL ESTRADIOL .02; 1 MG/1; MG/1
1 TABLET ORAL DAILY
Qty: 84 TABLET | Refills: 1 | Status: SHIPPED | OUTPATIENT
Start: 2025-04-25

## 2025-05-07 DIAGNOSIS — E66.9 OBESITY (BMI 30-39.9): Primary | ICD-10-CM

## 2025-05-07 RX ORDER — TIRZEPATIDE 12.5 MG/.5ML
12.5 INJECTION, SOLUTION SUBCUTANEOUS WEEKLY
Qty: 2 ML | Refills: 0 | Status: SHIPPED | OUTPATIENT
Start: 2025-05-07

## 2025-05-07 RX ORDER — TIRZEPATIDE 15 MG/.5ML
15 INJECTION, SOLUTION SUBCUTANEOUS WEEKLY
Qty: 2 ML | Refills: 5 | Status: SHIPPED | OUTPATIENT
Start: 2025-05-07

## 2025-06-25 ENCOUNTER — TELEMEDICINE (OUTPATIENT)
Dept: OTHER | Facility: HOSPITAL | Age: 45
End: 2025-06-25

## 2025-06-25 DIAGNOSIS — L30.9 DERMATITIS: Primary | ICD-10-CM

## 2025-06-25 PROCEDURE — ECARE PR SL URGENT CARE VIRTUAL VISIT: Performed by: NURSE PRACTITIONER

## 2025-06-25 RX ORDER — TRIAMCINOLONE ACETONIDE 1 MG/G
CREAM TOPICAL 2 TIMES DAILY
Qty: 45 G | Refills: 0 | Status: SHIPPED | OUTPATIENT
Start: 2025-06-25 | End: 2025-07-02

## 2025-06-25 RX ORDER — METHYLPREDNISOLONE 4 MG/1
TABLET ORAL
Qty: 21 TABLET | Refills: 0 | Status: SHIPPED | OUTPATIENT
Start: 2025-06-25

## 2025-06-25 NOTE — PROGRESS NOTES
Virtual Regular Visit  Name: Humberto Lizarraga      : 1980      MRN: [id number]  Encounter Provider: KEEGAN Starks  Encounter Date: 2025   Encounter department: VIRTUAL CARE       Verification of patient location:  Patient is located at Home in the following state in which I hold an active license [place] :  Assessment & Plan  Dermatitis    Orders:    triamcinolone (KENALOG) 0.1 % cream; Apply topically 2 (two) times a day for 7 days    methylprednisolone (MEDROL) 4 mg tablet; Medrol dose pack Take as directed.        Encounter provider KEEGAN Starks    The patient was identified by name and date of birth. Humberto Lizarraga was informed that this is a telemedicine visit and that the visit is being conducted through the Epic Embedded platform. She agrees to proceed..  My office door was closed. No one else was in the room. She acknowledged consent and understanding of privacy and security of the video platform. The patient has agreed to participate and understands they can discontinue the visit at any time.    Patient is aware this is a billable service.     History obtained from: patient  History of Present Illness     This is a 44 year old female here today for vide ovisit.  She states she has rash from her wedding ring.  She states this has been for about 2 weeks.  She states she tried hydrocortisone cream which has not helped.  She states the rash is itchy.  She states this did not happen when she first had her wedding band.       Review of Systems   Constitutional: Negative.    Respiratory: Negative.     Cardiovascular: Negative.    Skin:  Positive for rash.   Psychiatric/Behavioral: Negative.         Objective   There were no vitals taken for this visit.    Physical Exam  Constitutional:       General: She is not in acute distress.     Appearance: Normal appearance. She is not ill-appearing or toxic-appearing.     Skin:     Comments: Left ring finger: erythemic vesicular rash      Neurological:      Mental Status: She is alert and oriented to person, place, and time.     Psychiatric:         Mood and Affect: Mood normal.         Behavior: Behavior normal.         Thought Content: Thought content normal.         Judgment: Judgment normal.         Visit Time  Total Visit Duration: 6 minutes not including the time spent for establishing the audio/video connection.

## 2025-06-25 NOTE — PATIENT INSTRUCTIONS
Will start kenalog cream to see if this helps.  If no improvement you start medrol dose pack.  You could consider having you ring rhodium plated.  You could also consider wearing a rubber wedding band.  Follow up with PCP if no improvement.  Go to ER with any worsening symptoms.

## 2025-07-05 ENCOUNTER — TELEMEDICINE (OUTPATIENT)
Dept: OTHER | Facility: HOSPITAL | Age: 45
End: 2025-07-05

## 2025-07-05 DIAGNOSIS — R39.89 SUSPECTED UTI: Primary | ICD-10-CM

## 2025-07-05 PROCEDURE — ECARE PR SL URGENT CARE VIRTUAL VISIT: Performed by: PHYSICIAN ASSISTANT

## 2025-07-05 RX ORDER — NITROFURANTOIN 25; 75 MG/1; MG/1
100 CAPSULE ORAL 2 TIMES DAILY
Qty: 10 CAPSULE | Refills: 0 | Status: SHIPPED | OUTPATIENT
Start: 2025-07-05 | End: 2025-07-10

## 2025-07-05 NOTE — PROGRESS NOTES
Virtual Regular Visit  Name: Humberto Lizarraga      : 1980      MRN: [id number]  Encounter Provider: Your Video Visit Provider  Encounter Date: 2025   Encounter department: VIRTUAL CARE       Verification of patient location:  Patient is located at Home in the following state in which I [place] an [place] license [place] :  Assessment & Plan  Suspected UTI    Orders:    UA w Reflex to Microscopic w Reflex to Culture; Future    nitrofurantoin (MACROBID) 100 mg capsule; Take 1 capsule (100 mg total) by mouth 2 (two) times a day for 5 days        Encounter provider Your Video Visit Provider    The patient was identified by name and date of birth. Humberto Lizarraga was informed that this is a telemedicine visit and that the visit is being conducted through the Epic Embedded platform. She agrees to proceed..  My office door was closed. No one else was in the room. She acknowledged consent and understanding of privacy and security of the video platform. The patient has agreed to participate and understands they can discontinue the visit at any time.    Patient is aware this is a billable service.     History obtained from: patient  History of Present Illness     44 y.o. female presents for evaluation of painful urination, strong odor, increased frequency and urgency. Denies blood in urine, fever, chills, N/V/D, SOB, CP, HA, dizziness, fainting, back or abdominal pain      Review of Systems   Constitutional:  Negative for chills and fever.   Genitourinary:  Positive for dysuria, frequency and urgency. Negative for flank pain and hematuria.   All other systems reviewed and are negative.      Objective   There were no vitals taken for this visit.    Physical Exam  Constitutional:       General: She is not in acute distress.     Appearance: Normal appearance. She is not ill-appearing or toxic-appearing.   HENT:      Head: Normocephalic and atraumatic.      Right Ear: External ear normal.      Left Ear: External ear  Writer received call back from Dr. Summers about family being concerned about patient going home after the paracentesis and port placement done today.  Dr. Summers said that there is no reason for patient to stay over at this point and it is okay to discharge home.   normal.      Nose: Nose normal.     Eyes:      Extraocular Movements: Extraocular movements intact.      Conjunctiva/sclera: Conjunctivae normal.     Pulmonary:      Effort: Pulmonary effort is normal. No respiratory distress.      Breath sounds: No stridor. No wheezing.     Neurological:      Mental Status: She is alert and oriented to person, place, and time. Mental status is at baseline.     Psychiatric:         Mood and Affect: Mood normal.         Behavior: Behavior normal.         Thought Content: Thought content normal.         Judgment: Judgment normal.         Visit Time  Total Visit Duration: 8 minutes not including the time spent for establishing the audio/video connection.

## 2025-07-06 ENCOUNTER — APPOINTMENT (OUTPATIENT)
Dept: LAB | Facility: CLINIC | Age: 45
End: 2025-07-06
Payer: COMMERCIAL

## 2025-07-06 DIAGNOSIS — R39.89 SUSPECTED UTI: ICD-10-CM

## 2025-07-06 LAB
ALBUMIN SERPL BCG-MCNC: 4.4 G/DL (ref 3.5–5)
ALP SERPL-CCNC: 35 U/L (ref 34–104)
ALT SERPL W P-5'-P-CCNC: 12 U/L (ref 7–52)
ANION GAP SERPL CALCULATED.3IONS-SCNC: 3 MMOL/L (ref 4–13)
AST SERPL W P-5'-P-CCNC: 13 U/L (ref 13–39)
BASOPHILS # BLD AUTO: 0.03 THOUSANDS/ÂΜL (ref 0–0.1)
BASOPHILS NFR BLD AUTO: 1 % (ref 0–1)
BILIRUB SERPL-MCNC: 0.47 MG/DL (ref 0.2–1)
BILIRUB UR QL STRIP: NEGATIVE
BUN SERPL-MCNC: 14 MG/DL (ref 5–25)
CALCIUM SERPL-MCNC: 9 MG/DL (ref 8.4–10.2)
CHLORIDE SERPL-SCNC: 106 MMOL/L (ref 96–108)
CHOLEST SERPL-MCNC: 212 MG/DL (ref ?–200)
CLARITY UR: CLEAR
CO2 SERPL-SCNC: 28 MMOL/L (ref 21–32)
COLOR UR: NORMAL
CREAT SERPL-MCNC: 0.64 MG/DL (ref 0.6–1.3)
EOSINOPHIL # BLD AUTO: 0.1 THOUSAND/ÂΜL (ref 0–0.61)
EOSINOPHIL NFR BLD AUTO: 2 % (ref 0–6)
ERYTHROCYTE [DISTWIDTH] IN BLOOD BY AUTOMATED COUNT: 13.4 % (ref 11.6–15.1)
EST. AVERAGE GLUCOSE BLD GHB EST-MCNC: 111 MG/DL
GFR SERPL CREATININE-BSD FRML MDRD: 107 ML/MIN/1.73SQ M
GLUCOSE P FAST SERPL-MCNC: 86 MG/DL (ref 65–99)
GLUCOSE UR STRIP-MCNC: NEGATIVE MG/DL
HBA1C MFR BLD: 5.5 %
HCT VFR BLD AUTO: 38.2 % (ref 34.8–46.1)
HDLC SERPL-MCNC: 46 MG/DL
HGB BLD-MCNC: 13.3 G/DL (ref 11.5–15.4)
HGB UR QL STRIP.AUTO: NEGATIVE
IMM GRANULOCYTES # BLD AUTO: 0.01 THOUSAND/UL (ref 0–0.2)
IMM GRANULOCYTES NFR BLD AUTO: 0 % (ref 0–2)
KETONES UR STRIP-MCNC: NEGATIVE MG/DL
LDLC SERPL CALC-MCNC: 140 MG/DL (ref 0–100)
LEUKOCYTE ESTERASE UR QL STRIP: NEGATIVE
LYMPHOCYTES # BLD AUTO: 2.47 THOUSANDS/ÂΜL (ref 0.6–4.47)
LYMPHOCYTES NFR BLD AUTO: 46 % (ref 14–44)
MCH RBC QN AUTO: 29.3 PG (ref 26.8–34.3)
MCHC RBC AUTO-ENTMCNC: 34.8 G/DL (ref 31.4–37.4)
MCV RBC AUTO: 84 FL (ref 82–98)
MONOCYTES # BLD AUTO: 0.33 THOUSAND/ÂΜL (ref 0.17–1.22)
MONOCYTES NFR BLD AUTO: 6 % (ref 4–12)
NEUTROPHILS # BLD AUTO: 2.41 THOUSANDS/ÂΜL (ref 1.85–7.62)
NEUTS SEG NFR BLD AUTO: 45 % (ref 43–75)
NITRITE UR QL STRIP: NEGATIVE
NONHDLC SERPL-MCNC: 166 MG/DL
NRBC BLD AUTO-RTO: 0 /100 WBCS
PH UR STRIP.AUTO: 6.5 [PH]
PLATELET # BLD AUTO: 349 THOUSANDS/UL (ref 149–390)
PMV BLD AUTO: 9.4 FL (ref 8.9–12.7)
POTASSIUM SERPL-SCNC: 4.2 MMOL/L (ref 3.5–5.3)
PROT SERPL-MCNC: 6.9 G/DL (ref 6.4–8.4)
PROT UR STRIP-MCNC: NEGATIVE MG/DL
RBC # BLD AUTO: 4.54 MILLION/UL (ref 3.81–5.12)
SODIUM SERPL-SCNC: 137 MMOL/L (ref 135–147)
SP GR UR STRIP.AUTO: 1.01 (ref 1–1.03)
TRIGL SERPL-MCNC: 129 MG/DL (ref ?–150)
TSH SERPL DL<=0.05 MIU/L-ACNC: 1.45 UIU/ML (ref 0.45–4.5)
UROBILINOGEN UR STRIP-ACNC: <2 MG/DL
WBC # BLD AUTO: 5.35 THOUSAND/UL (ref 4.31–10.16)

## 2025-07-06 PROCEDURE — 81003 URINALYSIS AUTO W/O SCOPE: CPT

## 2025-07-09 ENCOUNTER — CLINICAL SUPPORT (OUTPATIENT)
Dept: BARIATRICS | Facility: CLINIC | Age: 45
End: 2025-07-09

## 2025-07-09 VITALS — HEIGHT: 63 IN | BODY MASS INDEX: 29.77 KG/M2 | WEIGHT: 168 LBS

## 2025-07-09 DIAGNOSIS — R63.5 ABNORMAL WEIGHT GAIN: Primary | ICD-10-CM

## 2025-07-09 PROCEDURE — RECHECK

## 2025-07-09 PROCEDURE — WMDI30

## 2025-07-09 NOTE — PROGRESS NOTES
Weight Management Medical Nutrition Assessment    Humberto presented for a meal planning session. Today's weight is 168lb. Has been taking Zepbound 1.5 years ago and is currently at 12.5mg. Weight started at 220#. Biggest struggle in the beginning was getting enough fuel in at that time. Hunger and food noise and began to increase at this time. Sluggish, tired, and nausea. PCP is prescribing. We established a calorie goal today with small reductions in portion size and, swapping out products.     For example, she can save 200 calories daily by swapping to light peres; 647 bread reduces calories of bread per slice by 60 calories (saving 120 total)  In addition, we want to also make sure there is enough protein in the AM, so to increase breakfast with an additional protein is ideal.     Developed and reviewed a low calorie meal plan     Nutrition Prescription  Calories:7710-9341  Protein:85g   Fluid:70 fl oz    Meal Plan (Jh/Pro/Carb)  Breakfast: 300 jh/20-30gPRO  Snack: 100-150 jh/10g PRO  Lunch: 400 jh/20-30g PRO  Snack: 100-150 jh/10g PRO  Dinner: 500 jh/25g PRO  Snack: 125 jh/10g PRO      Patient seen by Medical Provider in past 6 months:  no  Requested to schedule appointment with Medical Provider: No      Anthropometric Measurements  Start Weight (#) & Date: 168lb 7/9/25  Current Weight (#): 168lb  TBW % Change from start weight:-  Ideal Body Weight (#):115  Goal Weight (#):-      Weight Loss History  Previous weight loss attempts: Exercise  Nutrition Counseling with RD  Self Created Diets (Portion Control, Healthy Food Choices, etc.)    Food and Nutrition Related History  Wake up: -   Bed Time:-    Food Recall  Breakfast:Protein Shake ([employer]) but now at home egg whites with 1 egg and fruit - water     Snack:[place] or cottage cheese, might have some pretzels   Lunch:tuna with peres and pickles and celery with pretzels or tortilla chips (will handful), water (Liquid IV at times); tuna whole can    Snack:Fruit or might have snack bag of pretzels or Cheetos bag   Dinner:Pasta, chicken and a vegetable - ground turkey, hamburger, broccoli, edamame, mixed greens, mixed vegetables, rice; rice and pasta are the same portions; 3oz   Snack:Yogurt with SF pudding mix or cool whip or berries       Beverages: water  Volume of beverage intake: 64    Weekends: Same  Cravings: chocolate - stress trigger of chocolate - might come after lunch    And cheese - cheese stick (mozzarella)   Trouble area of day:Mid afternoon is typically when hunger strikes     Frequency of Eating out: biweekly  Food restrictions:-  Cooking: self   Food Shopping: self    Physical Activity Intake  Activity:Stretching and yoga, lifting 3x per week   Frequency:several times per week  Physical limitations/barriers to exercise: no limitations     Estimated Needs  Energy  Sonia Dumont Energy Needs: BMR : 1371   1-2# loss weekly sedentary:  8250-6017     1-2# loss weekly lightly active:0153-1675  Maintenance calories for sedentary activity level: 1645  Protein:66-83      (1.2-1.5g/kg IBW)  Fluid Requirement Calculator   Total Fluid: 88   oz  ([place]-Segar Method)  Free Fluid: 70 oz ([place]-Segar Method - 20%)    Nutrition Diagnosis  Yes;    Excessive energy intake  related to Food and nutrition related knowledge deficit concerning energy intake as evidenced by  BMI >25 for adults       Nutrition Intervention    Nutrition Prescription  Calories:9223-0857  Protein:85g   Fluid:70 fl oz    Meal Plan (Jh/Pro/Carb)  Breakfast: 300 jh/20-30gPRO  Snack: 100-150 jh/10g PRO  Lunch: 400 jh/20-30g PRO  Snack: 100-150 jh/10g PRO  Dinner: 500 jh/25g PRO  Snack: 125 jh/10g PRO    Nutrition Education:    Calorie controlled menu  Lean protein food choices  Healthy snack options  Food journaling tips      Nutrition Counseling:  Strategies: meal planning, portion sizes, healthy snack choices, hydration, fiber intake, protein intake, exercise, food  journal      Monitoring and Evaluation:  Evaluation criteria:  Energy Intake  Meet protein needs  Maintain adequate hydration  Monitor weekly weight  Meal planning/preparation  Food journal   Decreased portions at mealtimes and snacks  Physical activity     Barriers to learning:none  Readiness to change: Action:  (Changing behavior)  Comprehension: very good  Expected Compliance: very good

## 2025-07-18 ENCOUNTER — OFFICE VISIT (OUTPATIENT)
Dept: OBGYN CLINIC | Facility: CLINIC | Age: 45
End: 2025-07-18
Payer: COMMERCIAL

## 2025-07-18 VITALS
RESPIRATION RATE: 16 BRPM | DIASTOLIC BLOOD PRESSURE: 68 MMHG | SYSTOLIC BLOOD PRESSURE: 112 MMHG | HEIGHT: 63 IN | WEIGHT: 166.2 LBS | BODY MASS INDEX: 29.45 KG/M2

## 2025-07-18 DIAGNOSIS — Z12.39 BREAST CANCER SCREENING OTHER THAN MAMMOGRAM: ICD-10-CM

## 2025-07-18 DIAGNOSIS — Z01.419 WELL WOMAN EXAM WITH ROUTINE GYNECOLOGICAL EXAM: Primary | ICD-10-CM

## 2025-07-18 DIAGNOSIS — Z12.39 BREAST CANCER SCREENING, HIGH RISK PATIENT: ICD-10-CM

## 2025-07-18 DIAGNOSIS — Z80.3 FAMILY HISTORY OF BREAST CANCER: ICD-10-CM

## 2025-07-18 PROCEDURE — S0612 ANNUAL GYNECOLOGICAL EXAMINA: HCPCS | Performed by: PHYSICIAN ASSISTANT

## 2025-07-18 NOTE — PROGRESS NOTES
Name: Humberto Lizarraga      : 1980      MRN: [id number]  Encounter Provider: Rocío Galicia PA-C  Encounter Date: 2025   Encounter department: [facility] [place]  :  Assessment & Plan  Well woman exam with routine gynecological exam  Pap up to date    Mammo slip given. Discussed screening with MRI given elevated life time risk of 20% which she will consider and discuss with insurance. Referral placed for genetics consult as well   Colonoscopy up to date    Perineal hygiene reviewed. Weight bearing exercises minium of 150 mins/weekly advised. Kegel exercises recommended.   SBE encouraged, A yearly mammogram is recommended for breast cancer screening starting at age 40. ASCCP guidelines reviewed. Condoms encouraged with all sexual activity to prevent STI's. Gardisil vaccines recommended up to age 45. Calcium/ Vit D dietary requirements discussed.   Advised to call with any issues, all concerns & questions addressed.   See provided information in your after visit summary     F/U Annually and PRN    Results will be released to [employer], if abnormal will call or message to review and discuss treatment plan.        Family history of breast cancer    Orders:    Ambulatory Referral to Genetics; Future    MRI breast bilateral w and wo contrast w cad; Future    Breast cancer screening other than mammogram    Orders:    MRI breast bilateral w and wo contrast w cad; Future    Breast cancer screening, high risk patient    Orders:    MRI breast bilateral w and wo contrast w cad; Future        History of Present Illness   HPI    Subjective     Humberto Lizarraga is a 44 y.o.  presenting for annual exam. She is without complaint     SCREENING  Last Pap: 2024 neg/neg  Last Mammo: 2025 birads 2; lifetime risk 20%   Last Colonoscopy: 07/15/2021 5 year recall      GYN  Periods are regular every 25 days, lasting 7 days.    Sexually active: Yes - single partner -  male  Contraception: tubal      Hx Abnormal pap: denies  We reviewed ASCCP guidelines for Pap testing today.    Denies vaginal discharge, itching, odor, dyspareunia, pelvic pain and vulvar/vaginal symptoms      OB           Complaints: denies presently; had some dysuria and irritation prior to last menses; negative cultures. Discussed lifestyle modifications   Denies urgency, frequency, hematuria, leakage / change in stream, difficulty urinating.       BREAST  Complaints: denies   Denies: breast lump, breast tenderness, nipple discharge, skin color change, and skin lesion(s)  Personal hx: hx of breast reduction      Pertinent Family Hx:   Family hx of breast cancer: PGM, paternal aunt   Family hx of ovarian cancer: no  Family hx of colon cancer: no    Dad with hx of pancreatic cancer     GENERAL  PMH reviewed/updated and is as below.   Patient does follow with a PCP.    SOCIAL  Smoking: no  Alcohol:social  Drug: no      Past Medical History[1]    Past Surgical History[2]    Current Medications[3]    Allergies[4]    Social History     Socioeconomic History    Marital status: /Civil Union     Spouse name: Not on file    Number of children: Not on file    Years of education: Not on file    Highest education level: Not on file   Occupational History    Not on file   Tobacco Use    Smoking status: Never    Smokeless tobacco: Never   Vaping Use    Vaping status: Never Used   Substance and Sexual Activity    Alcohol use: Not Currently     Comment: social    Drug use: Not Currently     Types: Marijuana     Comment: medical marijuana- not in months    Sexual activity: Yes     Partners: Male     Birth control/protection: None   Other Topics Concern    Not on file   Social History Narrative    Always uses seat belt    Exercise habits      Social Drivers of Health     Financial Resource Strain: Not on file   Food Insecurity: Not on file   Transportation Needs: Not on file   Physical Activity: Not on file  "  Stress: Not on file   Social Connections: Not on file   Intimate Partner Violence: Not on file   Housing Stability: Not on file         Review of Systems   Constitutional: Negative.    Respiratory: Negative.     Cardiovascular: Negative.    Gastrointestinal: Negative.    Genitourinary: Negative.    Musculoskeletal: Negative.    Skin: Negative.    Psychiatric/Behavioral: Negative.            Objective   /68 (BP Location: Left arm, Patient Position: Sitting, Cuff Size: Standard)   Resp 16   Ht 5' 3\" (1.6 m)   Wt 75.4 kg (166 lb 3.2 oz)   LMP 06/27/2025 (Exact Date)   BMI 29.44 kg/m²      Physical Exam  Constitutional:       Appearance: Normal appearance.   Genitourinary:      Urethral meatus normal.      No lesions in the vagina.      Right Labia: No rash, tenderness, lesions or skin changes.     Left Labia: No tenderness, lesions, skin changes or rash.     No inguinal adenopathy present in the right or left side.     No vaginal discharge, tenderness or bleeding.      No vaginal prolapse present.       Right Adnexa: not tender, not full and no mass present.     Left Adnexa: not tender and not full.     No cervical motion tenderness, friability, lesion, polyp or eversion.      Uterus is not enlarged or tender.      No uterine mass detected.  Breasts:     Breasts are symmetrical.      Right: No mass, nipple discharge, skin change or tenderness.      Left: No mass, nipple discharge, skin change or tenderness.   HENT:      Head: Normocephalic and atraumatic.   Neck:      Thyroid: No thyroid mass or thyromegaly.   Pulmonary:      Effort: Pulmonary effort is normal.   Abdominal:      General: Abdomen is flat.      Hernia: There is no hernia in the left inguinal area or right inguinal area.     Musculoskeletal:      Cervical back: Neck supple.      Right lower leg: No edema.      Left lower leg: No edema.   Lymphadenopathy:      Cervical: No cervical adenopathy.      Upper Body:      Right upper body: No " supraclavicular or axillary adenopathy.      Left upper body: No supraclavicular or axillary adenopathy.      Lower Body: No right inguinal adenopathy. No left inguinal adenopathy.     Neurological:      General: No focal deficit present.      Mental Status: She is alert. Mental status is at baseline.     Skin:     General: Skin is warm and dry.     Psychiatric:         Mood and Affect: Mood normal.         Behavior: Behavior normal.         Thought Content: Thought content normal.         Judgment: Judgment normal.   Vitals reviewed.                [1]   Past Medical History:  Diagnosis Date    ACL injury tear     Right knee    Anxiety     Asthma     Depression     Fibroid     Maternal anemia, antepartum 2022    Miscarriage 18    Multigravida of advanced maternal age in third trimester 2022    Pregnancy     resolved: 2015    Uterine fibroid in pregnancy 2022    Varicella    [2]   Past Surgical History:  Procedure Laterality Date    ARTHROSCOPY KNEE      BREAST SURGERY      reduction procedure    [place]  DELIVERY ONLY N/A 2022    Procedure:  SECTION ();  Surgeon: Annie Rodriguez MD;  Location: [facility];  Service: Obstetrics    REDUCTION MAMMAPLASTY Bilateral     TUBAL LIGATION Bilateral 2022    Procedure: LIGATION/COAGULATION TUBAL;  Surgeon: Annie Rodriguez MD;  Location: [facility];  Service: Obstetrics   [3]   Current Outpatient Medications:     albuterol (PROVENTIL HFA,VENTOLIN HFA) 90 mcg/act inhaler, INHALE 2 PUFFS EVERY 6 HOURS AS NEEDED FOR SHORTNESS OF BREATH., Disp: 20.1 g, Rfl: 1    azelastine (ASTELIN) 0.1 % nasal spray, 1 spray into each nostril 2 (two) times a day Use in each nostril as directed, Disp: 30 mL, Rfl: 0    ondansetron (ZOFRAN) 4 mg tablet, Take 1 tablet (4 mg total) by mouth every 8 (eight) hours as needed for nausea or vomiting, Disp: 20 tablet, Rfl: 1    Restasis MultiDose 0.05 % ophthalmic emulsion, Administer 1 drop to  both eyes every 12 (twelve) hours, Disp: , Rfl:     Zepbound 12.5 MG/0.5ML auto-injector, Inject 0.5 mL (12.5 mg total) under the skin once a week Start after finishing 10mg, Disp: 2 mL, Rfl: 0    Zepbound 15 MG/0.5ML auto-injector, Inject 0.5 mL (15 mg total) under the skin once a week Start after completing 12.5mg, Disp: 2 mL, Rfl: 5    methylprednisolone (MEDROL) 4 mg tablet, Medrol dose pack Take as directed., Disp: 21 tablet, Rfl: 0    triamcinolone (KENALOG) 0.1 % cream, Apply topically 2 (two) times a day for 7 days, Disp: 45 g, Rfl: 0  [4]   Allergies  Allergen Reactions    Amoxicillin Hives    Other      Annotation - [id number]: paprika  blackberries    Penicillins Hives    Sulfa Antibiotics Hives

## 2025-07-18 NOTE — PATIENT INSTRUCTIONS
FOODS THAT MAY BE BLADDER IRRITANTS    The following selections show the most common foods which may worsen or irritate the bladder in patients with overactive bladder or interstitial cystitis. This does not mean that you need to avoid all of these foods, but you should pay close attention to how you feel after eating them. If you feel worse, then there is a good chance that this is a trigger food for you.    Cranberry Juices and Extracts:   Cranberry juice may be the most frequent irritant in a patient’s diet.   Recommended for consumption during urinary tract infections, cranberry juice can be very difficult for an overactive bladder to tolerate.  If you don’t wish to give it up entirely, try diluting it with water by half or more.    Coffee and Tea Products:   In a sensitive bladder, coffee is believed to be the top bladder irritant.   Some people can tolerate low acid coffees, while others try teas.   For the most sensitive patient, the best option for a hot drink is hot water with honey.    Carbonated beverages an sodas of any type (diet and regular):   The most difficult soda to tolerate appears to be diet cola, which may consist of carbonation, caffeine, aspartame, and cocoa derivatives, four known bladder irritants.   If you must have soda, try a clear, non-diet soda like Sprite.    Tomatoes, Tofu, and some Beans:   Red tomatoes can be very acidic.  Some patients can tolerate pizza and tomato sauces for pasta, while others cannot.   Low acid yellow tomatoes may be good substitutes in pasta and salads.  Renny, black, lima and soy beans are also potential irritants, due to their high acid content.    Herbal teas:   Patients with overactive bladder can be sensitive to herb teas, particularly those that have many ingredients.   If you cannot avoid herbal tea, try experimenting with one or two ingredients at a time, so you can tell if a particular herb bothers you.    Tobacco.    Alcohol and Vinegars:   Wine,  beer, champagne and even wine sauces could be irritants due to their alcohol content.  Some patients find white sandra more tolerable than red sandra (due to their reduced histamine content).    Chocolate  Chocolate is considered one of the worst irritants for the body.    Strawberries and Acidic Fruits  Lemon, orange, grapefruit, pineapple, strawberries and plums have been known to inflame sensitive bladders.   Fruit juices seem to be particularly difficult for some patients to tolerate because each can of juice may contain more acid than contained in one piece of fruit.  Try low acid fruit juice, or diluted juice.    Food Additives and Seasonings  Food additives are known to cause chemical sensitivities and allergic reactions in a small percentage of the general public, particularly monosodium glutamate (MSG), nitrates, and butylated hydroxytolene (BHT).  Monosodium Glutamate (MSG) is frequently found in prepared foods and mixes.  Nitrates, found in prepared meats, have long been suspected as bladder irritants.  Butylated hydroxytolene (BHT), is commonly found in cereals and breads.  A careful review of ingredient labels will help identify foods that should be limited or eliminated from your diet.

## 2025-08-06 ENCOUNTER — CLINICAL SUPPORT (OUTPATIENT)
Age: 45
End: 2025-08-06

## 2025-08-06 VITALS — HEIGHT: 63 IN | BODY MASS INDEX: 29.16 KG/M2 | WEIGHT: 164.6 LBS

## 2025-08-06 DIAGNOSIS — R63.5 ABNORMAL WEIGHT GAIN: Primary | ICD-10-CM

## 2025-08-06 PROCEDURE — WMDI30

## 2025-08-06 PROCEDURE — RECHECK

## 2025-08-07 ENCOUNTER — TELEPHONE (OUTPATIENT)
Age: 45
End: 2025-08-07

## 2025-08-07 DIAGNOSIS — E66.9 OBESITY (BMI 30-39.9): ICD-10-CM

## 2025-08-07 RX ORDER — TIRZEPATIDE 12.5 MG/.5ML
12.5 INJECTION, SOLUTION SUBCUTANEOUS WEEKLY
Qty: 2 ML | Refills: 1 | Status: SHIPPED | OUTPATIENT
Start: 2025-08-07 | End: 2025-08-12 | Stop reason: SDUPTHER

## 2025-08-08 ENCOUNTER — TELEPHONE (OUTPATIENT)
Dept: INTERNAL MEDICINE CLINIC | Facility: CLINIC | Age: 45
End: 2025-08-08

## 2025-08-12 ENCOUNTER — OFFICE VISIT (OUTPATIENT)
Dept: INTERNAL MEDICINE CLINIC | Facility: CLINIC | Age: 45
End: 2025-08-12
Payer: COMMERCIAL

## 2025-08-19 ENCOUNTER — TELEPHONE (OUTPATIENT)
Dept: INTERNAL MEDICINE CLINIC | Facility: CLINIC | Age: 45
End: 2025-08-19

## (undated) DEVICE — SKIN MARKER DUAL TIP WITH RULER CAP, FLEXIBLE RULER AND LABELS: Brand: DEVON

## (undated) DEVICE — ABDOMINAL PAD: Brand: DERMACEA

## (undated) DEVICE — GAUZE SPONGES,16 PLY: Brand: CURITY

## (undated) DEVICE — PACK C-SECTION PBDS

## (undated) DEVICE — TELFA NON-ADHERENT ABSORBENT DRESSING: Brand: TELFA

## (undated) DEVICE — SUT PLAIN 3-0 CT-1 27 IN 842H

## (undated) DEVICE — SUT VICRYL 2-0 CT-1 27 IN J259H

## (undated) DEVICE — SUT MONOCRYL 4-0 PS-2 27 IN Y426H

## (undated) DEVICE — CHLORAPREP HI-LITE 10.5ML ORANGE

## (undated) DEVICE — GLOVE PI ULTRA TOUCH SZ.7.0

## (undated) DEVICE — Device

## (undated) DEVICE — SUT MONOCRYL 0 CTX 36 IN Y398H

## (undated) DEVICE — SPONGE LAP 18 X 18 IN STRL RFD

## (undated) DEVICE — SUT VICRYL 0 CT-1 36 IN J946H

## (undated) DEVICE — SUT VICRYL 0 CTX 36 IN J978H

## (undated) DEVICE — SUT VICRYL RAPIDE 2-0 CT-1 36 IN VR945

## (undated) DEVICE — SUT VICRYL 0 CT-1 27 IN J260H